# Patient Record
Sex: FEMALE | Race: WHITE | NOT HISPANIC OR LATINO | Employment: FULL TIME | URBAN - METROPOLITAN AREA
[De-identification: names, ages, dates, MRNs, and addresses within clinical notes are randomized per-mention and may not be internally consistent; named-entity substitution may affect disease eponyms.]

---

## 2017-01-26 ENCOUNTER — OFFICE VISIT (OUTPATIENT)
Dept: DERMATOLOGY | Facility: CLINIC | Age: 57
End: 2017-01-26

## 2017-01-26 VITALS — DIASTOLIC BLOOD PRESSURE: 89 MMHG | SYSTOLIC BLOOD PRESSURE: 137 MMHG | HEART RATE: 62 BPM

## 2017-01-26 DIAGNOSIS — L63.9 ALOPECIA AREATA: Primary | ICD-10-CM

## 2017-01-26 DIAGNOSIS — L63.9 ALOPECIA AREATA: ICD-10-CM

## 2017-01-26 DIAGNOSIS — L73.9 FOLLICULITIS: ICD-10-CM

## 2017-01-26 LAB
BASOPHILS # BLD AUTO: 0.1 10E9/L (ref 0–0.2)
BASOPHILS NFR BLD AUTO: 0.9 %
DIFFERENTIAL METHOD BLD: NORMAL
EOSINOPHIL # BLD AUTO: 0.3 10E9/L (ref 0–0.7)
EOSINOPHIL NFR BLD AUTO: 3.3 %
ERYTHROCYTE [DISTWIDTH] IN BLOOD BY AUTOMATED COUNT: 12.4 % (ref 10–15)
HBA1C MFR BLD: 5.5 % (ref 4.3–6)
HCT VFR BLD AUTO: 43.5 % (ref 35–47)
HGB BLD-MCNC: 14.6 G/DL (ref 11.7–15.7)
IMM GRANULOCYTES # BLD: 0 10E9/L (ref 0–0.4)
IMM GRANULOCYTES NFR BLD: 0.4 %
LYMPHOCYTES # BLD AUTO: 3.3 10E9/L (ref 0.8–5.3)
LYMPHOCYTES NFR BLD AUTO: 35.2 %
MCH RBC QN AUTO: 31 PG (ref 26.5–33)
MCHC RBC AUTO-ENTMCNC: 33.6 G/DL (ref 31.5–36.5)
MCV RBC AUTO: 92 FL (ref 78–100)
MONOCYTES # BLD AUTO: 0.4 10E9/L (ref 0–1.3)
MONOCYTES NFR BLD AUTO: 4.7 %
NEUTROPHILS # BLD AUTO: 5.2 10E9/L (ref 1.6–8.3)
NEUTROPHILS NFR BLD AUTO: 55.5 %
NRBC # BLD AUTO: 0 10*3/UL
NRBC BLD AUTO-RTO: 0 /100
PLATELET # BLD AUTO: 316 10E9/L (ref 150–450)
RBC # BLD AUTO: 4.71 10E12/L (ref 3.8–5.2)
WBC # BLD AUTO: 9.3 10E9/L (ref 4–11)

## 2017-01-26 RX ORDER — GENTAMICIN SULFATE 1 MG/G
CREAM TOPICAL PRN
COMMUNITY
Start: 2016-11-21 | End: 2023-01-05

## 2017-01-26 RX ORDER — ALBUTEROL SULFATE 90 UG/1
AEROSOL, METERED RESPIRATORY (INHALATION)
COMMUNITY
Start: 2006-01-01

## 2017-01-26 RX ORDER — SIMVASTATIN 80 MG
80 TABLET ORAL DAILY
COMMUNITY
Start: 2006-01-01

## 2017-01-26 RX ORDER — TRIAMTERENE CAPSULES 50 MG/1
CAPSULE ORAL DAILY
COMMUNITY
End: 2020-02-20

## 2017-01-26 RX ORDER — ALBUTEROL SULFATE 0.83 MG/ML
SOLUTION RESPIRATORY (INHALATION)
COMMUNITY
Start: 2010-02-01 | End: 2020-02-20

## 2017-01-26 RX ORDER — HYDROCODONE BITARTRATE AND ACETAMINOPHEN 5; 325 MG/1; MG/1
TABLET ORAL
COMMUNITY
Start: 2010-01-01

## 2017-01-26 RX ORDER — MONTELUKAST SODIUM 10 MG/1
10 TABLET ORAL DAILY
COMMUNITY
Start: 2010-01-01

## 2017-01-26 RX ORDER — KETOCONAZOLE 20 MG/ML
SHAMPOO TOPICAL
COMMUNITY
Start: 2016-11-21 | End: 2017-06-29

## 2017-01-26 ASSESSMENT — PAIN SCALES - GENERAL: PAINLEVEL: NO PAIN (0)

## 2017-01-26 NOTE — Clinical Note
1/26/2017       RE: Eliane Lockhart  [address]  [facility] [address]     Dear Colleague,    Thank you for referring your patient, Eliane Lockhart, to the [facility] DERMATOLOGY at [facility]. Please see a copy of my visit note below.    No notes on file    Again, thank you for allowing me to participate in the care of your patient.      Sincerely,    León Alejo MD

## 2017-01-26 NOTE — MR AVS SNAPSHOT
After Visit Summary   1/26/2017    Eliane Lockhart    MRN: [id number]           Patient Information     Date Of Birth          1960        Visit Information        Provider Department      1/26/2017 8:30 AM León Alejo MD [facility] Dermatology        Today's Diagnoses     Alopecia areata    -  1        Follow-ups after your visit        Your next 10 appointments already scheduled     Jan 26, 2017  9:45 AM   LAB with  LAB   [facility] ([facility])    [address] [place] [id number] [phone]           Patient must bring picture ID.  Patient should be prepared to give a urine specimen  Please do not eat 10-12 hours before your appointment if you are coming in fasting for labs on lipids, cholesterol, or glucose (sugar).  Pregnant women should follow their Care Team instructions. Water with medications is okay. Do not drink coffee or other fluids.   If you have concerns about taking  your medications, please ask at office or if scheduling via LOC&ALLhart, send a message by clicking on Secure Messaging, Message Your Care Team.            Mar 02, 2017  9:15 AM   (Arrive by 9:00 AM)   Return Visit with León Alejo MD   [facility] Dermatology ([facility])    [address] [place] [address] [phone]              Future tests that were ordered for you today     Open Future Orders        Priority Expected Expires Ordered    Skin Culture Aerobic Bacterial Routine  1/26/2018 1/26/2017    Antinuclear antibody screen by EIA Routine  1/26/2018 1/26/2017    Hemoglobin A1c Routine  1/27/2018 1/26/2017    CBC with platelets differential Routine  1/26/2018 1/26/2017    DHEA sulfate Routine  1/26/2018 1/26/2017    Testosterone Free and Total Routine  1/26/2018 1/26/2017    Thyroid peroxidase antibody Routine  1/26/2018 1/26/2017            Who to contact     Please call your clinic at [phone]  to:    Ask questions about your health    Make or cancel appointments    Discuss your medicines    Learn about your test results    Speak to your doctor   If you have compliments or concerns about an experience at your clinic, or if you wish to file a complaint, please contact [facility] Patient Relations at [phone] or email us at [email or web address]         Additional Information About Your Visit        Flashstartshart Information     Flashstartshart gives you secure access to your electronic health record. If you see a primary care provider, you can also send messages to your care team and make appointments. If you have questions, please call your primary care clinic.  If you do not have a primary care provider, please call [phone] and they will assist you.      Venuefox is an electronic gateway that provides easy, online access to your medical records. With Venuefox, you can request a clinic appointment, read your test results, renew a prescription or communicate with your care team.     To access your existing account, please contact your [facility] or call [phone] for assistance.        Care EveryWhere ID     This is your Care EveryWhere ID. This could be used by other organizations to access your [place] medical records  [id number]         Blood Pressure from Last 3 Encounters:   No data found for BP    Weight from Last 3 Encounters:   No data found for Wt               Primary Care Provider Office Phone # Fax #    Madai Elizondo [phone]       [facility] [address]  [facility] [address]        Thank you!     Thank you for choosing [facility]  for your care. Our goal is always to provide you with excellent care. Hearing back from our patients is one way we can continue to improve our services. Please take a few minutes to complete the written survey that you may receive in the mail  after your visit with us. Thank you!             Your Updated Medication List - Protect others around you: Learn how to safely use, store and throw away your medicines at www.disposemymeds.org.          This list is accurate as of: 1/26/17  9:32 AM.  Always use your most recent med list.                   Brand Name Dispense Instructions for use    ADVAIR DISKUS 250-50 MCG/DOSE diskus inhaler   Generic drug:  fluticasone-salmeterol          * VENTOLIN  (90 BASE) MCG/ACT Inhaler   Generic drug:  albuterol          * albuterol (2.5 MG/3ML) 0.083% neb solution          gentamicin 0.1 % cream    GARAMYCIN         HYDROcodone-acetaminophen 5-325 MG per tablet    NORCO         ketoconazole 2 % shampoo    NIZORAL         montelukast 10 MG tablet    SINGULAIR         simvastatin 80 MG tablet    ZOCOR         triamterene 50 MG capsule    DYRENIUM         * Notice:  This list has 2 medication(s) that are the same as other medications prescribed for you. Read the directions carefully, and ask your doctor or other care provider to review them with you.

## 2017-01-26 NOTE — NURSING NOTE
Dermatology Rooming Note    Eliane Lockhart's goals for this visit include:   Chief Complaint   Patient presents with     Derm Problem     Patient comes to clinic today referred by [place] [facility] for AA since October.     Aaliyah Archibald CMA

## 2017-01-26 NOTE — PROGRESS NOTES
"[facility] Dermatology Note      Dermatology Problem List:  1. Alopecia areata, rapidly-progressive, with scalp folliculitis    Encounter Date: Jan 26, 2017    CC:   Chief Complaint   Patient presents with     Derm Problem     Patient comes to clinic today referred by [place] [facility] for AA since October.         History of Present Illness:  Ms. Eliane Lockhart is a 57 year old female who presents for evaluation of severe hair loss since October, that began after she started treatment for an occipital rash of uncertain duration with topical triamcinolone 0.1% ointment. She reports rapidly-progressive hair loss in clumps, involving her entire scalp and eyebrows within days thereafter, as well as decreased hair growth on her arm and legs. She has been treated with oral cephalexin for a culture-proven staphylococcal scalp folliculitis, noting improvement of her previous large, tender \"pus bumps\" with smaller, less tender ones remaining today.     Past Medical History:   Significant for Rheumatoid arthritis(treated with methotrexate in the distant past, now with OTC NSAIDs), obesity, hyperlipidemia, and asthma requiring multiple inhalers and intermittent prednisone    Family History:  Daughter with atopic dermatitis, otherwise no chronic skin/autoimmune conditions.    Medications:  Current Outpatient Prescriptions   Medication Sig Dispense Refill     fluticasone-salmeterol (ADVAIR DISKUS) 250-50 MCG/DOSE diskus inhaler        albuterol (2.5 MG/3ML) 0.083% neb solution        HYDROcodone-acetaminophen (NORCO) 5-325 MG per tablet        montelukast (SINGULAIR) 10 MG tablet        simvastatin (ZOCOR) 80 MG tablet        albuterol (VENTOLIN HFA) 108 (90 BASE) MCG/ACT Inhaler        gentamicin (GARAMYCIN) 0.1 % cream        ketoconazole (NIZORAL) 2 % shampoo        triamterene (DYRENIUM) 50 MG capsule           Allergies   Allergen Reactions     Seasonal Allergies Other (See Comments)     Runny nose, " "eyes, difficulty breathing       Review of Systems:  General: No recent infections, fevers, chills, malaise, arthralgias, unexplained weight/appetite changes  Skin: No new/changing moles, nothing bleeding/nonhealing/painful/tender/rapidly-growing.  Lymphatic: No subcutaneous lumps/bumps.    Physical exam:  Vitals: /89, P: 62  GEN: Well-appearing female, comfortable, cooperative  SKIN: Exam of the scalp, face including lids and lips, arms, fingernails:  - diffuse severe thinning of scalp and eyebrow hair density with intact follicular ostia, multiple exclamation point hairs, and positive pull testing from the R frontal region.  - >20 follicular red papules with two intact 1-mm pustules on the occipital and parietal scalp  - nails WNL  -No other lesions of concern on areas examined.       Labs:    Outside biopsy: 11/15/16, L parietal scalp:  \"alopecia areata with acute folliculitis and gram-positive cocci\"  - brisk Peribulbar and perifollicular inflammation, prominent shift to telogen phase.    Outside labs   8/9/16 TSH 2.27, Vit D 25 33.2 (ref: 30.0 - 80.0) Na 138, K 4.1, Glucose 94, Cr 0.84, BUN 16,   10/18/16 - Cholesterol 219, , HDL 66, , Progesterone 0.1, ALT 24     Impression/Plan:  1. Alopecia areata, rapidly-progressive with secondary folliculitis.  - discussed suspected etiology, and likely multifactorial nature  - swab for culture  - cont ketocoanzole shampoo, oral Vit D supplementation  - RX: BPO 5% washes  - draw cbc, free/total testosterone, anti-TPO, A1C  - will consider  antibiotics and prednisone pending lab results    Follow-up: 5 weeks.    Discussed and examined with [facility] staff dermatologist Dr. Jayda Siddiqi.    León Alejo MD, CHRISTIAN  PGY-4, Dermatology    Staff Involved:  Resident(León Alejo)/Staff(as above)      "

## 2017-01-26 NOTE — Clinical Note
"1/26/2017      RE: Eliane Lockhart  [address]  [facility] [address]       [facility] Dermatology Note      Dermatology Problem List:  1. Alopecia areata, rapidly-progressive, with scalp folliculitis    Encounter Date: Jan 26, 2017    CC:   Chief Complaint   Patient presents with     Derm Problem     Patient comes to clinic today referred by [place] [facility] for AA since October.         History of Present Illness:  Ms. Eliane Lockhart is a 57 year old female who presents for evaluation of severe hair loss since October, that began after she started treatment for an occipital rash of uncertain duration with topical triamcinolone 0.1% ointment. She reports rapidly-progressive hair loss in clumps, involving her entire scalp and eyebrows within days thereafter, as well as decreased hair growth on her arm and legs. She has been treated with oral cephalexin for a culture-proven staphylococcal scalp folliculitis, noting improvement of her previous large, tender \"pus bumps\" with smaller, less tender ones remaining today.     Past Medical History:   Significant for Rheumatoid arthritis(treated with methotrexate in the distant past, now with OTC NSAIDs), obesity, hyperlipidemia, and asthma requiring multiple inhalers and intermittent prednisone    Family History:  Daughter with atopic dermatitis, otherwise no chronic skin/autoimmune conditions.    Medications:  Current Outpatient Prescriptions   Medication Sig Dispense Refill     fluticasone-salmeterol (ADVAIR DISKUS) 250-50 MCG/DOSE diskus inhaler        albuterol (2.5 MG/3ML) 0.083% neb solution        HYDROcodone-acetaminophen (NORCO) 5-325 MG per tablet        montelukast (SINGULAIR) 10 MG tablet        simvastatin (ZOCOR) 80 MG tablet        albuterol (VENTOLIN HFA) 108 (90 BASE) MCG/ACT Inhaler        gentamicin (GARAMYCIN) 0.1 % cream        ketoconazole (NIZORAL) 2 % shampoo        triamterene (DYRENIUM) 50 MG capsule    " "       Allergies   Allergen Reactions     Seasonal Allergies Other (See Comments)     Runny nose, eyes, difficulty breathing       Review of Systems:  General: No recent infections, fevers, chills, malaise, arthralgias, unexplained weight/appetite changes  Skin: No new/changing moles, nothing bleeding/nonhealing/painful/tender/rapidly-growing.  Lymphatic: No subcutaneous lumps/bumps.    Physical exam:  Vitals: /89, P: 62  GEN: Well-appearing female, comfortable, cooperative  SKIN: Exam of the scalp, face including lids and lips, arms, fingernails:  - diffuse severe thinning of scalp and eyebrow hair density with intact follicular ostia, multiple exclamation point hairs, and positive pull testing from the R frontal region.  - >20 follicular red papules with two intact 1-mm pustules on the occipital and parietal scalp  - nails WNL  -No other lesions of concern on areas examined.       Labs:    Outside biopsy: 11/15/16, L parietal scalp:  \"alopecia areata with acute folliculitis and gram-positive cocci\"  - brisk Peribulbar and perifollicular inflammation, prominent shift to telogen phase.    Outside labs   8/9/16 TSH 2.27, Vit D 25 33.2 (ref: 30.0 - 80.0) Na 138, K 4.1, Glucose 94, Cr 0.84, BUN 16,   10/18/16 - Cholesterol 219, , HDL 66, , Progesterone 0.1, ALT 24     Impression/Plan:  1. Alopecia areata, rapidly-progressive with secondary folliculitis.  - discussed suspected etiology, and likely multifactorial nature  - swab for culture  - cont ketocoanzole shampoo, oral Vit D supplementation  - RX: BPO 5% washes  - draw cbc, free/total testosterone, anti-TPO, A1C  - will consider  antibiotics and prednisone pending lab results    Follow-up: 5 weeks.    Discussed and examined with [facility] staff dermatologist Dr. Jayda Siddiqi.    León Alejo MD, CHRISTIAN  PGY-4, Dermatology    Staff Involved:  Resident(León Alejo)/Staff(as above)                      Pictures taken of patient today to be placed " in chart for future reference.        León Alejo MD

## 2017-01-27 LAB
ANA SER QL IA: 1.3
DHEA-S SERPL-MCNC: 58 UG/DL (ref 35–430)
THYROPEROXIDASE AB SERPL-ACNC: 18 IU/ML

## 2017-01-28 LAB
BACTERIA SPEC CULT: NORMAL
Lab: NORMAL
MICRO REPORT STATUS: NORMAL
SPECIMEN SOURCE: NORMAL

## 2017-01-29 LAB
SHBG SERPL-SCNC: 32 NMOL/L (ref 30–135)
TESTOST FREE SERPL-MCNC: 0.18 NG/DL (ref 0.06–0.38)
TESTOST SERPL-MCNC: 10 NG/DL (ref 8–60)

## 2017-02-13 ENCOUNTER — MYC MEDICAL ADVICE (OUTPATIENT)
Dept: DERMATOLOGY | Facility: CLINIC | Age: 57
End: 2017-02-13

## 2017-02-22 RX ORDER — PREDNISONE 5 MG/1
TABLET ORAL
Qty: 124 TABLET | Refills: 0 | Status: SHIPPED
Start: 2017-02-22 | End: 2023-01-05

## 2017-03-30 ENCOUNTER — OFFICE VISIT (OUTPATIENT)
Dept: DERMATOLOGY | Facility: CLINIC | Age: 57
End: 2017-03-30

## 2017-03-30 ENCOUNTER — TELEPHONE (OUTPATIENT)
Dept: DERMATOLOGY | Facility: CLINIC | Age: 57
End: 2017-03-30

## 2017-03-30 DIAGNOSIS — L63.9 AA (ALOPECIA AREATA): Primary | ICD-10-CM

## 2017-03-30 ASSESSMENT — PAIN SCALES - GENERAL: PAINLEVEL: NO PAIN (0)

## 2017-03-30 NOTE — MR AVS SNAPSHOT
After Visit Summary   3/30/2017    Eliane Lockhart    MRN: [id number]           Patient Information     Date Of Birth          1960        Visit Information        Provider Department      3/30/2017 8:00 AM León Alejo MD [facility] Dermatology        Today's Diagnoses     AA (alopecia areata)    -  1       Follow-ups after your visit        Your next 10 appointments already scheduled     Jun 29, 2017  8:00 AM CDT   (Arrive by 7:45 AM)   Return Visit with MD S[facility] Dermatology ([facility])    [address] [place] [address] [phone]              Who to contact     Please call your clinic at [phone] to:    Ask questions about your health    Make or cancel appointments    Discuss your medicines    Learn about your test results    Speak to your doctor   If you have compliments or concerns about an experience at your clinic, or if you wish to file a complaint, please contact [facility] Patient Relations at [phone] or email us at [email or web address]         Additional Information About Your Visit        MyChart Information     Playrollt gives you secure access to your electronic health record. If you see a primary care provider, you can also send messages to your care team and make appointments. If you have questions, please call your primary care clinic.  If you do not have a primary care provider, please call [phone] and they will assist you.      Playrollt is an electronic gateway that provides easy, online access to your medical records. With [employer], you can request a clinic appointment, read your test results, renew a prescription or communicate with your care team.     To access your existing account, please contact your [facility] or call [phone] for assistance.        Care EveryWhere ID     This is your Care EveryWhere ID. This  could be used by other organizations to access your [place] medical records  [id number]         Blood Pressure from Last 3 Encounters:   01/26/17 137/89    Weight from Last 3 Encounters:   No data found for Wt              Today, you had the following     No orders found for display         Today's Medication Changes          These changes are accurate as of: 3/30/17  8:36 AM.  If you have any questions, ask your nurse or doctor.               Start taking these medicines.        Dose/Directions    tofacitinib 5 MG tablet   Commonly known as:  XELJANZ   Used for:  AA (alopecia areata)   Started by:  León Alejo MD        Dose:  5 mg   Take 1 tablet (5 mg) by mouth 2 times daily   Quantity:  60 tablet   Refills:  2            Where to get your medicines      These medications were sent to [facility] - [place], [place] - [address]  [address] [facility] [address]    Hours:  Mon-Fri 8:30am-5:00pm Toll Free [phone] Phone:  [phone]     tofacitinib 5 MG tablet                Primary Care Provider Office Phone # Fax #    Madai Elizondo [phone]       [facility] [address]  [facility] [address]        Thank you!     Thank you for choosing [facility] DERMATOLOGY  for your care. Our goal is always to provide you with excellent care. Hearing back from our patients is one way we can continue to improve our services. Please take a few minutes to complete the written survey that you may receive in the mail after your visit with us. Thank you!             Your Updated Medication List - Protect others around you: Learn how to safely use, store and throw away your medicines at www.disposemymeds.org.          This list is accurate as of: 3/30/17  8:36 AM.  Always use your most recent med list.                   Brand Name Dispense Instructions for use    ADVAIR DISKUS 250-50 MCG/DOSE diskus inhaler   Generic drug:   fluticasone-salmeterol          * VENTOLIN  (90 BASE) MCG/ACT Inhaler   Generic drug:  albuterol          * albuterol (2.5 MG/3ML) 0.083% neb solution          gentamicin 0.1 % cream    GARAMYCIN         HYDROcodone-acetaminophen 5-325 MG per tablet    NORCO         ketoconazole 2 % shampoo    NIZORAL         montelukast 10 MG tablet    SINGULAIR         predniSONE 5 MG tablet    DELTASONE    124 tablet    Take 8 tablets for 5 days, 7 tablets for 3 days, 6 tablets for 3 days, 5 tablets for 3 days, 4 tablets for 3 days, 3 tablets for 3 days, 2 tablets for 3 days, then 1 tablet for 3 days.       simvastatin 80 MG tablet    ZOCOR         tofacitinib 5 MG tablet    XELJANZ    60 tablet    Take 1 tablet (5 mg) by mouth 2 times daily       triamterene 50 MG capsule    DYRENIUM         * Notice:  This list has 2 medication(s) that are the same as other medications prescribed for you. Read the directions carefully, and ask your doctor or other care provider to review them with you.

## 2017-03-30 NOTE — NURSING NOTE
Dermatology Rooming Note    Eliane Lockhart's goals for this visit include:   Chief Complaint   Patient presents with     Derm Problem     Patient comes to clinic today for hairloss. States improvement.     Aaliyah Archibald, CMA

## 2017-03-30 NOTE — LETTER
3/30/2017       RE: Eliane Lockhart  [address]  [facility] [address]     Dear Colleague,    Thank you for referring your patient, Eliane Lockhart, to the [facility] DERMATOLOG[facility] at [facility]. Please see a copy of my visit note below.                    Pictures taken of patient today to be placed in chart for future reference.      [facility] Dermatology Note      Dermatology Problem List:  1. Alopecia areata, rapidly-progressive, Onset Oct 2010 with scalp folliculitis  - ILK  2. Rheumatoid arthritis, no current tx (methotrexate previously)    Encounter Date: Mar 30, 2017    CC:   Chief Complaint   Patient presents with     Derm Problem     Patient comes to clinic today for hairloss. States improvement.         History of Present Illness:  Ms. Eliane Lockhart is a 57 year old female who returns for follow-up. She reports modest but appreciable regrowth on her scalp but not on her legs, arms, or eyebrows with prednisone taper Rx'd in January, no significant side effects, now off of the medication.       Past Medical History:   Significant for Rheumatoid arthritis(treated with methotrexate in the distant past, now with OTC NSAIDs), obesity, hyperlipidemia, and asthma requiring multiple inhalers and intermittent prednisone    Family History:  Daughter with atopic dermatitis, otherwise no chronic skin/autoimmune conditions.    Medications:  Current Outpatient Prescriptions   Medication Sig Dispense Refill     predniSONE (DELTASONE) 5 MG tablet Take 8 tablets for 5 days, 7 tablets for 3 days, 6 tablets for 3 days, 5 tablets for 3 days, 4 tablets for 3 days, 3 tablets for 3 days, 2 tablets for 3 days, then 1 tablet for 3 days. 124 tablet 0     fluticasone-salmeterol (ADVAIR DISKUS) 250-50 MCG/DOSE diskus inhaler        albuterol (2.5 MG/3ML) 0.083% neb solution        HYDROcodone-acetaminophen (NORCO) 5-325 MG per tablet        montelukast  "(SINGULAIR) 10 MG tablet        simvastatin (ZOCOR) 80 MG tablet        triamterene (DYRENIUM) 50 MG capsule        albuterol (VENTOLIN HFA) 108 (90 BASE) MCG/ACT Inhaler        gentamicin (GARAMYCIN) 0.1 % cream        ketoconazole (NIZORAL) 2 % shampoo           Allergies   Allergen Reactions     Seasonal Allergies Other (See Comments)     Runny nose, eyes, difficulty breathing       Review of Systems:  General: No recent infections, fevers, chills, malaise, arthralgias, unexplained weight/appetite changes  Skin: No new/changing moles, nothing bleeding/nonhealing/painful/tender/rapidly-growing.  Lymphatic: No subcutaneous lumps/bumps.    Physical exam:  Vitals: /89, P: 62  GEN: Well-appearing female, comfortable, cooperative  SKIN: Exam of the scalp, face including lids and lips, arms, fingernails:  - diffuse severe thinning of scalp and eyebrow hair density with intact follicular ostia, multiple exclamation point hairs, and positive pull testing from the R frontal region.  - no active folliculitis  - nails WNL  -No other lesions of concern on areas examined.       Labs:    Outside biopsy: 11/15/16, L parietal scalp:  \"alopecia areata with acute folliculitis and gram-positive cocci\"  - brisk Peribulbar and perifollicular inflammation, prominent shift to telogen phase.    Outside labs   8/9/16 TSH 2.27, Vit D 25 33.2 (ref: 30.0 - 80.0) Na 138, K 4.1, Glucose 94, Cr 0.84, BUN 16,   10/18/16 - Cholesterol 219, , HDL 66, , Progesterone 0.1, ALT 24     Impression/Plan:  1. Alopecia areata, rapidly-progressive with secondary folliculitis. Hx RA not currently being treated, has had mtx in the past. Discussed favored options, mtx vs tofacitinib (which is approved for RA). She prefers the latter  - discussed tofacitinib side effects at length.    Pending PAP approval, will start tofacinitib ***5mg/10mg ***once daily/twice daily. For tofacitinib monitoring, will obtain baseline CBC with differential, CMP, " fasting lipid profile, quantiferon TB gold, HIV/Hep B core IgM/Hep B core IgG/Hep B sAG/Hep C Ab,  and HR/BP.     We will not initiate therapy if abs lymphocyte count <500 cells/mm3, absolute neutrophil count <1000 cells/mm3, hemoglobin <9 g/dL, baseline heart rate <60 bpm.For lab monitoring, will repeat CBC with diff, CMP, fasting lipid profile in 4 weeks, 8 weeks, and if stable then every 3 months thereafter.     Skin exam for skin cancer will also be periodically performed.  Will need to review with patient at each visit symptoms of nasopharyngitis, myalgias, cardiovascular effects,  abdominal symptoms including diarrhea, nausea, headache, parasthesias, recent hospitalizations/illnesses, new or changing moles.     HR/BP will be obtained at each clinic visit.     This is the web site to complete the patient assistance application    [email or web address]  Follow-up: 5 weeks.    Discussed and examined with [facility] staff dermatologist Dr. Jayda Siddiqi.    León Alejo MD, CHRISTIAN  PGY-4, Dermatology    Staff Involved:  Resident(León Alejo)/Staff(as above)        Discussed with patient tofacitinib as an option. Discussed benefits and risks of medication including but not limited to risk of serious infection, lymphoma, other malignancies, abnormalities in CBC, liver dysfunction, URI and renal disease. The patient was counseled to hold dose if he/she feels ill and to contact clinic. Vaccinations reviewed. The patient denies conduction abnormalities, syncope or arrhythmia, ischemic heart disease, heart failure, and is not receiving concomitant therapy known to decrease heart rate or prolong the MO interval. There is no history of GI perforation, diverticulitis or interstitial lung disease. Patient is not currently pregnant or breastfeeding.

## 2017-03-30 NOTE — PROGRESS NOTES
[facility] Dermatology Note      Dermatology Problem List:  1. Alopecia areata, rapidly-progressive, Onset Oct 2010 with scalp folliculitis  - ILK  2. Rheumatoid arthritis, no current tx (methotrexate previously)    Encounter Date: Mar 30, 2017    CC:   Chief Complaint   Patient presents with     Derm Problem     Patient comes to clinic today for hairloss. States improvement.         History of Present Illness:  Ms. Eliane Lockhart is a 57 year old female who returns for follow-up. She reports modest but appreciable regrowth on her scalp but not on her legs, arms, or eyebrows with prednisone taper Rx'd in January, no significant side effects, now off of the medication.       Past Medical History:   Significant for Rheumatoid arthritis(treated with methotrexate in the distant past, now with OTC NSAIDs), obesity, hyperlipidemia, and asthma requiring multiple inhalers and intermittent prednisone    Family History:  Daughter with atopic dermatitis, otherwise no chronic skin/autoimmune conditions.    Medications:  Current Outpatient Prescriptions   Medication Sig Dispense Refill     predniSONE (DELTASONE) 5 MG tablet Take 8 tablets for 5 days, 7 tablets for 3 days, 6 tablets for 3 days, 5 tablets for 3 days, 4 tablets for 3 days, 3 tablets for 3 days, 2 tablets for 3 days, then 1 tablet for 3 days. 124 tablet 0     fluticasone-salmeterol (ADVAIR DISKUS) 250-50 MCG/DOSE diskus inhaler        albuterol (2.5 MG/3ML) 0.083% neb solution        HYDROcodone-acetaminophen (NORCO) 5-325 MG per tablet        montelukast (SINGULAIR) 10 MG tablet        simvastatin (ZOCOR) 80 MG tablet        triamterene (DYRENIUM) 50 MG capsule        albuterol (VENTOLIN HFA) 108 (90 BASE) MCG/ACT Inhaler        gentamicin (GARAMYCIN) 0.1 % cream        ketoconazole (NIZORAL) 2 % shampoo           Allergies   Allergen Reactions     Seasonal Allergies Other (See Comments)     Runny nose, eyes, difficulty breathing       Review  "of Systems:  General: No recent infections, fevers, chills, malaise, arthralgias, unexplained weight/appetite changes  Skin: No new/changing moles, nothing bleeding/nonhealing/painful/tender/rapidly-growing.  Lymphatic: No subcutaneous lumps/bumps.    Physical exam:  Vitals: /89, P: 62  GEN: Well-appearing female, comfortable, cooperative  SKIN: Exam of the scalp, face including lids and lips, arms, fingernails:  - diffuse severe thinning of scalp and eyebrow hair density with intact follicular ostia, multiple exclamation point hairs, and positive pull testing from the R frontal region.  - no active folliculitis  - nails WNL  -No other lesions of concern on areas examined.       Labs:    Outside biopsy: 11/15/16, L parietal scalp:  \"alopecia areata with acute folliculitis and gram-positive cocci\"  - brisk Peribulbar and perifollicular inflammation, prominent shift to telogen phase.    Outside labs   8/9/16 TSH 2.27, Vit D 25 33.2 (ref: 30.0 - 80.0) Na 138, K 4.1, Glucose 94, Cr 0.84, BUN 16,   10/18/16 - Cholesterol 219, , HDL 66, , Progesterone 0.1, ALT 24     Impression/Plan:  1. Alopecia areata, rapidly-progressive with secondary folliculitis. Hx JRA/RA not currently being treated, has had methotrexate in the past ,no longer follows with rheumatology. Discussed favored options, mtx vs tofacitinib (which is approved for RA). She prefers the latter.  - discussed tofacitinib side effects at length.  - Pending patient assistance program approval, will start tofacinitib 5 mg twice daily and obtain baseline CBC with differential, CMP, fasting lipid profile, quantiferon TB gold, HIV/Hep B core IgM/Hep B core IgG/Hep B sAG/Hep C Ab,  and HR/BP.     Follow-up: 5 weeks.    Discussed and examined with [facility] staff dermatologist Dr. Jayda Siddiqi.    León Alejo MD, CHRISTIAN  PGY-4, Dermatology    Staff Involved:  Resident(León Alejo)/Staff(as above)      "

## 2017-03-30 NOTE — TELEPHONE ENCOUNTER
PA Initiation    Medication: tofacitinib (Xeljanz) - Xelsource  Insurance Company:  [facility]  Pharmacy Filling the Rx:  [facility] - IF approved  Filling Pharmacy Phone:    Filling Pharmacy Fax:    Start Date:  3/30/17    ** Alopecia pt met in clinic during appt, discussed process to gain cov'g for Xeljanz (ins 1st and then if denied go thru free drug [employer]elsource), pt expressed understanding and took free drug forms home, advised to call with Qs, pt prefers MyChart communication, pt also has RA so may gain ins cov'g, pt verbally auth copay card if approved (HTP commercial ins)

## 2017-04-04 NOTE — TELEPHONE ENCOUNTER
[facility] at [phone] top 2 to check status of Xeljanz PA. Standard PA request so HTP has up to 14 days to complete, PA is currently awaiting pharmacist review and due date is 4/13/17.

## 2017-04-10 NOTE — TELEPHONE ENCOUNTER
PRIOR AUTHORIZATION DENIED    Medication: tofacitinib (Xeljanz) - [employer]ource    Denial Date:  4/10/17    Denial Rational: Not an FDA approved diagnosis or written by Rheum provider    Appeal Information: Per letter - off label so will just pursue free drug instead ( as planned)

## 2017-04-10 NOTE — TELEPHONE ENCOUNTER
Calling [facility] at [phone] opt 2 to check status of Xeljanz PA. Rep states pt did not meet criteria for approval and denial fax will be sent out sometime today.

## 2017-04-14 NOTE — TELEPHONE ENCOUNTER
Paperwork rec'd from [employer] regarding benefits investigation (BI) for pt. They state no Xeljanz PA or denial on file so liaison faxed back PA denial paperwork dated 4/10. Will f/u next week.

## 2017-04-18 NOTE — TELEPHONE ENCOUNTER
Calling [employer] program at [phone], opt 1 to check status of pt's application. PA denial paperwork rec'd by program, rep is sending  Alexa a msg to see if anything further is needed to move forward with pt's application. Call scheduled to speak to pt about BI results. Need to speak to pt before they can move forward.

## 2017-06-29 ENCOUNTER — OFFICE VISIT (OUTPATIENT)
Dept: DERMATOLOGY | Facility: CLINIC | Age: 57
End: 2017-06-29

## 2017-06-29 VITALS — HEART RATE: 65 BPM | SYSTOLIC BLOOD PRESSURE: 117 MMHG | DIASTOLIC BLOOD PRESSURE: 82 MMHG

## 2017-06-29 DIAGNOSIS — L63.9 AA (ALOPECIA AREATA): ICD-10-CM

## 2017-06-29 DIAGNOSIS — L63.9 ALOPECIA AREATA: ICD-10-CM

## 2017-06-29 DIAGNOSIS — Z79.899 ENCOUNTER FOR LONG-TERM (CURRENT) USE OF HIGH-RISK MEDICATION: ICD-10-CM

## 2017-06-29 DIAGNOSIS — M19.90 INFLAMMATORY ARTHRITIS: ICD-10-CM

## 2017-06-29 DIAGNOSIS — L63.9 AA (ALOPECIA AREATA): Primary | ICD-10-CM

## 2017-06-29 LAB
BASOPHILS # BLD AUTO: 0.1 10E9/L (ref 0–0.2)
BASOPHILS NFR BLD AUTO: 0.7 %
CHOLEST SERPL-MCNC: 180 MG/DL
DIFFERENTIAL METHOD BLD: NORMAL
EOSINOPHIL # BLD AUTO: 0.3 10E9/L (ref 0–0.7)
EOSINOPHIL NFR BLD AUTO: 3 %
ERYTHROCYTE [DISTWIDTH] IN BLOOD BY AUTOMATED COUNT: 12.4 % (ref 10–15)
HBV SURFACE AB SERPL IA-ACNC: 0.05 M[IU]/ML
HBV SURFACE AG SERPL QL IA: NONREACTIVE
HCT VFR BLD AUTO: 40.3 % (ref 35–47)
HCV AB SERPL QL IA: NORMAL
HDLC SERPL-MCNC: 74 MG/DL
HGB BLD-MCNC: 13.4 G/DL (ref 11.7–15.7)
HIV 1+2 AB+HIV1 P24 AG SERPL QL IA: NORMAL
IMM GRANULOCYTES # BLD: 0 10E9/L (ref 0–0.4)
IMM GRANULOCYTES NFR BLD: 0.4 %
LDLC SERPL CALC-MCNC: 86 MG/DL
LYMPHOCYTES # BLD AUTO: 4.3 10E9/L (ref 0.8–5.3)
LYMPHOCYTES NFR BLD AUTO: 46.8 %
MCH RBC QN AUTO: 30.5 PG (ref 26.5–33)
MCHC RBC AUTO-ENTMCNC: 33.3 G/DL (ref 31.5–36.5)
MCV RBC AUTO: 92 FL (ref 78–100)
MONOCYTES # BLD AUTO: 0.5 10E9/L (ref 0–1.3)
MONOCYTES NFR BLD AUTO: 5.4 %
NEUTROPHILS # BLD AUTO: 4 10E9/L (ref 1.6–8.3)
NEUTROPHILS NFR BLD AUTO: 43.7 %
NONHDLC SERPL-MCNC: 106 MG/DL
NRBC # BLD AUTO: 0 10*3/UL
NRBC BLD AUTO-RTO: 0 /100
PLATELET # BLD AUTO: 311 10E9/L (ref 150–450)
RBC # BLD AUTO: 4.39 10E12/L (ref 3.8–5.2)
TRIGL SERPL-MCNC: 102 MG/DL
WBC # BLD AUTO: 9.1 10E9/L (ref 4–11)

## 2017-06-29 RX ORDER — KETOCONAZOLE 20 MG/ML
SHAMPOO TOPICAL
Qty: 120 ML | Refills: 11 | Status: SHIPPED | OUTPATIENT
Start: 2017-06-29 | End: 2017-12-21

## 2017-06-29 ASSESSMENT — PAIN SCALES - GENERAL: PAINLEVEL: NO PAIN (0)

## 2017-06-29 NOTE — MR AVS SNAPSHOT
After Visit Summary   6/29/2017    Eliane Lockhart    MRN: [id number]           Patient Information     Date Of Birth          1960        Visit Information        Provider Department      6/29/2017 8:00 AM León Alejo MD [facility] Dermatology        Today's Diagnoses     AA (alopecia areata)    -  1    Encounter for long-term (current) use of high-risk medication        Alopecia areata           Follow-ups after your visit        Your next 10 appointments already scheduled     Jun 29, 2017  9:00 AM CDT   LAB with  LAB   [facility])    [address] [place] [id number] [phone]           Patient must bring picture ID.  Patient should be prepared to give a urine specimen  Please do not eat 10-12 hours before your appointment if you are coming in fasting for labs on lipids, cholesterol, or glucose (sugar).  Pregnant women should follow their Care Team instructions. Water with medications is okay. Do not drink coffee or other fluids.   If you have concerns about taking  your medications, please ask at office or if scheduling via [employer], send a message by clicking on Secure Messaging, Message Your Care Team.            Sep 28, 2017  8:15 AM CDT   (Arrive by 8:00 AM)   Return Visit with Edmund Leonard MD   [facility]at[facility] ([facility])    [address] [place] [id number] [phone]              Future tests that were ordered for you today     Open Future Orders        Priority Expected Expires Ordered    Hepatitis B Surface Antibody Routine  6/29/2018 6/29/2017    Hepatitis B surface antigen Routine  6/29/2018 6/29/2017    Hepatitis C antibody Routine  6/29/2018 6/29/2017    CBC with platelets differential Routine  6/29/2018 6/29/2017    HIV Antigen Antibody Combo Routine  6/29/2018 6/29/2017            Who to contact     Please call your clinic at  [phone] to:    Ask questions about your health    Make or cancel appointments    Discuss your medicines    Learn about your test results    Speak to your doctor   If you have compliments or concerns about an experience at your clinic, or if you wish to file a complaint, please contact [facility] Patient Relations at [phone] or email us at [email or web address]         Additional Information About Your Visit        [employer] Information     [employer] gives you secure access to your electronic health record. If you see a primary care provider, you can also send messages to your care team and make appointments. If you have questions, please call your primary care clinic.  If you do not have a primary care provider, please call [phone] and they will assist you.      [employer] is an electronic gateway that provides easy, online access to your medical records. With [employer], you can request a clinic appointment, read your test results, renew a prescription or communicate with your care team.     To access your existing account, please contact your [facility] or call [phone] for assistance.        Care EveryWhere ID     This is your Care EveryWhere ID. This could be used by other organizations to access your [place] medical records  [id number]        Your Vitals Were     Pulse                   65            Blood Pressure from Last 3 Encounters:   06/29/17 117/82   01/26/17 137/89    Weight from Last 3 Encounters:   No data found for Wt              We Performed the Following     Lipid Profile          Today's Medication Changes          These changes are accurate as of: 6/29/17  8:52 AM.  If you have any questions, ask your nurse or doctor.               These medicines have changed or have updated prescriptions.        Dose/Directions    ketoconazole 2 % shampoo   Commonly known as:  NIZORAL   This may have changed:  additional  instructions   Used for:  Alopecia areata   Changed by:  León Alejo MD        Three times weekly: lather into scalp, leave in place for 3-5 minutes, then rinse.   Quantity:  120 mL   Refills:  11            Where to get your medicines      These medications were sent to [facility] - [place], [place] - [address]  [address] [address]     Phone:  [phone]     ketoconazole 2 % shampoo                Primary Care Provider Office Phone # Fax #    Madai Elizondo [phone]       [facility] [address]  [place] [address]        Equal Access to Services     EDU ESPINO : Hadii ganesh ruelas hadasho Soomaali, waaxda luqadaha, qaybta kaalmada adeegyada, austyn ness. So [facility] [phone].    ATENCIÓN: Si habla español, tiene a griffith disposición servicios gratuitos de asistencia lingüística. Llame al [phone].    We comply with applicable federal civil rights laws and [place] laws. We do not discriminate on the basis of race, color, national origin, age, disability sex, sexual orientation or gender identity.            Thank you!     Thank you for choosing [facility] DERMATOLOGY  for your care. Our goal is always to provide you with excellent care. Hearing back from our patients is one way we can continue to improve our services. Please take a few minutes to complete the written survey that you may receive in the mail after your visit with us. Thank you!             Your Updated Medication List - Protect others around you: Learn how to safely use, store and throw away your medicines at www.disposemymeds.org.          This list is accurate as of: 6/29/17  8:52 AM.  Always use your most recent med list.                   Brand Name Dispense Instructions for use Diagnosis    ADVAIR DISKUS 250-50 MCG/DOSE diskus inhaler   Generic drug:  fluticasone-salmeterol       Alopecia  areata       * VENTOLIN  (90 BASE) MCG/ACT Inhaler   Generic drug:  albuterol       Alopecia areata       * albuterol (2.5 MG/3ML) 0.083% neb solution       Alopecia areata       gentamicin 0.1 % cream    GARAMYCIN      Alopecia areata       HYDROcodone-acetaminophen 5-325 MG per tablet    NORCO      Alopecia areata       ketoconazole 2 % shampoo    NIZORAL    120 mL    Three times weekly: lather into scalp, leave in place for 3-5 minutes, then rinse.    Alopecia areata       montelukast 10 MG tablet    SINGULAIR      Alopecia areata       predniSONE 5 MG tablet    DELTASONE    124 tablet    Take 8 tablets for 5 days, 7 tablets for 3 days, 6 tablets for 3 days, 5 tablets for 3 days, 4 tablets for 3 days, 3 tablets for 3 days, 2 tablets for 3 days, then 1 tablet for 3 days.    Alopecia areata       simvastatin 80 MG tablet    ZOCOR      Alopecia areata       tofacitinib 5 MG tablet    XELJANZ    60 tablet    Take 1 tablet (5 mg) by mouth 2 times daily    AA (alopecia areata)       triamterene 50 MG capsule    DYRENIUM      Alopecia areata       * Notice:  This list has 2 medication(s) that are the same as other medications prescribed for you. Read the directions carefully, and ask your doctor or other care provider to review them with you.

## 2017-06-29 NOTE — NURSING NOTE
"Dermatology Rooming Note    Eliane Lockhart's goals for this visit include:   Chief Complaint   Patient presents with     Derm Problem     Eliane comes to clinic today for alopecia aerata. States \"I have hair.\"     Aaliyah Archibald, [facility]    "

## 2017-06-29 NOTE — PROGRESS NOTES
"[facility] Dermatology Note      Dermatology Problem List:  1. Alopecia areata, rapidly-progressive, Onset Oct 2010 with scalp folliculitis  - oral tofacitinib  2. ROSARIO/JRA     Encounter Date: Jun 29, 2017    CC:   Chief Complaint   Patient presents with     Derm Problem     Eliane comes to clinic today for alopecia aerata. States \"I have hair.\"         History of Present Illness:  Ms. Eliane Lockhart is a 57 year old female who returns for follow-up, last seen 3/30 at which time tofacitinib coverage was ordered but screening lab workup was deferred.    She returns reporting having received and started the medication over 6 weeks ago. Already, she has noticed regrowth throughout her scalp and interval complete resolution of her joint pain/swelling (primarily right hand/wrist). She has not seen her rheumatologist within the last year, and reports negative lab evaluation for rheumatoid arthritis. Other than a mild URI, she has not experienced any infectious symptoms.       Past Medical History:   Significant for JRA/ROSARIO (treated with methotrexate in the distant past, now with OTC NSAIDs), obesity, hyperlipidemia, and asthma requiring multiple inhalers and intermittent prednisone    Family History:  Daughter with atopic dermatitis, otherwise no chronic skin/autoimmune conditions.    Medications:  Current Outpatient Prescriptions   Medication Sig Dispense Refill     ketoconazole (NIZORAL) 2 % shampoo Three times weekly: lather into scalp, leave in place for 3-5 minutes, then rinse. 120 mL 11     tofacitinib (XELJANZ) 5 MG tablet Take 1 tablet (5 mg) by mouth 2 times daily 60 tablet 2     predniSONE (DELTASONE) 5 MG tablet Take 8 tablets for 5 days, 7 tablets for 3 days, 6 tablets for 3 days, 5 tablets for 3 days, 4 tablets for 3 days, 3 tablets for 3 days, 2 tablets for 3 days, then 1 tablet for 3 days. 124 tablet 0     fluticasone-salmeterol (ADVAIR DISKUS) 250-50 MCG/DOSE diskus inhaler        " "albuterol (2.5 MG/3ML) 0.083% neb solution        HYDROcodone-acetaminophen (NORCO) 5-325 MG per tablet        montelukast (SINGULAIR) 10 MG tablet        simvastatin (ZOCOR) 80 MG tablet        triamterene (DYRENIUM) 50 MG capsule        albuterol (VENTOLIN HFA) 108 (90 BASE) MCG/ACT Inhaler        gentamicin (GARAMYCIN) 0.1 % cream           Allergies   Allergen Reactions     Seasonal Allergies Other (See Comments)     Runny nose, eyes, difficulty breathing       Review of Systems:  Denies nasopharyngitis, myalgias, cardiovascular effects,  abdominal symptoms including diarrhea, nausea, headache, parasthesias, recent hospitalizations/illnesses, new or changing moles.    Physical exam:  Vitals: /89, P: 62  GEN: Well-appearing female, comfortable, cooperative  SKIN: Exam of the scalp, face including lids and lips, arms, fingernails:  - interval increase in nonpigmented terminal hair density throughout the scalp with increased frontotemporal and occipital hairline definition. Uniform regrowth of both eyebrows. No exclamation point hairs, negative pull testing.  - 6-7 red 2 mm follicular papules on the occipital/parietal scalp  - nails WNL  -No other lesions of concern on areas examined.       Labs:    Outside biopsy: 11/15/16, L parietal scalp:  \"alopecia areata with acute folliculitis and gram-positive cocci\"  - brisk Peribulbar and perifollicular inflammation, prominent shift to telogen phase.    Outside labs   8/9/16 TSH 2.27, Vit D 25 33.2 (ref: 30.0 - 80.0) Na 138, K 4.1, Glucose 94, Cr 0.84, BUN 16,   10/18/16 - Cholesterol 219, , HDL 66, , Progesterone 0.1, ALT 24     Impression/Plan:  1. Alopecia areata, rapidly-progressive with secondary folliculitis. Hx RA not currently being treated, has had mtx in the past. Tofacitinib coverage was obtained, and she reports mistakenly having starting the medication (with impressive early results and resolution of her joint pain) before obtaining " screening labs, which we will obtain today.  Reviewed benefits and risks of medication including but not limited to risk of serious infection, lymphoma, other malignancies, abnormalities in CBC, liver dysfunction, URI and renal disease. The patient was counseled to hold dose if he/she feels ill and to contact clinic. Vaccinations reviewed. The patient denies conduction abnormalities, syncope or arrhythmia, ischemic heart disease, heart failure, and is not receiving concomitant therapy known to decrease heart rate or prolong the [place] interval. There is no history of GI perforation, diverticulitis or interstitial lung disease. Patient is not currently pregnant or breastfeeding.   - draw baseline CBC with differential, CMP, fasting lipid profile, quantiferon TB gold, HIV/Hep B core IgM/Hep B core IgG/Hep B sAG/Hep C Ab,  and HR/BP.   If within normal limits, continue tofacitinib 5 mg bid.  - We will not continue therapy if abs lymphocyte count <500 cells/mm3, absolute neutrophil count <1000 cells/mm3, hemoglobin <9 g/dL, baseline heart rate <60 bpm.For lab monitoring, will repeat CBC with diff, CMP, fasting lipid profile in 4 weeks, 8 weeks, and if stable then every 3 months thereafter.   -Skin exam for skin cancer will also be periodically performed, HR/BP will be obtained at each clinic visit.   - will place referral to rheumatology (here at [facility] at patients request, rather than her previous rheumatologist) to comment on the appropriateness of long-term KATE inhibitor therapy for her arthritis.    Follow-up: 3 months    Discussed and examined with [facility] staff dermatologist Dr. Jayda Siddiqi.    León Alejo MD, CHRISTIAN  PGY-4, Dermatology    Staff Involved:  Resident(León Alejo)/Staff(as above)        Patient was seen and examined with the dermatology resident. I agree with the history, review of systems, physical examination, assessments and plan.    Jayda Siddiqi MD  Professor and  Chair  Department of  Dermatology  [facility]

## 2017-06-29 NOTE — LETTER
"6/29/2017       RE: Eliane Lockhart  [address]  [place] [address]     Dear Colleague,    Thank you for referring your patient, Eliane Lockhart, to the [facility] DERMATOLOGY at [facility]. Please see a copy of my visit note below.    [facility] Dermatology Note      Dermatology Problem List:  1. Alopecia areata, rapidly-progressive, Onset Oct 2010 with scalp folliculitis  - oral tofacitinib  2. ROSARIO/JRA     Encounter Date: Jun 29, 2017    CC:   Chief Complaint   Patient presents with     Derm Problem     Eliane comes to clinic today for alopecia aerata. States \"I have hair.\"         History of Present Illness:  Ms. Eliane Lockhart is a 57 year old female who returns for follow-up, last seen 3/30 at which time tofacitinib coverage was ordered but screening lab workup was deferred.    She returns reporting having received and started the medication over 6 weeks ago. Already, she has noticed regrowth throughout her scalp and interval complete resolution of her joint pain/swelling (primarily right hand/wrist). She has not seen her rheumatologist within the last year, and reports negative lab evaluation for rheumatoid arthritis. Other than a mild URI, she has not experienced any infectious symptoms.       Past Medical History:   Significant for JRA/ROSARIO (treated with methotrexate in the distant past, now with OTC NSAIDs), obesity, hyperlipidemia, and asthma requiring multiple inhalers and intermittent prednisone    Family History:  Daughter with atopic dermatitis, otherwise no chronic skin/autoimmune conditions.    Medications:  Current Outpatient Prescriptions   Medication Sig Dispense Refill     ketoconazole (NIZORAL) 2 % shampoo Three times weekly: lather into scalp, leave in place for 3-5 minutes, then rinse. 120 mL 11     tofacitinib (XELJANZ) 5 MG tablet Take 1 tablet (5 mg) by mouth 2 times daily 60 tablet 2     predniSONE (DELTASONE) 5 " "MG tablet Take 8 tablets for 5 days, 7 tablets for 3 days, 6 tablets for 3 days, 5 tablets for 3 days, 4 tablets for 3 days, 3 tablets for 3 days, 2 tablets for 3 days, then 1 tablet for 3 days. 124 tablet 0     fluticasone-salmeterol (ADVAIR DISKUS) 250-50 MCG/DOSE diskus inhaler        albuterol (2.5 MG/3ML) 0.083% neb solution        HYDROcodone-acetaminophen (NORCO) 5-325 MG per tablet        montelukast (SINGULAIR) 10 MG tablet        simvastatin (ZOCOR) 80 MG tablet        triamterene (DYRENIUM) 50 MG capsule        albuterol (VENTOLIN HFA) 108 (90 BASE) MCG/ACT Inhaler        gentamicin (GARAMYCIN) 0.1 % cream           Allergies   Allergen Reactions     Seasonal Allergies Other (See Comments)     Runny nose, eyes, difficulty breathing       Review of Systems:  Denies nasopharyngitis, myalgias, cardiovascular effects,  abdominal symptoms including diarrhea, nausea, headache, parasthesias, recent hospitalizations/illnesses, new or changing moles.    Physical exam:  Vitals: /89, P: 62  GEN: Well-appearing female, comfortable, cooperative  SKIN: Exam of the scalp, face including lids and lips, arms, fingernails:  - interval increase in nonpigmented terminal hair density throughout the scalp with increased frontotemporal and occipital hairline definition. Uniform regrowth of both eyebrows. No exclamation point hairs, negative pull testing.  - 6-7 red 2 mm follicular papules on the occipital/parietal scalp  - nails WNL  -No other lesions of concern on areas examined.       Labs:    Outside biopsy: 11/15/16, L parietal scalp:  \"alopecia areata with acute folliculitis and gram-positive cocci\"  - brisk Peribulbar and perifollicular inflammation, prominent shift to telogen phase.    Outside labs   8/9/16 TSH 2.27, Vit D 25 33.2 (ref: 30.0 - 80.0) Na 138, K 4.1, Glucose 94, Cr 0.84, BUN 16,   10/18/16 - Cholesterol 219, , HDL 66, , Progesterone 0.1, ALT 24     Impression/Plan:  1. Alopecia areata, " rapidly-progressive with secondary folliculitis. Hx RA not currently being treated, has had mtx in the past. Tofacitinib coverage was obtained, and she reports mistakenly having starting the medication (with impressive early results and resolutino of her joint pain) before obtaining screening labs, which we will obtain today.  Reviewed benefits and risks of medication including but not limited to risk of serious infection, lymphoma, other malignancies, abnormalities in CBC, liver dysfunction, URI and renal disease. The patient was counseled to hold dose if he/she feels ill and to contact clinic. Vaccinations reviewed. The patient denies conduction abnormalities, syncope or arrhythmia, ischemic heart disease, heart failure, and is not receiving concomitant therapy known to decrease heart rate or prolong the [place] interval. There is no history of GI perforation, diverticulitis or interstitial lung disease. Patient is not currently pregnant or breastfeeding.     - draw baseline CBC with differential, CMP, fasting lipid profile, quantiferon TB gold, HIV/Hep B core IgM/Hep B core IgG/Hep B sAG/Hep C Ab,  and HR/BP.   If within normal limits, continue tofacitinib 5 mg bid.  - We will not initiate therapy if abs lymphocyte count <500 cells/mm3, absolute neutrophil count <1000 cells/mm3, hemoglobin <9 g/dL, baseline heart rate <60 bpm.For lab monitoring, will repeat CBC with diff, CMP, fasting lipid profile in 4 weeks, 8 weeks, and if stable then every 3 months thereafter.     -Skin exam for skin cancer will also be periodically performed, HR/BP will be obtained at each clinic visit.   - will place referral to [facility] rheumatology (to weigh in on the appropriateness of long-term KATE inhibitor therapy for her arthritis).    Follow-up: 3 months    Discussed and examined with [facility] staff dermatologist Dr. Jayda Siddiqi.    León Alejo MD, CHRISTIAN  PGY-4, Dermatology    Staff Involved:  Resident(León Alejo)/Staff(as  above)        Discussed with patient tofacitinib as an option. Discussed benefits and risks of medication including but not limited to risk of serious infection, lymphoma, other malignancies, abnormalities in CBC, liver dysfunction, URI and renal disease. The patient was counseled to hold dose if he/she feels ill and to contact clinic. Vaccinations reviewed. The patient denies conduction abnormalities, syncope or arrhythmia, ischemic heart disease, heart failure, and is not receiving concomitant therapy known to decrease heart rate or prolong the [place] interval. There is no history of GI perforation, diverticulitis or interstitial lung disease. Patient is not currently pregnant or breastfeeding.       Again, thank you for allowing me to participate in the care of your patient.      Sincerely,    León Alejo MD

## 2017-06-30 ENCOUNTER — TELEPHONE (OUTPATIENT)
Dept: DERMATOLOGY | Facility: CLINIC | Age: 57
End: 2017-06-30

## 2017-06-30 PROBLEM — M19.90 INFLAMMATORY ARTHRITIS: Status: ACTIVE | Noted: 2017-06-30

## 2017-07-14 ENCOUNTER — TELEPHONE (OUTPATIENT)
Dept: RHEUMATOLOGY | Facility: CLINIC | Age: 57
End: 2017-07-14

## 2017-07-14 NOTE — TELEPHONE ENCOUNTER
Rheumatology New Patient Referral Documentation:    Date Received: 7/6/2017     Reason for referral: inflammatory arthritis    Referring provider: Jayda Nobles at [facility] Derm    Patient contacted: 7/14/2017-LM x1 for patient to call. Need to explain referral process and set date/time to complete intake form. Gwendolyn Daily [facility]  7/14/2017 12:03 PM    LM x2. Need to explain the referral process and set up a date/time to complete the intake form. Gwendolyn Daily [facility]  7/25/2017 12:06 PM

## 2017-07-14 NOTE — LETTER
August 30, 2017    Eliane Lockhart  [address] [place] [address]    Dear Eliane,     You are receiving this letter because you were referred to our clinic by Jayda Siddiqi for inflammatory arthritis. We have tried to contact you to complete the intake process but were unsuccessful in our attempts. This letter is to inform you that should you decide that you would like to complete the intake process, please call [phone], Option 2, then Option 3, then ask for Rheumatology staff to and someone will assist you with this. Please understand that we will only keep your records until 9/29/17. If you contact us to make an appointment after 9/29/17, you will need to request those records again.    Thank you,     Adult Rheumatology Staff  [facility]  [phone]

## 2017-08-01 LAB — COLOGUARD-ABSTRACT: NEGATIVE

## 2017-10-03 ENCOUNTER — TELEPHONE (OUTPATIENT)
Dept: DERMATOLOGY | Facility: CLINIC | Age: 57
End: 2017-10-03

## 2017-10-04 NOTE — TELEPHONE ENCOUNTER
Liaison rec'd faxed paperwork regarding renewing pt's free drug su for Xeljanz thru [employer] for 2018. Paperwork must be completed by 10/31/17.     - HCP portion left for MD to sign  - Pt portion started and mailed to her

## 2017-12-21 ENCOUNTER — OFFICE VISIT (OUTPATIENT)
Dept: DERMATOLOGY | Facility: CLINIC | Age: 57
End: 2017-12-21
Payer: COMMERCIAL

## 2017-12-21 VITALS — HEART RATE: 63 BPM | DIASTOLIC BLOOD PRESSURE: 80 MMHG | SYSTOLIC BLOOD PRESSURE: 127 MMHG

## 2017-12-21 DIAGNOSIS — L30.9 DERMATITIS: ICD-10-CM

## 2017-12-21 DIAGNOSIS — L63.9 ALOPECIA AREATA: ICD-10-CM

## 2017-12-21 DIAGNOSIS — Z51.81 MEDICATION MONITORING ENCOUNTER: ICD-10-CM

## 2017-12-21 DIAGNOSIS — L63.9 AA (ALOPECIA AREATA): Primary | ICD-10-CM

## 2017-12-21 DIAGNOSIS — M08.00 JRA (JUVENILE RHEUMATOID ARTHRITIS) (H): ICD-10-CM

## 2017-12-21 DIAGNOSIS — M08.80 JIA (JUVENILE IDIOPATHIC ARTHRITIS) (H): ICD-10-CM

## 2017-12-21 LAB
ALBUMIN SERPL-MCNC: 3.8 G/DL (ref 3.4–5)
ALP SERPL-CCNC: 112 U/L (ref 40–150)
ALT SERPL W P-5'-P-CCNC: 29 U/L (ref 0–50)
ANION GAP SERPL CALCULATED.3IONS-SCNC: 7 MMOL/L (ref 3–14)
AST SERPL W P-5'-P-CCNC: 22 U/L (ref 0–45)
BASOPHILS # BLD AUTO: 0.1 10E9/L (ref 0–0.2)
BASOPHILS NFR BLD AUTO: 1.4 %
BILIRUB SERPL-MCNC: 0.8 MG/DL (ref 0.2–1.3)
BUN SERPL-MCNC: 14 MG/DL (ref 7–30)
CALCIUM SERPL-MCNC: 9.1 MG/DL (ref 8.5–10.1)
CHLORIDE SERPL-SCNC: 106 MMOL/L (ref 94–109)
CHOLEST SERPL-MCNC: 219 MG/DL
CO2 SERPL-SCNC: 26 MMOL/L (ref 20–32)
CREAT SERPL-MCNC: 0.71 MG/DL (ref 0.52–1.04)
DIFFERENTIAL METHOD BLD: NORMAL
EOSINOPHIL # BLD AUTO: 0.3 10E9/L (ref 0–0.7)
EOSINOPHIL NFR BLD AUTO: 4 %
ERYTHROCYTE [DISTWIDTH] IN BLOOD BY AUTOMATED COUNT: 12.2 % (ref 10–15)
GFR SERPL CREATININE-BSD FRML MDRD: 85 ML/MIN/1.7M2
GLUCOSE SERPL-MCNC: 93 MG/DL (ref 70–99)
HCT VFR BLD AUTO: 42.8 % (ref 35–47)
HDLC SERPL-MCNC: 73 MG/DL
HGB BLD-MCNC: 14.1 G/DL (ref 11.7–15.7)
IMM GRANULOCYTES # BLD: 0 10E9/L (ref 0–0.4)
IMM GRANULOCYTES NFR BLD: 0.3 %
LDLC SERPL CALC-MCNC: 108 MG/DL
LYMPHOCYTES # BLD AUTO: 3.2 10E9/L (ref 0.8–5.3)
LYMPHOCYTES NFR BLD AUTO: 45 %
MCH RBC QN AUTO: 30.8 PG (ref 26.5–33)
MCHC RBC AUTO-ENTMCNC: 32.9 G/DL (ref 31.5–36.5)
MCV RBC AUTO: 93 FL (ref 78–100)
MONOCYTES # BLD AUTO: 0.3 10E9/L (ref 0–1.3)
MONOCYTES NFR BLD AUTO: 4.5 %
NEUTROPHILS # BLD AUTO: 3.2 10E9/L (ref 1.6–8.3)
NEUTROPHILS NFR BLD AUTO: 44.8 %
NONHDLC SERPL-MCNC: 146 MG/DL
NRBC # BLD AUTO: 0 10*3/UL
NRBC BLD AUTO-RTO: 0 /100
PLATELET # BLD AUTO: 310 10E9/L (ref 150–450)
POTASSIUM SERPL-SCNC: 3.9 MMOL/L (ref 3.4–5.3)
PROT SERPL-MCNC: 7.6 G/DL (ref 6.8–8.8)
RBC # BLD AUTO: 4.58 10E12/L (ref 3.8–5.2)
SODIUM SERPL-SCNC: 140 MMOL/L (ref 133–144)
TRIGL SERPL-MCNC: 191 MG/DL
WBC # BLD AUTO: 7.2 10E9/L (ref 4–11)

## 2017-12-21 RX ORDER — KETOCONAZOLE 20 MG/ML
SHAMPOO TOPICAL
Qty: 120 ML | Refills: 11 | Status: SHIPPED | OUTPATIENT
Start: 2017-12-21 | End: 2018-07-31

## 2017-12-21 RX ORDER — TRIAMCINOLONE ACETONIDE 1 MG/G
CREAM TOPICAL
Qty: 80 G | Refills: 2 | Status: SHIPPED | OUTPATIENT
Start: 2017-12-21 | End: 2023-01-05

## 2017-12-21 ASSESSMENT — PAIN SCALES - GENERAL
PAINLEVEL: NO PAIN (1)
PAINLEVEL: NO PAIN (0)

## 2017-12-21 NOTE — NURSING NOTE
"Dermatology Rooming Note    Eliane Lockhart's goals for this visit include:   Chief Complaint   Patient presents with     Derm Problem     Eliane Hall states \" It is coming back in some areas not as much in others.\"     Madie Paiz LPN  "

## 2017-12-21 NOTE — LETTER
"12/21/2017       RE: Eliane Lockhart  [address]  [place] [address]     Dear Colleague,    Thank you for referring your patient, Eliane Lockhart, to the [facility] DERMATOLOGY at [facility]. Please see a copy of my visit note below.    [facility] Dermatology Note      Dermatology Problem List:   1.  Alopecia areata, rapidly progressive, onset 10/2010 with scalp folliculitis.  Currently on oral tofacitinib.  Repeat trial of ILK 12/21/2017.   2.  ROSARIO/JRA, improved with above.   3.  Mild dermatitis, ears, triamcinolone cream.       Encounter Date:  12/21/2017      CC: Patient presents with:  Derm Problem: Eliane Hall states \" It is coming back in some areas not as much in others.\"    History of Present Illness:   Eliane Lockhart is a pleasant, 57-year-old woman who presents today in followup for alopecia areata and ROSARIO/JRA.  She was last seen 06/29/2017 by Dr. Alejo.  At that point, she had been on tofacitinib for approximately 6 weeks for her alopecia areata.  She has had significant improvement.  She notes that the improvement has continued over the course of the last several months.  She missed her last 3 month visit given some difficulty with shingles in the left low back that was treated by her PCP with prednisone as well as Valtrex.  She has had complete resolution of her symptoms with only mild scarring sequelae over the left low back at this point.  She denies any other significant infectious symptoms, including fevers, chills, sweats, unexplained weight loss, new lumps or bumps, respiratory symptoms, GI symptoms, among others.  The patient notes her joints are significantly improved on this medication.  She has not seen Rheumatology here despite previous referral.  She overall is doing quite well.  She is pleased with the current regimen.  She is open to potentially trying injections again, although these have not been palliative " in the past.  She is using ketaconazole shampoo approximately twice weekly.  She has had some increased itching and scaling in the ears bilaterally that she would like us to evaluate.      Past Medical History:    JRA/ROSARIO, treated with methotrexate in the distant past, now with NSAIDs and improved with tofacitinib as above, obesity, hyperlipidemia, asthma requiring multiple inhalers and intermittent prednisone.      Family History:   Daughter with atopic dermatitis; otherwise, no chronic skin or autoimmune conditions.        Current Outpatient Prescriptions   Medication     tofacitinib (XELJANZ) 5 MG tablet     ketoconazole (NIZORAL) 2 % shampoo     triamcinolone (KENALOG) 0.1 % cream     fluticasone-salmeterol (ADVAIR DISKUS) 250-50 MCG/DOSE diskus inhaler     albuterol (2.5 MG/3ML) 0.083% neb solution     HYDROcodone-acetaminophen (NORCO) 5-325 MG per tablet     montelukast (SINGULAIR) 10 MG tablet     simvastatin (ZOCOR) 80 MG tablet     triamterene (DYRENIUM) 50 MG capsule     albuterol (VENTOLIN HFA) 108 (90 BASE) MCG/ACT Inhaler     gentamicin (GARAMYCIN) 0.1 % cream     predniSONE (DELTASONE) 5 MG tablet     No current facility-administered medications for this visit.       Allergies   Allergen Reactions     Seasonal Allergies Other (See Comments)     Runny nose, eyes, difficulty breathing      Review of Systems:   A 10 point review of systems was negative beyond that stated above in the HPI.       Physical exam:   GENERAL:  A well-appearing  female appearing her stated age, affect normal, cooperative with the exam, alert and oriented x3.     SKIN:  A focused examination of the scalp, face, neck, bilateral hands, nails and back is performed.  The patient has somewhat cribriform scarring without any active lesions in the left low back.  She has mild erythema and scaling over the bilateral pinnae externally, right greater than left.  She has scattered alopecic patches overlying short, thin hair.  These  are most prominent in a urbano shape over the frontal and vertex scalp, 1 on the right parietal scalp, 2 on the vertex and slightly on the occipital area as well.  There is scattered regrowth.  Several hairs are quite vellus overlying the regrowing areas.  No ongoing follicular papules today.  No other lesions noted today.      Labs: She is due for labs at this point.  Previous labs reviewed from 06/29/2017 were within normal limits.       Impression/Plan:   1.  Alopecia areata, rapidly progressive with secondary folliculitis.  History of JRA/ROSARIO. Both conditions significantly improved on Tofacitinib, which she has been on since approximately 05/2017.  She has tolerated this well without any significant side effects.  Again, we counseled her on the risks and benefits of the treatment approaches.  After discussion, the patient would be apt to continue on the medication.  She is due for safety labs today, which will be repeated.  At this point, she has several alopecic patches that could potentially be improved with topical or injectable treatment.  After discussing the risks and benefits, we will trial again injectable corticosteroids to assess improvement.   - Continue Xeljanz 5 mg b.i.d.    - Safety labs:  CBC, CMP, lipid panel ordered today.  We will repeat labs in 3 months pending results.      - Continue with ketoconazole shampoo approximately 2-3 times weekly to the scalp.   - We will give a trial of minoxidil 5% foam, double dose once daily to the affected scalp in an attempt to make the current vellus hair regrowth more robust.   - Intralesional Kenalog injections were performed today.  Approximately 20 sites were infiltrated with Kenalog 10 mg/mL.  Approximately 1.7 mL of Kenalog was utilized today, and this was well tolerated.     - We spent a significant portion of the examination answering many of her questions and reviewing the risks and benefits of the medication that she is on today.  All questions  were answered to her apparent satisfaction.     2.  Mild dermatitis on external pinnae bilaterally.  We will start triamcinolone 0.1% cream b.i.d. p.r.n. as needed.      The patient was seen and discussed with Dr. Coco Olivo, who was staff for this encounter.      Follow up in 3 months or sooner if improved with intralesional Kenalog injections.       This note was dictated and edited by MD Edmund Erwin MD  PGY3 Dermatology  [phone]      Staff Involved:   Dictated by Resident(Edmund Leonard MD)/Staff(as above)   I have personally examined this patient and agree with Dr. Leonard's documentation and plan of care. I have reviewed and amended the resident's note above. The documentation accurately reflects my clinical observations, diagnoses, treatment and follow-up plans. I was present for key portions of the procedure.     Coco Olivo MD  Dermatology Staff    Again, thank you for allowing me to participate in the care of your patient.    Sincerely,  Edmund Leonard MD

## 2017-12-21 NOTE — PATIENT INSTRUCTIONS
Labs today    Continue on Xeljanz    Start a once daily application (double dose) of 5% minoxidil foam to the affected scalp.     Continue on the ketoconazole.

## 2017-12-21 NOTE — NURSING NOTE
Drug Administration Record    Drug Name: triamcinolone acetonide(kenalog)  Dose: 1.7mL of triamcinolone 10mg/mL, mg dose  Route administered: ID  NDC #: Kenalog-10 ([address]-82)  Amount of waste(mL):3.3  Reason for waste: Single use vial

## 2017-12-21 NOTE — MR AVS SNAPSHOT
After Visit Summary   12/21/2017    Eliane Lockhart    MRN: [id number]           Patient Information     Date Of Birth          1960        Visit Information        Provider Department      12/21/2017 8:15 AM Edmund Leonard MD [facility] Dermatology        Today's Diagnoses     AA (alopecia areata)    -  1    ROSARIO (juvenile idiopathic arthritis) (H)        JRA (juvenile rheumatoid arthritis) (H)        Alopecia areata        Medication monitoring encounter          Care Instructions    Labs today    Continue on Xeljanz    Start a once daily application (double dose) of 5% minoxidil foam to the affected scalp.     Continue on the ketoconazole.           Follow-ups after your visit        Follow-up notes from your care team     Return in about 3 months (around 3/21/2018).      Future tests that were ordered for you today     Open Future Orders        Priority Expected Expires Ordered    CBC with platelets differential Routine  12/21/2018 12/21/2017    Comprehensive metabolic panel Routine  12/21/2018 12/21/2017            Who to contact     Please call your clinic at [phone] to:    Ask questions about your health    Make or cancel appointments    Discuss your medicines    Learn about your test results    Speak to your doctor   If you have compliments or concerns about an experience at your clinic, or if you wish to file a complaint, please contact [facility] Patient Relations at [phone] or email us at [email or web address]         Additional Information About Your Visit        MyChart Information     MyChart gives you secure access to your electronic health record. If you see a primary care provider, you can also send messages to your care team and make appointments. If you have questions, please call your primary care clinic.  If you do not have a primary care provider, please call [phone] and they will assist you.      [employer] is an  electronic gateway that provides easy, online access to your medical records. With [employer], you can request a clinic appointment, read your test results, renew a prescription or communicate with your care team.     To access your existing account, please contact your [facility] or call [phone] for assistance.        Care EveryWhere ID     This is your Care EveryWhere ID. This could be used by other organizations to access your [place] medical records  [id number]        Your Vitals Were     Pulse                   63            Blood Pressure from Last 3 Encounters:   12/21/17 127/80   06/29/17 117/82   01/26/17 137/89    Weight from Last 3 Encounters:   No data found for Wt              We Performed the Following     INJECTION INTO SKIN LESIONS >7     Lipid Profile          Today's Medication Changes          These changes are accurate as of: 12/21/17  8:30 AM.  If you have any questions, ask your nurse or doctor.               Start taking these medicines.        Dose/Directions    triamcinolone acetonide 10 MG/ML injection   Commonly known as:  KENALOG   Used for:  AA (alopecia areata)        Dose:  10 mg   Inject 1 mL (10 mg) into the skin once for 1 dose   Quantity:  2 mL   Refills:  0            Where to get your medicines      These medications were sent to [facility] - [address] [address]     Phone:  [phone]     ketoconazole 2 % shampoo         Some of these will need a paper prescription and others can be bought over the counter.  Ask your nurse if you have questions.     You don't need a prescription for these medications     triamcinolone acetonide 10 MG/ML injection         Call your pharmacy to confirm that your medication is ready for pickup. It may take up to 24 hours for them to receive the prescription. If the prescription is not ready within 3  business days, please contact your clinic or your provider.     We will let you know when these medications are ready. If you don't hear back within 3 business days, please contact us.     tofacitinib 5 MG tablet                Primary Care Provider Office Phone # Fax #    Madai Elizondo [phone]       [facility] [address]  [facility] [address]        Equal Access to Services     [facility] : Hadii aad ku hadasho Soomaali, waaxda luqadaha, qaybta kaalmada adeegyada, waxay idiin hayjonathann adeeg laurent laeliotjeison . So [facility] [phone].    ATENCIÓN: Si rob torre, tiene a griffith disposición servicios gratuitos de asistencia lingüística. [facility] [phone].    We comply with applicable federal civil rights laws and [place] laws. We do not discriminate on the basis of race, color, national origin, age, disability, sex, sexual orientation, or gender identity.            Thank you!     Thank you for choosing [facility] DERMATOLOG[facility]  for your care. Our goal is always to provide you with excellent care. Hearing back from our patients is one way we can continue to improve our services. Please take a few minutes to complete the written survey that you may receive in the mail after your visit with us. Thank you!             Your Updated Medication List - Protect others around you: Learn how to safely use, store and throw away your medicines at www.disposemymeds.org.          This list is accurate as of: 12/21/17  8:30 AM.  Always use your most recent med list.                   Brand Name Dispense Instructions for use Diagnosis    ADVAIR DISKUS 250-50 MCG/DOSE diskus inhaler   Generic drug:  fluticasone-salmeterol       Alopecia areata       * VENTOLIN  (90 BASE) MCG/ACT Inhaler   Generic drug:  albuterol       Alopecia areata       * albuterol (2.5 MG/3ML) 0.083% neb solution       Alopecia areata       gentamicin 0.1 % cream    GARAMYCIN      Alopecia areata        HYDROcodone-acetaminophen 5-325 MG per tablet    NORCO      Alopecia areata       ketoconazole 2 % shampoo    NIZORAL    120 mL    Three times weekly: lather into scalp, leave in place for 3-5 minutes, then rinse.    Alopecia areata       montelukast 10 MG tablet    SINGULAIR      Alopecia areata       predniSONE 5 MG tablet    DELTASONE    124 tablet    Take 8 tablets for 5 days, 7 tablets for 3 days, 6 tablets for 3 days, 5 tablets for 3 days, 4 tablets for 3 days, 3 tablets for 3 days, 2 tablets for 3 days, then 1 tablet for 3 days.    Alopecia areata       simvastatin 80 MG tablet    ZOCOR      Alopecia areata       tofacitinib 5 MG tablet    XELJANZ    60 tablet    Take 1 tablet (5 mg) by mouth 2 times daily    AA (alopecia areata)       triamcinolone acetonide 10 MG/ML injection    KENALOG    2 mL    Inject 1 mL (10 mg) into the skin once for 1 dose    AA (alopecia areata)       triamterene 50 MG capsule    DYRENIUM      Alopecia areata       * Notice:  This list has 2 medication(s) that are the same as other medications prescribed for you. Read the directions carefully, and ask your doctor or other care provider to review them with you.

## 2017-12-21 NOTE — PROGRESS NOTES
"[facility] Dermatology Note      Dermatology Problem List:   1.  Alopecia areata, rapidly progressive, onset 10/2010 with scalp folliculitis.  Currently on oral tofacitinib.  Repeat trial of ILK 12/21/2017.   2.  ROSARIO/JRA, improved with above.   3.  Mild dermatitis, ears, triamcinolone cream.       Encounter Date:  12/21/2017      CC: Patient presents with:  Derm Problem: Eliane Hall states \" It is coming back in some areas not as much in others.\"         History of Present Illness:   Eliane Lockhart is a pleasant, 57-year-old woman who presents today in followup for alopecia areata and ROSARIO/JRA.  She was last seen 06/29/2017 by Dr. Alejo.  At that point, she had been on tofacitinib for approximately 6 weeks for her alopecia areata.  She has had significant improvement.  She notes that the improvement has continued over the course of the last several months.  She missed her last 3 month visit given some difficulty with shingles in the left low back that was treated by her PCP with prednisone as well as Valtrex.  She has had complete resolution of her symptoms with only mild scarring sequelae over the left low back at this point.  She denies any other significant infectious symptoms, including fevers, chills, sweats, unexplained weight loss, new lumps or bumps, respiratory symptoms, GI symptoms, among others.  The patient notes her joints are significantly improved on this medication.  She has not seen Rheumatology here despite previous referral.  She overall is doing quite well.  She is pleased with the current regimen.  She is open to potentially trying injections again, although these have not been palliative in the past.  She is using ketaconazole shampoo approximately twice weekly.  She has had some increased itching and scaling in the ears bilaterally that she would like us to evaluate.      Past Medical History:    JRA/ROSARIO, treated with methotrexate in the distant past, now with " NSAIDs and improved with tofacitinib as above, obesity, hyperlipidemia, asthma requiring multiple inhalers and intermittent prednisone.      Family History:   Daughter with atopic dermatitis; otherwise, no chronic skin or autoimmune conditions.        Current Outpatient Prescriptions   Medication     tofacitinib (XELJANZ) 5 MG tablet     ketoconazole (NIZORAL) 2 % shampoo     triamcinolone (KENALOG) 0.1 % cream     fluticasone-salmeterol (ADVAIR DISKUS) 250-50 MCG/DOSE diskus inhaler     albuterol (2.5 MG/3ML) 0.083% neb solution     HYDROcodone-acetaminophen (NORCO) 5-325 MG per tablet     montelukast (SINGULAIR) 10 MG tablet     simvastatin (ZOCOR) 80 MG tablet     triamterene (DYRENIUM) 50 MG capsule     albuterol (VENTOLIN HFA) 108 (90 BASE) MCG/ACT Inhaler     gentamicin (GARAMYCIN) 0.1 % cream     predniSONE (DELTASONE) 5 MG tablet     No current facility-administered medications for this visit.         Allergies   Allergen Reactions     Seasonal Allergies Other (See Comments)     Runny nose, eyes, difficulty breathing         Review of Systems:   A 10 point review of systems was negative beyond that stated above in the HPI.       Physical exam:   GENERAL:  A well-appearing  female appearing her stated age, affect normal, cooperative with the exam, alert and oriented x3.     SKIN:  A focused examination of the scalp, face, neck, bilateral hands, nails and back is performed.  The patient has somewhat cribriform scarring without any active lesions in the left low back.  She has mild erythema and scaling over the bilateral pinnae externally, right greater than left.  She has scattered alopecic patches overlying short, thin hair.  These are most prominent in a urbano shape over the frontal and vertex scalp, 1 on the right parietal scalp, 2 on the vertex and slightly on the occipital area as well.  There is scattered regrowth.  Several hairs are quite vellus overlying the regrowing areas.  No ongoing  follicular papules today.  No other lesions noted today.      Labs: She is due for labs at this point.  Previous labs reviewed from 06/29/2017 were within normal limits.       Impression/Plan:   1.  Alopecia areata, rapidly progressive with secondary folliculitis.  History of JRA/ROSARIO. Both conditions significantly improved on Tofacitinib, which she has been on since approximately 05/2017.  She has tolerated this well without any significant side effects.  Again, we counseled her on the risks and benefits of the treatment approaches.  After discussion, the patient would be apt to continue on the medication.  She is due for safety labs today, which will be repeated.  At this point, she has several alopecic patches that could potentially be improved with topical or injectable treatment.  After discussing the risks and benefits, we will trial again injectable corticosteroids to assess improvement.   - Continue Xeljanz 5 mg b.i.d.    - Safety labs:  CBC, CMP, lipid panel ordered today.  We will repeat labs in 3 months pending results.      - Continue with ketoconazole shampoo approximately 2-3 times weekly to the scalp.   - We will give a trial of minoxidil 5% foam, double dose once daily to the affected scalp in an attempt to make the current vellus hair regrowth more robust.   - Intralesional Kenalog injections were performed today.  Approximately 20 sites were infiltrated with Kenalog 10 mg/mL.  Approximately 1.7 mL of Kenalog was utilized today, and this was well tolerated.     - We spent a significant portion of the examination answering many of her questions and reviewing the risks and benefits of the medication that she is on today.  All questions were answered to her apparent satisfaction.       2.  Mild dermatitis on external pinnae bilaterally.  We will start triamcinolone 0.1% cream b.i.d. p.r.n. as needed.      The patient was seen and discussed with Dr. Coco Olivo, who was staff for this encounter.       Follow up in 3 months or sooner if improved with intralesional Kenalog injections.       This note was dictated and edited by MD Edmund Erwin MD  PGY3 Dermatology  [phone]         Staff Involved:   Dictated by Resident(Edmund Leonard MD)/Staff(as above)   I have personally examined this patient and agree with Dr. Leonard's documentation and plan of care. I have reviewed and amended the resident's note above. The documentation accurately reflects my clinical observations, diagnoses, treatment and follow-up plans. I was present for key portions of the procedure.       Coco Olivo MD  Dermatology Staff

## 2017-12-24 ENCOUNTER — HEALTH MAINTENANCE LETTER (OUTPATIENT)
Age: 57
End: 2017-12-24

## 2018-03-22 ENCOUNTER — OFFICE VISIT (OUTPATIENT)
Dept: DERMATOLOGY | Facility: CLINIC | Age: 58
End: 2018-03-22
Payer: COMMERCIAL

## 2018-03-22 VITALS — DIASTOLIC BLOOD PRESSURE: 71 MMHG | HEART RATE: 58 BPM | SYSTOLIC BLOOD PRESSURE: 104 MMHG

## 2018-03-22 DIAGNOSIS — L30.9 DERMATITIS: ICD-10-CM

## 2018-03-22 DIAGNOSIS — Z79.899 ENCOUNTER FOR LONG-TERM (CURRENT) USE OF HIGH-RISK MEDICATION: ICD-10-CM

## 2018-03-22 DIAGNOSIS — L63.9 ALOPECIA AREATA: Primary | ICD-10-CM

## 2018-03-22 DIAGNOSIS — L63.9 ALOPECIA AREATA: ICD-10-CM

## 2018-03-22 DIAGNOSIS — M08.80 JIA (JUVENILE IDIOPATHIC ARTHRITIS) (H): ICD-10-CM

## 2018-03-22 LAB
ALBUMIN SERPL-MCNC: 4 G/DL (ref 3.4–5)
ALBUMIN UR-MCNC: NEGATIVE MG/DL
ALP SERPL-CCNC: 107 U/L (ref 40–150)
ALT SERPL W P-5'-P-CCNC: 42 U/L (ref 0–50)
ANION GAP SERPL CALCULATED.3IONS-SCNC: 7 MMOL/L (ref 3–14)
APPEARANCE UR: ABNORMAL
AST SERPL W P-5'-P-CCNC: 29 U/L (ref 0–45)
BASOPHILS # BLD AUTO: 0.1 10E9/L (ref 0–0.2)
BASOPHILS NFR BLD AUTO: 0.8 %
BILIRUB SERPL-MCNC: 0.9 MG/DL (ref 0.2–1.3)
BILIRUB UR QL STRIP: NEGATIVE
BUN SERPL-MCNC: 22 MG/DL (ref 7–30)
CALCIUM SERPL-MCNC: 9.4 MG/DL (ref 8.5–10.1)
CHLORIDE SERPL-SCNC: 103 MMOL/L (ref 94–109)
CHOLEST SERPL-MCNC: 165 MG/DL
CO2 SERPL-SCNC: 28 MMOL/L (ref 20–32)
COLOR UR AUTO: YELLOW
CREAT SERPL-MCNC: 0.82 MG/DL (ref 0.52–1.04)
DIFFERENTIAL METHOD BLD: NORMAL
EOSINOPHIL # BLD AUTO: 0.3 10E9/L (ref 0–0.7)
EOSINOPHIL NFR BLD AUTO: 3.1 %
ERYTHROCYTE [DISTWIDTH] IN BLOOD BY AUTOMATED COUNT: 12.2 % (ref 10–15)
GFR SERPL CREATININE-BSD FRML MDRD: 71 ML/MIN/1.7M2
GLUCOSE SERPL-MCNC: 94 MG/DL (ref 70–99)
GLUCOSE UR STRIP-MCNC: NEGATIVE MG/DL
HCT VFR BLD AUTO: 42.1 % (ref 35–47)
HDLC SERPL-MCNC: 74 MG/DL
HGB BLD-MCNC: 14.2 G/DL (ref 11.7–15.7)
HGB UR QL STRIP: NEGATIVE
IMM GRANULOCYTES # BLD: 0 10E9/L (ref 0–0.4)
IMM GRANULOCYTES NFR BLD: 0.3 %
KETONES UR STRIP-MCNC: NEGATIVE MG/DL
LDLC SERPL CALC-MCNC: 74 MG/DL
LEUKOCYTE ESTERASE UR QL STRIP: ABNORMAL
LYMPHOCYTES # BLD AUTO: 2.4 10E9/L (ref 0.8–5.3)
LYMPHOCYTES NFR BLD AUTO: 26.9 %
MCH RBC QN AUTO: 31.4 PG (ref 26.5–33)
MCHC RBC AUTO-ENTMCNC: 33.7 G/DL (ref 31.5–36.5)
MCV RBC AUTO: 93 FL (ref 78–100)
MONOCYTES # BLD AUTO: 0.4 10E9/L (ref 0–1.3)
MONOCYTES NFR BLD AUTO: 4.9 %
NEUTROPHILS # BLD AUTO: 5.7 10E9/L (ref 1.6–8.3)
NEUTROPHILS NFR BLD AUTO: 64 %
NITRATE UR QL: NEGATIVE
NONHDLC SERPL-MCNC: 91 MG/DL
NRBC # BLD AUTO: 0 10*3/UL
NRBC BLD AUTO-RTO: 0 /100
PH UR STRIP: 6 PH (ref 5–7)
PLATELET # BLD AUTO: 296 10E9/L (ref 150–450)
POTASSIUM SERPL-SCNC: 4.2 MMOL/L (ref 3.4–5.3)
PROT SERPL-MCNC: 7.6 G/DL (ref 6.8–8.8)
RBC # BLD AUTO: 4.52 10E12/L (ref 3.8–5.2)
RBC #/AREA URNS AUTO: 1 /HPF (ref 0–2)
SODIUM SERPL-SCNC: 138 MMOL/L (ref 133–144)
SOURCE: ABNORMAL
SP GR UR STRIP: 1.01 (ref 1–1.03)
SQUAMOUS #/AREA URNS AUTO: 1 /HPF (ref 0–1)
TRIGL SERPL-MCNC: 86 MG/DL
UROBILINOGEN UR STRIP-MCNC: 0 MG/DL (ref 0–2)
WBC # BLD AUTO: 8.8 10E9/L (ref 4–11)
WBC #/AREA URNS AUTO: 1 /HPF (ref 0–5)

## 2018-03-22 ASSESSMENT — PAIN SCALES - GENERAL
PAINLEVEL: NO PAIN (1)
PAINLEVEL: NO PAIN (0)

## 2018-03-22 NOTE — PROGRESS NOTES
"[facility] Dermatology Note      Dermatology Problem List:   1.  Alopecia areata, rapidly progressive, onset 10/2010 with scalp folliculitis.  Currently on oral tofacitinib.  Repeat ILK 12/21/2017 and 3/22/2018 .   2.  ROSARIO/JRA, improved with above. Ref to rheum 3/22/2018.   3.  Mild dermatitis, ears, triamcinolone cream.       Encounter Date:  3/22/2018      CC: Patient presents with:  Derm Problem: Eliane Hall states \" I think it is better.\"         History of Present Illness:   Eliane Lockhart is a pleasant, 57-year-old woman who presents today in followup for alopecia areata and ROSARIO/JRA.  She was last seen 12/21/2017. Since that time she has continued on the tofacitinib 5mg BID. She continues to do well on this. We did a trial of ILK which was quite affective in improving regrowth for her. She is interested in repeat injections.  She denies significant infectious symptoms, including fevers, chills, sweats, unexplained weight loss, new lumps or bumps, respiratory symptoms, GI symptoms, among others.  The patient notes her joints are significantly improved on this medication.  She has not seen Rheumatology here despite previous referral.  She overall is doing quite well.  She is pleased with the current regimen.  Ears are improved with TAC; not requiring often at this point.        Past Medical History:    JRA/ROSARIO, treated with methotrexate in the distant past, now with NSAIDs and improved with tofacitinib as above, obesity, hyperlipidemia, asthma requiring multiple inhalers and intermittent prednisone.      Family History:   Daughter with atopic dermatitis; otherwise, no chronic skin or autoimmune conditions.        Current Outpatient Prescriptions   Medication     triamcinolone acetonide (KENALOG) 10 MG/ML injection     tofacitinib (XELJANZ) 5 MG tablet     ketoconazole (NIZORAL) 2 % shampoo     triamcinolone (KENALOG) 0.1 % cream     albuterol (2.5 MG/3ML) 0.083% neb solution     " HYDROcodone-acetaminophen (NORCO) 5-325 MG per tablet     montelukast (SINGULAIR) 10 MG tablet     simvastatin (ZOCOR) 80 MG tablet     triamterene (DYRENIUM) 50 MG capsule     albuterol (VENTOLIN HFA) 108 (90 BASE) MCG/ACT Inhaler     gentamicin (GARAMYCIN) 0.1 % cream     predniSONE (DELTASONE) 5 MG tablet     fluticasone-salmeterol (ADVAIR DISKUS) 250-50 MCG/DOSE diskus inhaler     No current facility-administered medications for this visit.         Allergies   Allergen Reactions     Seasonal Allergies Other (See Comments)     Runny nose, eyes, difficulty breathing         Review of Systems:   A 10 point review of systems was negative beyond that stated above in the HPI.       Physical exam:   /71  Pulse 58   GENERAL:  A well-appearing  female appearing her stated age, affect normal, cooperative with the exam, alert and oriented x3.     SKIN:  A focused examination of the scalp, face, neck, bilateral hands, nails and back is performed.  The patient has somewhat cribriform scarring without any active lesions in the left low back.  Largely normal terminal hairgrowth with the exception of a  urbano shape patch of thinning over the frontal and vertex scalp. Prominent regrowth.  No other lesions noted today.  See photos.     Labs: She is due for labs at this point.  Previous labs reviewed from 12/21/2017  were within normal limits.       Impression/Plan:   1.  Alopecia areata, rapidly progressive with secondary folliculitis, now much improved.  History of JRA/ROSARIO. Both conditions significantly improved on Tofacitinib, which she has been on since approximately 05/2017.  She has tolerated this well without any significant side effects.  Again, we counseled her on the risks and benefits of the treatment approaches.  After discussion, the patient would be apt to continue on the medication.  She is due for safety labs today, which will be repeated. Great response to ILK at the last visit; will repeat  today.   - Continue Xeljanz 5 mg b.i.d.    - Safety labs:  CBC, CMP, lipid panel, UA ordered today.  We will repeat labs in 3 months pending results.    Standing orders placed.   - Continue with ketoconazole shampoo approximately 2-3 times weekly to the scalp.   - Continue minoxidil 5% foam, double dose once daily to the affected scalp.  - Intralesional Kenalog injections were performed today.  Approximately 20 sites were infiltrated with Kenalog 10 mg/mL.  Approximately 1.0 mL of Kenalog was utilized today, and this was well tolerated.     -Referral to rheumatology; appreciate assistance with determining treatment course/duration going forward.   - We spent a significant portion of the examination answering many of her questions and reviewing the risks and benefits of the medication that she is on today.  All questions were answered to her apparent satisfaction.       2.  Mild dermatitis on external pinnae bilaterally; Resolved. Continue PRNtriamcinolone 0.1% cream b.i.d. p.r.n. as needed.      The patient was seen and discussed with Dr. Jayda Siddiqi, who was staff for this encounter.      Follow up in 3 months or sooner PRN.       This note was dictated and edited by MD Edmund Erwin MD  PGY3 Dermatology  [phone]           Patient was seen and examined with the dermatology resident. I agree with the history, review of systems, physical examination, assessments and plan. I was present for the key portions of the ILK procedure.    Jayda Siddiqi MD  Professor and  Chair  Department of Dermatology  [facility]

## 2018-03-22 NOTE — NURSING NOTE
"Dermatology Rooming Note    Eliane Lockhart's goals for this visit include:   Chief Complaint   Patient presents with     Derm Problem     Eliane Hall states \" I think it is better.\"     Madie Paiz LPN  "

## 2018-03-22 NOTE — PATIENT INSTRUCTIONS
Preventive Care:    Colorectal Cancer Screening: During our visit today, we discussed that it is recommended you receive colorectal cancer screening. Please call or make an appointment with your primary care provider to discuss this. You may also call the [facility] scheduling line ([phone]) to set up a colonoscopy appointment.

## 2018-03-22 NOTE — LETTER
"3/22/2018       RE: Eliane Lockhart  [address]  [facility] [address]     Dear Colleague,    Thank you for referring your patient, Eliane Lockhart, to the [facility] DERMATOLOGY at [facility]. Please see a copy of my visit note below.    [facility] Dermatology Note      Dermatology Problem List:   1.  Alopecia areata, rapidly progressive, onset 10/2010 with scalp folliculitis.  Currently on oral tofacitinib.  Repeat ILK 12/21/2017 and 3/22/2018 .   2.  ROSARIO/JRA, improved with above. Ref to rheum 3/22/2018.   3.  Mild dermatitis, ears, triamcinolone cream.       Encounter Date:  3/22/2018      CC: Patient presents with:  Derm Problem: ReganoctaviaEliane griggs states \" I think it is better.\"         History of Present Illness:   Eliane Lockhart is a pleasant, 57-year-old woman who presents today in followup for alopecia areata and ROSARIO/JRA.  She was last seen 12/21/2017. Since that time she has continued on the tofacitinib 5mg BID. She continues to do well on this. We did a trial of ILK which was quite affective in improving regrowth for her. She is interested in repeat injections.  She denies significant infectious symptoms, including fevers, chills, sweats, unexplained weight loss, new lumps or bumps, respiratory symptoms, GI symptoms, among others.  The patient notes her joints are significantly improved on this medication.  She has not seen Rheumatology here despite previous referral.  She overall is doing quite well.  She is pleased with the current regimen.  Ears are improved with TAC; not requiring often at this point.        Past Medical History:    JRA/ROSARIO, treated with methotrexate in the distant past, now with NSAIDs and improved with tofacitinib as above, obesity, hyperlipidemia, asthma requiring multiple inhalers and intermittent prednisone.      Family History:   Daughter with atopic dermatitis; otherwise, no chronic skin or autoimmune " conditions.        Current Outpatient Prescriptions   Medication     triamcinolone acetonide (KENALOG) 10 MG/ML injection     tofacitinib (XELJANZ) 5 MG tablet     ketoconazole (NIZORAL) 2 % shampoo     triamcinolone (KENALOG) 0.1 % cream     albuterol (2.5 MG/3ML) 0.083% neb solution     HYDROcodone-acetaminophen (NORCO) 5-325 MG per tablet     montelukast (SINGULAIR) 10 MG tablet     simvastatin (ZOCOR) 80 MG tablet     triamterene (DYRENIUM) 50 MG capsule     albuterol (VENTOLIN HFA) 108 (90 BASE) MCG/ACT Inhaler     gentamicin (GARAMYCIN) 0.1 % cream     predniSONE (DELTASONE) 5 MG tablet     fluticasone-salmeterol (ADVAIR DISKUS) 250-50 MCG/DOSE diskus inhaler     No current facility-administered medications for this visit.         Allergies   Allergen Reactions     Seasonal Allergies Other (See Comments)     Runny nose, eyes, difficulty breathing         Review of Systems:   A 10 point review of systems was negative beyond that stated above in the HPI.       Physical exam:   /71  Pulse 58   GENERAL:  A well-appearing  female appearing her stated age, affect normal, cooperative with the exam, alert and oriented x3.     SKIN:  A focused examination of the scalp, face, neck, bilateral hands, nails and back is performed.  The patient has somewhat cribriform scarring without any active lesions in the left low back.  Largely normal terminal hairgrowth with the exception of a  urbano shape patch of thinning over the frontal and vertex scalp. Prominent regrowth.  No other lesions noted today.  See photos.     Labs: She is due for labs at this point.  Previous labs reviewed from 12/21/2017  were within normal limits.       Impression/Plan:   1.  Alopecia areata, rapidly progressive with secondary folliculitis.  History of JRA/ROSARIO. Both conditions significantly improved on Tofacitinib, which she has been on since approximately 05/2017.  She has tolerated this well without any significant side effects.   Again, we counseled her on the risks and benefits of the treatment approaches.  After discussion, the patient would be apt to continue on the medication.  She is due for safety labs today, which will be repeated. Great response to ILK at the last visit; will repeat today.   - Continue Xeljanz 5 mg b.i.d.    - Safety labs:  CBC, CMP, lipid panel, UA ordered today.  We will repeat labs in 3 months pending results.    Standing orders placed.   - Continue with ketoconazole shampoo approximately 2-3 times weekly to the scalp.   - Continue minoxidil 5% foam, double dose once daily to the affected scalp.  - Intralesional Kenalog injections were performed today.  Approximately 20 sites were infiltrated with Kenalog 10 mg/mL.  Approximately 1.0 mL of Kenalog was utilized today, and this was well tolerated.     -Referral to rheumatology; appreciate assistance with determining treatment course/duration going forward.   - We spent a significant portion of the examination answering many of her questions and reviewing the risks and benefits of the medication that she is on today.  All questions were answered to her apparent satisfaction.       2.  Mild dermatitis on external pinnae bilaterally; Resolved. Continue PRNtriamcinolone 0.1% cream b.i.d. p.r.n. as needed.      The patient was seen and discussed with Dr. Jayda Siddiqi, who was staff for this encounter.      Follow up in 3 months or sooner PRN.       This note was dictated and edited by MD Edmund Erwin MD  PGY3 Dermatology  [phone]                                                 Again, thank you for allowing me to participate in the care of your patient.      Sincerely,    Edmund Leonard MD

## 2018-03-22 NOTE — MR AVS SNAPSHOT
After Visit Summary   3/22/2018    Eliane Lockhart    MRN: [id number]           Patient Information     Date Of Birth          1960        Visit Information        Provider Department      3/22/2018 8:00 AM Edmund Leonard MD [facility] Dermatology        Today's Diagnoses     Alopecia areata    -  1    ROSARIO (juvenile idiopathic arthritis) (H)          Care Instructions    Preventive Care:    Colorectal Cancer Screening: During our visit today, we discussed that it is recommended you receive colorectal cancer screening. Please call or make an appointment with your primary care provider to discuss this. You may also call the [facility] scheduling line ([phone]) to set up a colonoscopy appointment.              Follow-ups after your visit        Additional Services     RHEUMATOLOGY REFERRAL       Your provider has referred you to: [facility]: Rheumatology Clinic - [place] [phone]   [email or web address]    Please be aware that coverage of these services is subject to the terms and limitations of your health insurance plan.  Call member services at your health plan with any benefit or coverage questions.      Please bring the following with you to your appointment:    (1) Any X-Rays, CTs or MRIs which have been performed.  Contact the facility where they were done to arrange for  prior to your scheduled appointment.    (2) List of current medications   (3) This referral request   (4) Any documents/labs given to you for this referral                  Follow-up notes from your care team     Return in about 3 months (around 6/22/2018).      Your next 10 appointments already scheduled     Jun 28, 2018  7:00 AM CDT   (Arrive by 6:45 AM)   Return Visit with Edmund Leonard MD   [facility] Dermatology ([facility])    [address] [place] [id number] [phone]              Who to contact      Please call your clinic at [phone] to:    Ask questions about your health    Make or cancel appointments    Discuss your medicines    Learn about your test results    Speak to your doctor            Additional Information About Your Visit        [employer] Information     [employer] gives you secure access to your electronic health record. If you see a primary care provider, you can also send messages to your care team and make appointments. If you have questions, please call your primary care clinic.  If you do not have a primary care provider, please call [phone] and they will assist you.      [employer] is an electronic gateway that provides easy, online access to your medical records. With [employer], you can request a clinic appointment, read your test results, renew a prescription or communicate with your care team.     To access your existing account, please contact your [facility] or call [phone] for assistance.        Care EveryWhere ID     This is your Care EveryWhere ID. This could be used by other organizations to access your [facility] medical records  [id number]        Your Vitals Were     Pulse                   58            Blood Pressure from Last 3 Encounters:   No data found for BP    Weight from Last 3 Encounters:   No data found for Wt              Today, you had the following     No orders found for display         Today's Medication Changes          These changes are accurate as of 3/22/18 11:59 PM.  If you have any questions, ask your nurse or doctor.               Start taking these medicines.        Dose/Directions    triamcinolone acetonide 10 MG/ML injection   Commonly known as:  KENALOG   Used for:  Alopecia areata   Started by:  Edmund Leonard MD        Dose:  10 mg   Inject 1 mL (10 mg) into the skin once for 1 dose   Quantity:  2 mL   Refills:  0            Where to get your medicines      Some of these will need a paper prescription and  others can be bought over the counter.  Ask your nurse if you have questions.     You don't need a prescription for these medications     triamcinolone acetonide 10 MG/ML injection                Primary Care Provider Office Phone # Fax #    Madai Elizondo [phone]       [facility] [address]  [facility] [address]        Equal Access to Services     [facility] : Hadii aad ku hadasho Soomaali, waaxda luqadaha, qaybta kaalmada adeegyada, waxay idiin hayaan adeeg jhonatantetekaty lacindy . So [facility] [phone].    ATENCIÓN: Si habla espmary, tiene a griffith disposición servicios gratuitos de asistencia lingüística. [facility] [phone].    We comply with applicable federal civil rights laws and [place] laws. We do not discriminate on the basis of race, color, national origin, age, disability, sex, sexual orientation, or gender identity.            Thank you!     Thank you for choosing [facility] DERMATOLOGY  for your care. Our goal is always to provide you with excellent care. Hearing back from our patients is one way we can continue to improve our services. Please take a few minutes to complete the written survey that you may receive in the mail after your visit with us. Thank you!             Your Updated Medication List - Protect others around you: Learn how to safely use, store and throw away your medicines at www.disposemymeds.org.          This list is accurate as of 3/22/18 11:59 PM.  Always use your most recent med list.                   Brand Name Dispense Instructions for use Diagnosis    ADVAIR DISKUS 250-50 MCG/DOSE diskus inhaler   Generic drug:  fluticasone-salmeterol       Alopecia areata       * VENTOLIN  (90 BASE) MCG/ACT Inhaler   Generic drug:  albuterol       Alopecia areata       * albuterol (2.5 MG/3ML) 0.083% neb solution       Alopecia areata       gentamicin 0.1 % cream    GARAMYCIN      Alopecia areata       HYDROcodone-acetaminophen 5-325 MG per  tablet    NORCO      Alopecia areata       ketoconazole 2 % shampoo    NIZORAL    120 mL    Three times weekly: lather into scalp, leave in place for 3-5 minutes, then rinse.    Alopecia areata       montelukast 10 MG tablet    SINGULAIR      Alopecia areata       predniSONE 5 MG tablet    DELTASONE    124 tablet    Take 8 tablets for 5 days, 7 tablets for 3 days, 6 tablets for 3 days, 5 tablets for 3 days, 4 tablets for 3 days, 3 tablets for 3 days, 2 tablets for 3 days, then 1 tablet for 3 days.    Alopecia areata       simvastatin 80 MG tablet    ZOCOR      Alopecia areata       triamcinolone 0.1 % cream    KENALOG    80 g    Apply twice daily for ear rash as needed.    Dermatitis       triamcinolone acetonide 10 MG/ML injection    KENALOG    2 mL    Inject 1 mL (10 mg) into the skin once for 1 dose    Alopecia areata       triamterene 50 MG capsule    DYRENIUM      Alopecia areata       * Notice:  This list has 2 medication(s) that are the same as other medications prescribed for you. Read the directions carefully, and ask your doctor or other care provider to review them with you.

## 2018-03-22 NOTE — NURSING NOTE
Drug Administration Record    Drug Name: triamcinolone acetonide(kenalog)  Dose:1 mL  Route administered: ID  NDC #: Kenalog-10 (8597-9661-56)  Amount of waste(mL):4  Reason for waste: Single use vial    LOT #: [id number]  SITE: scalp  : [employer]  EXPIRATION DATE: 4/2019

## 2018-03-23 LAB
M TB TUBERC IFN-G BLD QL: NEGATIVE
M TB TUBERC IFN-G/MITOGEN IGNF BLD: 0 IU/ML

## 2018-03-26 DIAGNOSIS — L63.9 AA (ALOPECIA AREATA): ICD-10-CM

## 2018-04-15 PROBLEM — L30.9 DERMATITIS: Status: ACTIVE | Noted: 2018-04-15

## 2018-07-23 ENCOUNTER — MYC MEDICAL ADVICE (OUTPATIENT)
Dept: DERMATOLOGY | Facility: CLINIC | Age: 58
End: 2018-07-23

## 2018-07-23 DIAGNOSIS — Z79.899 ENCOUNTER FOR LONG-TERM (CURRENT) USE OF HIGH-RISK MEDICATION: Primary | ICD-10-CM

## 2018-07-23 DIAGNOSIS — L63.9 ALOPECIA AREATA: ICD-10-CM

## 2018-07-23 DIAGNOSIS — Z79.899 ENCOUNTER FOR LONG-TERM (CURRENT) USE OF HIGH-RISK MEDICATION: ICD-10-CM

## 2018-07-23 LAB
ALBUMIN SERPL-MCNC: 3.9 G/DL (ref 3.4–5)
ALBUMIN UR-MCNC: NEGATIVE MG/DL
ALP SERPL-CCNC: 121 U/L (ref 40–150)
ALT SERPL W P-5'-P-CCNC: 37 U/L (ref 0–50)
ANION GAP SERPL CALCULATED.3IONS-SCNC: 10 MMOL/L (ref 3–14)
APPEARANCE UR: CLEAR
AST SERPL W P-5'-P-CCNC: 32 U/L (ref 0–45)
BASOPHILS # BLD AUTO: 0.1 10E9/L (ref 0–0.2)
BASOPHILS NFR BLD AUTO: 0.7 %
BILIRUB SERPL-MCNC: 1 MG/DL (ref 0.2–1.3)
BILIRUB UR QL STRIP: NEGATIVE
BUN SERPL-MCNC: 20 MG/DL (ref 7–30)
CALCIUM SERPL-MCNC: 9.4 MG/DL (ref 8.5–10.1)
CHLORIDE SERPL-SCNC: 101 MMOL/L (ref 94–109)
CHOLEST SERPL-MCNC: 147 MG/DL
CHOLEST SERPL-MCNC: 150 MG/DL
CO2 SERPL-SCNC: 25 MMOL/L (ref 20–32)
COLOR UR AUTO: ABNORMAL
CREAT SERPL-MCNC: 0.8 MG/DL (ref 0.52–1.04)
DIFFERENTIAL METHOD BLD: NORMAL
EOSINOPHIL # BLD AUTO: 0.2 10E9/L (ref 0–0.7)
EOSINOPHIL NFR BLD AUTO: 1.7 %
ERYTHROCYTE [DISTWIDTH] IN BLOOD BY AUTOMATED COUNT: 12 % (ref 10–15)
GFR SERPL CREATININE-BSD FRML MDRD: 74 ML/MIN/1.7M2
GLUCOSE SERPL-MCNC: 94 MG/DL (ref 70–99)
GLUCOSE UR STRIP-MCNC: NEGATIVE MG/DL
HCT VFR BLD AUTO: 42 % (ref 35–47)
HDLC SERPL-MCNC: 66 MG/DL
HDLC SERPL-MCNC: 66 MG/DL
HGB BLD-MCNC: 14.1 G/DL (ref 11.7–15.7)
HGB UR QL STRIP: ABNORMAL
IMM GRANULOCYTES # BLD: 0 10E9/L (ref 0–0.4)
IMM GRANULOCYTES NFR BLD: 0.4 %
KETONES UR STRIP-MCNC: NEGATIVE MG/DL
LDLC SERPL CALC-MCNC: 56 MG/DL
LDLC SERPL CALC-MCNC: 59 MG/DL
LEUKOCYTE ESTERASE UR QL STRIP: ABNORMAL
LYMPHOCYTES # BLD AUTO: 4.4 10E9/L (ref 0.8–5.3)
LYMPHOCYTES NFR BLD AUTO: 39.9 %
MCH RBC QN AUTO: 30.5 PG (ref 26.5–33)
MCHC RBC AUTO-ENTMCNC: 33.6 G/DL (ref 31.5–36.5)
MCV RBC AUTO: 91 FL (ref 78–100)
MONOCYTES # BLD AUTO: 0.7 10E9/L (ref 0–1.3)
MONOCYTES NFR BLD AUTO: 6.5 %
MUCOUS THREADS #/AREA URNS LPF: PRESENT /LPF
NEUTROPHILS # BLD AUTO: 5.5 10E9/L (ref 1.6–8.3)
NEUTROPHILS NFR BLD AUTO: 50.8 %
NITRATE UR QL: NEGATIVE
NONHDLC SERPL-MCNC: 81 MG/DL
NONHDLC SERPL-MCNC: 85 MG/DL
NRBC # BLD AUTO: 0 10*3/UL
NRBC BLD AUTO-RTO: 0 /100
PH UR STRIP: 6 PH (ref 5–7)
PLATELET # BLD AUTO: 310 10E9/L (ref 150–450)
POTASSIUM SERPL-SCNC: 3.5 MMOL/L (ref 3.4–5.3)
PROT SERPL-MCNC: 7.8 G/DL (ref 6.8–8.8)
RBC # BLD AUTO: 4.62 10E12/L (ref 3.8–5.2)
RBC #/AREA URNS AUTO: <1 /HPF (ref 0–2)
SODIUM SERPL-SCNC: 136 MMOL/L (ref 133–144)
SOURCE: ABNORMAL
SP GR UR STRIP: 1 (ref 1–1.03)
TRIGL SERPL-MCNC: 124 MG/DL
TRIGL SERPL-MCNC: 129 MG/DL
UROBILINOGEN UR STRIP-MCNC: 0 MG/DL (ref 0–2)
WBC # BLD AUTO: 10.9 10E9/L (ref 4–11)
WBC #/AREA URNS AUTO: 4 /HPF (ref 0–5)

## 2018-07-24 DIAGNOSIS — L63.9 AA (ALOPECIA AREATA): ICD-10-CM

## 2018-07-31 DIAGNOSIS — L63.9 ALOPECIA AREATA: ICD-10-CM

## 2018-08-01 RX ORDER — KETOCONAZOLE 20 MG/ML
SHAMPOO TOPICAL
Qty: 120 ML | Refills: 11 | Status: SHIPPED | OUTPATIENT
Start: 2018-08-01 | End: 2019-08-29

## 2018-08-01 NOTE — TELEPHONE ENCOUNTER
ketoconazole (NIZORAL) 2 % shampoo  Last Written Prescription Date:  12/21/17  Last Fill Quantity: 120ml,   # refills: 11  Last Office Visit : 3/22/18  Future Office visit:  8/30/18

## 2018-08-30 ENCOUNTER — OFFICE VISIT (OUTPATIENT)
Dept: DERMATOLOGY | Facility: CLINIC | Age: 58
End: 2018-08-30
Payer: COMMERCIAL

## 2018-08-30 DIAGNOSIS — L63.9 AA (ALOPECIA AREATA): Primary | ICD-10-CM

## 2018-08-30 DIAGNOSIS — M19.90 INFLAMMATORY ARTHRITIS: ICD-10-CM

## 2018-08-30 ASSESSMENT — PAIN SCALES - GENERAL
PAINLEVEL: NO PAIN (0)
PAINLEVEL: NO PAIN (1)
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
## 2018-08-30 NOTE — MR AVS SNAPSHOT
After Visit Summary   8/30/2018    Eliane Lockhart    MRN: [id number]           Patient Information     Date Of Birth          1960        Visit Information        Provider Department      8/30/2018 7:00 AM Edmund Leonard MD [facility] Dermatology        Today's Diagnoses     AA (alopecia areata)    -  1    Inflammatory arthritis          Care Instructions    Continue on the xeljanz.   Continue previous topicals.   Call in with ongoing scalp folliculitis for clindamycin solution.   Follow-up in 3 months with safety labs at that point.     Preventive Care:    Colorectal Cancer Screening: During our visit today, we discussed that it is recommended you receive colorectal cancer screening. Please call or make an appointment with your primary care provider to discuss this. You may also call the  [employer] scheduling line ([phone]) to set up a colonoscopy appointment.              Follow-ups after your visit        Follow-up notes from your care team     Return in about 3 months (around 11/30/2018).      Who to contact     Please call your clinic at [phone] to:    Ask questions about your health    Make or cancel appointments    Discuss your medicines    Learn about your test results    Speak to your doctor            Additional Information About Your Visit        MyChart Information     [email or web address] gives you secure access to your electronic health record. If you see a primary care provider, you can also send messages to your care team and make appointments. If you have questions, please call your primary care clinic.  If you do not have a primary care provider, please call [phone] and they will assist you.      [email or web address] is an electronic gateway that provides easy, online access to your medical records. With [email or web address], you can request a clinic appointment, read your test results, renew a prescription or communicate with your care team.     To access your existing account, please contact  your [facility] or call [phone] for assistance.        Care EveryWhere ID     This is your Care EveryWhere ID. This could be used by other organizations to access your [place] medical records  [id number]         Blood Pressure from Last 3 Encounters:   03/22/18 104/71   12/21/17 127/80   06/29/17 117/82    Weight from Last 3 Encounters:   No data found for Wt              We Performed the Following     ABSTRACT COLOGUARD-NO CHARGE          Where to get your medicines      These medications were sent to [facility] - [place], [place] - [address]  [address], [facility] [address]     Phone:  [phone]     tofacitinib 5 MG tablet          Primary Care Provider Office Phone # Fax #    Madai Elizondo [phone]       [facility] [address]  [facility] [address]        Equal Access to Services     EDU ESPINO : Hadii aad ku hadasho Soomaali, waaxda luqadaha, qaybta kaalmada adeegyada, waxay idiin hayaan srinivaseg laurent bartlett . So [facility] [phone].    ATENCIÓN: Si habla español, tiene a griffith disposición servicios gratuitos de asistencia lingüística. Llame al [phone].    We comply with applicable federal civil rights laws and [place] laws. We do not discriminate on the basis of race, color, national origin, age, disability, sex, sexual orientation, or gender identity.            Thank you!     Thank you for choosing [facility] DERMATOLOGY  for your care. Our goal is always to provide you with excellent care. Hearing back from our patients is one way we can continue to improve our services. Please take a few minutes to complete the written survey that you may receive in the mail after your visit with us. Thank you!             Your Updated Medication List - Protect others around you: Learn how to safely use, store and throw away your medicines at www.disposemymeds.org.           This list is accurate as of 8/30/18  7:22 AM.  Always use your most recent med list.                   Brand Name Dispense Instructions for use Diagnosis    ADVAIR DISKUS 250-50 MCG/DOSE diskus inhaler   Generic drug:  fluticasone-salmeterol       Alopecia areata       * VENTOLIN  (90 Base) MCG/ACT inhaler   Generic drug:  albuterol       Alopecia areata       * albuterol (2.5 MG/3ML) 0.083% neb solution       Alopecia areata       gentamicin 0.1 % cream    GARAMYCIN      Alopecia areata       HYDROcodone-acetaminophen 5-325 MG per tablet    NORCO      Alopecia areata       ketoconazole 2 % shampoo    NIZORAL    120 mL    Three times weekly: lather into scalp, leave in place for 3-5 minutes, then rinse.    Alopecia areata       montelukast 10 MG tablet    SINGULAIR      Alopecia areata       predniSONE 5 MG tablet    DELTASONE    124 tablet    Take 8 tablets for 5 days, 7 tablets for 3 days, 6 tablets for 3 days, 5 tablets for 3 days, 4 tablets for 3 days, 3 tablets for 3 days, 2 tablets for 3 days, then 1 tablet for 3 days.    Alopecia areata       simvastatin 80 MG tablet    ZOCOR      Alopecia areata       tofacitinib 5 MG tablet    XELJANZ    60 tablet    Take 1 tablet (5 mg) by mouth 2 times daily    AA (alopecia areata)       triamcinolone 0.1 % cream    KENALOG    80 g    Apply twice daily for ear rash as needed.    Dermatitis       triamterene 50 MG capsule    DYRENIUM      Alopecia areata       * Notice:  This list has 2 medication(s) that are the same as other medications prescribed for you. Read the directions carefully, and ask your doctor or other care provider to review them with you.

## 2018-08-30 NOTE — PROGRESS NOTES
I talked with and examined Eliane Lockhart and I agree with the assessment and the plan. I was present for the scalp injections.  MIROSLAVA Mancilla MD.

## 2018-08-30 NOTE — NURSING NOTE
Drug Administration Record    Drug Name: triamcinolone acetonide(kenalog)  Dose: 2mL of triamcinolone 10mg/mL, 20mg dose  Route administered: ID  NDC #: Kenalog-10 (5334-2239-28)  Amount of waste(mL):3  Reason for waste: Single use vial    LOT #: [id number]  SITE: [facility]   : [employer]  EXPIRATION DATE: 2/2020
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
## 2018-08-30 NOTE — PATIENT INSTRUCTIONS
Continue on the xeljanz.   Continue previous topicals.   Call in with ongoing scalp folliculitis for clindamycin solution.   Follow-up in 3 months with safety labs at that point.     Preventive Care:    Colorectal Cancer Screening: During our visit today, we discussed that it is recommended you receive colorectal cancer screening. Please call or make an appointment with your primary care provider to discuss this. You may also call the [employer] scheduling line ([phone]) to set up a colonoscopy appointment.

## 2018-08-30 NOTE — NURSING NOTE
Dermatology Rooming Note    Eliane Lockhart's goals for this visit include:   Chief Complaint   Patient presents with     Hair Loss     Alopecia Areata. Currently on XELJANZ. Eliane notes that she is starting to loose hair again.     Opal Bennett, CMA

## 2018-10-01 ENCOUNTER — TELEPHONE (OUTPATIENT)
Dept: RHEUMATOLOGY | Facility: CLINIC | Age: 58
End: 2018-10-01

## 2018-10-01 NOTE — TELEPHONE ENCOUNTER
[facility] Call Center    Phone Message    May a detailed message be left on voicemail: no    Reason for Call: Other: Pt was referred by Dr. Siddiqi to Rheumatology, pt is calling to get set up     Action Taken: Message routed to:  [facility] ([facility] Rheumatology

## 2018-10-04 ENCOUNTER — OFFICE VISIT (OUTPATIENT)
Dept: DERMATOLOGY | Facility: CLINIC | Age: 58
End: 2018-10-04
Payer: COMMERCIAL

## 2018-10-04 DIAGNOSIS — M08.00 JRA (JUVENILE RHEUMATOID ARTHRITIS) (H): ICD-10-CM

## 2018-10-04 DIAGNOSIS — L63.9 ALOPECIA AREATA: ICD-10-CM

## 2018-10-04 DIAGNOSIS — L63.9 AA (ALOPECIA AREATA): Primary | ICD-10-CM

## 2018-10-04 LAB
ALBUMIN SERPL-MCNC: 3.5 G/DL (ref 3.4–5)
ALBUMIN UR-MCNC: NEGATIVE MG/DL
ALP SERPL-CCNC: 103 U/L (ref 40–150)
ALT SERPL W P-5'-P-CCNC: 43 U/L (ref 0–50)
ANION GAP SERPL CALCULATED.3IONS-SCNC: 6 MMOL/L (ref 3–14)
APPEARANCE UR: CLEAR
AST SERPL W P-5'-P-CCNC: 25 U/L (ref 0–45)
BASOPHILS # BLD AUTO: 0.1 10E9/L (ref 0–0.2)
BASOPHILS NFR BLD AUTO: 0.8 %
BILIRUB SERPL-MCNC: 1.3 MG/DL (ref 0.2–1.3)
BILIRUB UR QL STRIP: NEGATIVE
BUN SERPL-MCNC: 19 MG/DL (ref 7–30)
CALCIUM SERPL-MCNC: 8.8 MG/DL (ref 8.5–10.1)
CHLORIDE SERPL-SCNC: 106 MMOL/L (ref 94–109)
CHOLEST SERPL-MCNC: 186 MG/DL
CO2 SERPL-SCNC: 29 MMOL/L (ref 20–32)
COLOR UR AUTO: YELLOW
CREAT SERPL-MCNC: 0.93 MG/DL (ref 0.52–1.04)
DIFFERENTIAL METHOD BLD: NORMAL
EOSINOPHIL # BLD AUTO: 0.1 10E9/L (ref 0–0.7)
EOSINOPHIL NFR BLD AUTO: 1.7 %
ERYTHROCYTE [DISTWIDTH] IN BLOOD BY AUTOMATED COUNT: 13 % (ref 10–15)
GFR SERPL CREATININE-BSD FRML MDRD: 62 ML/MIN/1.7M2
GLUCOSE SERPL-MCNC: 94 MG/DL (ref 70–99)
GLUCOSE UR STRIP-MCNC: NEGATIVE MG/DL
HCT VFR BLD AUTO: 41.4 % (ref 35–47)
HDLC SERPL-MCNC: 81 MG/DL
HGB BLD-MCNC: 13.8 G/DL (ref 11.7–15.7)
HGB UR QL STRIP: NEGATIVE
IMM GRANULOCYTES # BLD: 0 10E9/L (ref 0–0.4)
IMM GRANULOCYTES NFR BLD: 0.3 %
KETONES UR STRIP-MCNC: NEGATIVE MG/DL
LDLC SERPL CALC-MCNC: 81 MG/DL
LEUKOCYTE ESTERASE UR QL STRIP: NEGATIVE
LYMPHOCYTES # BLD AUTO: 2.4 10E9/L (ref 0.8–5.3)
LYMPHOCYTES NFR BLD AUTO: 32.4 %
MCH RBC QN AUTO: 31.1 PG (ref 26.5–33)
MCHC RBC AUTO-ENTMCNC: 33.3 G/DL (ref 31.5–36.5)
MCV RBC AUTO: 93 FL (ref 78–100)
MONOCYTES # BLD AUTO: 0.5 10E9/L (ref 0–1.3)
MONOCYTES NFR BLD AUTO: 7.1 %
NEUTROPHILS # BLD AUTO: 4.3 10E9/L (ref 1.6–8.3)
NEUTROPHILS NFR BLD AUTO: 57.7 %
NITRATE UR QL: NEGATIVE
NONHDLC SERPL-MCNC: 104 MG/DL
NRBC # BLD AUTO: 0 10*3/UL
NRBC BLD AUTO-RTO: 0 /100
PH UR STRIP: 7 PH (ref 5–7)
PLATELET # BLD AUTO: 293 10E9/L (ref 150–450)
POTASSIUM SERPL-SCNC: 4.1 MMOL/L (ref 3.4–5.3)
PROT SERPL-MCNC: 7.2 G/DL (ref 6.8–8.8)
RBC # BLD AUTO: 4.44 10E12/L (ref 3.8–5.2)
RBC #/AREA URNS AUTO: 1 /HPF (ref 0–2)
SODIUM SERPL-SCNC: 141 MMOL/L (ref 133–144)
SOURCE: NORMAL
SP GR UR STRIP: 1.01 (ref 1–1.03)
TRIGL SERPL-MCNC: 115 MG/DL
UROBILINOGEN UR STRIP-MCNC: 0 MG/DL (ref 0–2)
WBC # BLD AUTO: 7.5 10E9/L (ref 4–11)
WBC #/AREA URNS AUTO: 1 /HPF (ref 0–5)

## 2018-10-04 ASSESSMENT — PAIN SCALES - GENERAL
PAINLEVEL: NO PAIN (0)
PAINLEVEL: NO PAIN (1)

## 2018-10-04 NOTE — MR AVS SNAPSHOT
After Visit Summary   10/4/2018    Eliane Lockhart    MRN: [id number]           Patient Information     Date Of Birth          1960        Visit Information        Provider Department      10/4/2018 8:00 AM Edmund Leonard MD [facility] Dermatology        Today's Diagnoses     AA (alopecia areata)    -  1    JRA (juvenile rheumatoid arthritis) (H)          Care Instructions    Repeat injections today.   Labs; if normal will plan to continue on the Xeljanz 5mg twice daily .  Followup in 3 months.           Follow-ups after your visit        Follow-up notes from your care team     Return in about 3 months (around 1/4/2019).      Your next 10 appointments already scheduled     Willian 10, 2019  7:00 AM CST   (Arrive by 6:45 AM)   Return Visit with Edmund Leonard MD   [facility] Dermatology ([facility])    [address] [place] [id number] [phone]              Who to contact     Please call your clinic at [phone] to:    Ask questions about your health    Make or cancel appointments    Discuss your medicines    Learn about your test results    Speak to your doctor            Additional Information About Your Visit        MyChart Information     [employer] gives you secure access to your electronic health record. If you see a primary care provider, you can also send messages to your care team and make appointments. If you have questions, please call your primary care clinic.  If you do not have a primary care provider, please call [phone] and they will assist you.      [employer] is an electronic gateway that provides easy, online access to your medical records. With [employer], you can request a clinic appointment, read your test results, renew a prescription or communicate with your care team.     To access your existing account, please contact your [facility] or call [phone] for assistance.         Care EveryWhere ID     This is your Care EveryWhere ID. This could be used by other organizations to access your [place] medical records  [id number]         Blood Pressure from Last 3 Encounters:   03/22/18 104/71   12/21/17 127/80   06/29/17 117/82    Weight from Last 3 Encounters:   No data found for Wt              We Performed the Following     INJECTION INTO SKIN LESIONS >7          Today's Medication Changes          These changes are accurate as of 10/4/18  8:33 AM.  If you have any questions, ask your nurse or doctor.               Start taking these medicines.        Dose/Directions    triamcinolone acetonide 10 MG/ML injection   Commonly known as:  KENALOG   Used for:  AA (alopecia areata)   Started by:  Edmund Leonard MD        Dose:  30 mg   Inject 3 mLs (30 mg) into the skin once for 1 dose   Quantity:  3 mL   Refills:  0            Where to get your medicines      Some of these will need a paper prescription and others can be bought over the counter.  Ask your nurse if you have questions.     You don't need a prescription for these medications     triamcinolone acetonide 10 MG/ML injection                Primary Care Provider Office Phone # Fax #    Madai Elizondo [phone]       [facility] [address] [facility] [address]        Equal Access to Services     EDU ESPINO AH: Hadii ganesh ku hadasho Soomaali, waaxda luqadaha, qaybta kaalmada adeegyada, waxay stu ness. So [facility] [phone].    ATENCIÓN: Si habla español, tiene a griffith disposición servicios gratuitos de asistencia lingüística. Llame al [phone].    We comply with applicable federal civil rights laws and [place] laws. We do not discriminate on the basis of race, color, national origin, age, disability, sex, sexual orientation, or gender identity.            Thank you!     Thank you for choosing [facility] DERMATOLOGY  for your care. Our goal is always to  provide you with excellent care. Hearing back from our patients is one way we can continue to improve our services. Please take a few minutes to complete the written survey that you may receive in the mail after your visit with us. Thank you!             Your Updated Medication List - Protect others around you: Learn how to safely use, store and throw away your medicines at www.disposemymeds.org.          This list is accurate as of 10/4/18  8:33 AM.  Always use your most recent med list.                   Brand Name Dispense Instructions for use Diagnosis    ADVAIR DISKUS 250-50 MCG/DOSE diskus inhaler   Generic drug:  fluticasone-salmeterol       Alopecia areata       * VENTOLIN  (90 Base) MCG/ACT inhaler   Generic drug:  albuterol       Alopecia areata       * albuterol (2.5 MG/3ML) 0.083% neb solution       Alopecia areata       gentamicin 0.1 % cream    GARAMYCIN      Alopecia areata       HYDROcodone-acetaminophen 5-325 MG per tablet    NORCO      Alopecia areata       ketoconazole 2 % shampoo    NIZORAL    120 mL    Three times weekly: lather into scalp, leave in place for 3-5 minutes, then rinse.    Alopecia areata       montelukast 10 MG tablet    SINGULAIR      Alopecia areata       predniSONE 5 MG tablet    DELTASONE    124 tablet    Take 8 tablets for 5 days, 7 tablets for 3 days, 6 tablets for 3 days, 5 tablets for 3 days, 4 tablets for 3 days, 3 tablets for 3 days, 2 tablets for 3 days, then 1 tablet for 3 days.    Alopecia areata       simvastatin 80 MG tablet    ZOCOR      Alopecia areata       tofacitinib 5 MG tablet    XELJANZ    60 tablet    Take 1 tablet (5 mg) by mouth 2 times daily    AA (alopecia areata)       triamcinolone 0.1 % cream    KENALOG    80 g    Apply twice daily for ear rash as needed.    Dermatitis       triamcinolone acetonide 10 MG/ML injection    KENALOG    3 mL    Inject 3 mLs (30 mg) into the skin once for 1 dose    AA (alopecia areata)       triamterene 50 MG capsule     DYRENIUM      Alopecia areata       * Notice:  This list has 2 medication(s) that are the same as other medications prescribed for you. Read the directions carefully, and ask your doctor or other care provider to review them with you.

## 2018-10-04 NOTE — NURSING NOTE
"Chief Complaint   Patient presents with     Derm Problem     Eliane is here today for hairloss. She states \" my hair is not falling out at much, but I do have more noticable areas on the top of my scalp\"     Jayda Arora, RMLIANNE  "

## 2018-10-04 NOTE — PROGRESS NOTES
"[facility] Dermatology Note      Dermatology Problem List:   1.  Alopecia areata, rapidly progressive, onset 10/2010 with scalp folliculitis.  Currently on oral tofacitinib.  Repeat ILK 12/21/2017 and 3/22/2018 .   2.  ROSARIO/JRA, improved with above. Ref to rheum 3/22/2018.   3.  Mild dermatitis, ears, triamcinolone cream.       Encounter Date:  10/4/2018      CC: Patient presents with:  Derm Problem: Eliane is here today for hairloss. She states \" my hair is not falling out at much, but I do have more noticable areas on the top of my scalp\"         History of Present Illness:   Eliane Lockhart is a pleasant, 58-year-old woman who presents today in followup for alopecia areata and ROSARIO/JRA.  She was last seen 8/30/18 when she was continued on tofacitinib at 5mg BID dosage after a short lapse in treatment that began in the spring. She notes that things went well off of the medication until mid to late July when she started to have recurrence of her hairloss process with hair falling out in clumps. Restarted on xeljanz via [employer]. Continues on this medication today without any issues or side effects. Since the last visit she notes improved regrowth; notably grey hairs anterior. Denies ongoing shedding. Joint symptoms improved on medication, though with slight hair with change in season. Did well with ILK at the last visit.      She denies significant infectious symptoms, including fevers, chills, sweats, unexplained weight loss, new lumps or bumps, respiratory symptoms, GI symptoms, among others.  The patient notes her joints are significantly improved on this medication.  Awaiting Rheum appointment. Planning screening colonoscopy.       Safety labs reassuring as of 7/23/18.        Past Medical History:    JRA/ROSARIO, treated with methotrexate in the distant past, now with NSAIDs and improved with tofacitinib as above, obesity, hyperlipidemia, asthma requiring multiple inhalers and intermittent " prednisone.      Family History:   Daughter with atopic dermatitis; otherwise, no chronic skin or autoimmune conditions.        Current Outpatient Prescriptions   Medication     albuterol (2.5 MG/3ML) 0.083% neb solution     albuterol (VENTOLIN HFA) 108 (90 BASE) MCG/ACT Inhaler     fluticasone-salmeterol (ADVAIR DISKUS) 250-50 MCG/DOSE diskus inhaler     gentamicin (GARAMYCIN) 0.1 % cream     ketoconazole (NIZORAL) 2 % shampoo     montelukast (SINGULAIR) 10 MG tablet     simvastatin (ZOCOR) 80 MG tablet     tofacitinib (XELJANZ) 5 MG tablet     triamcinolone (KENALOG) 0.1 % cream     triamcinolone acetonide (KENALOG) 10 MG/ML injection     triamterene (DYRENIUM) 50 MG capsule     HYDROcodone-acetaminophen (NORCO) 5-325 MG per tablet     predniSONE (DELTASONE) 5 MG tablet     No current facility-administered medications for this visit.         Allergies   Allergen Reactions     Seasonal Allergies Other (See Comments)     Runny nose, eyes, difficulty breathing         Review of Systems:   A 10 point review of systems was negative beyond that stated above in the HPI.       Physical exam:   There were no vitals taken for this visit.   GENERAL:  A well-appearing  female appearing her stated age, affect normal, cooperative with the exam, alert and oriented x3.     SKIN:  A focused examination of the scalp, face, neck, bilateral hands, nails and back is performed.   Scattered round patches of non-scarring alopecia overlying the entire scalp. Approximately 10 patches in number. Improved terminal regrowth; Grey anteriorly, largely brown posteriorly. Negative hairpull test. Mild early regrowth. No other lesions noted today.  See photos.         Impression/Plan:   1.  Alopecia areata, rapidly progressive with secondary folliculitis, now much improved.  History of JRA/ROSARIO. Both conditions significantly improved on Tofacitinib, which she has been on since approximately 05/2017. Patient stopped this medication in late  spring and did well for several weeks, but ultimately AA process restarted with significant loss of hair (joint sx worsening as well).  Restarted xeljanz 5mg BID in August, which she has tolerated this well without any significant side effects.  Again, we counseled her on the risks and benefits of the treatment approaches. Reviewed the importance of reestablishing care with rheumatology. After discussion, the patient would be apt to continue on the medication. Plan today:   - Continue Xeljanz 5 mg b.i.d.  Pending safety labs today (CBC, CMP, lipid panel, UA). Standing orders placed previously.   - Safety labs to be repeated at f/u visit in 3 months   - Continue with ketoconazole shampoo approximately 2-3 times weekly to the scalp.   - Continue minoxidil 5% foam, double dose once daily to the affected scalp.  - Intralesional Kenalog injections were performed today.  Approximately 40 sites were infiltrated with Kenalog 10 mg/mL.  Approximately 3.0 mL of Kenalog was utilized today, and this was well tolerated.     -Patient in process of scheduling appointment with rheumatology to reestablish care at the [facility]. Appreciate assistance with determining treatment course/duration going forward.   -  All questions were answered to her apparent satisfaction.       2.  Mild dermatitis on external pinnae bilaterally; Resolved previously.   -Continue PRN triamcinolone 0.1% cream b.i.d. p.r.n. With reoccurrence.      The patient was seen and discussed with Dr. Jayda Siddiqi MD who was staff for this encounter.      Follow up in 3 months, or sooner PRN.     Resident:  MD Edmund Erwin MD  PGY3 Dermatology  [phone]        Patient was seen and examined with the dermatology resident. I agree with the history, review of systems, physical examination, assessments and plan. I was present for the key portion of the ILK injections.    Jayda Siddiqi MD  Professor and  Chair  Department of Dermatology  [employer]  [facility]

## 2018-10-04 NOTE — PATIENT INSTRUCTIONS
Repeat injections today.   Labs; if normal will plan to continue on the Xeljanz 5mg twice daily .  Followup in 3 months.

## 2018-10-04 NOTE — LETTER
"10/4/2018       RE: Eliane Lockhart  [address]  [place] [facility] [place] [address]     Dear Colleague,    Thank you for referring your patient, Eliane Lockhart, to the [facility] DERMATOLOGY at [facility]. Please see a copy of my visit note below.    [facility] Dermatology Note      Dermatology Problem List:   1.  Alopecia areata, rapidly progressive, onset 10/2010 with scalp folliculitis.  Currently on oral tofacitinib.  Repeat ILK 12/21/2017 and 3/22/2018 .   2.  ROSARIO/JRA, improved with above. Ref to rheum 3/22/2018.   3.  Mild dermatitis, ears, triamcinolone cream.       Encounter Date:  10/4/2018      CC: Patient presents with:  Derm Problem: Eliane is here today for hairloss. She states \" my hair is not falling out at much, but I do have more noticable areas on the top of my scalp\"         History of Present Illness:   Eliane Lockhart is a pleasant, 58-year-old woman who presents today in followup for alopecia areata and ROSARIO/JRA.  She was last seen 8/30/18 when she was continued on tofacitinib at 5mg BID dosage after a short lapse in treatment that began in the spring. She notes that things went well off of the medication until mid to late July when she started to have recurrence of her hairloss process with hair falling out in clumps. Restarted on xeljanz via [employer]. Continues on this medication today without any issues or side effects. Since the last visit she notes improved regrowth; notably grey hairs anterior. Denies ongoing shedding. Joint symptoms improved on medication, though with slight hair with change in season. Did well with ILK at the last visit.      She denies significant infectious symptoms, including fevers, chills, sweats, unexplained weight loss, new lumps or bumps, respiratory symptoms, GI symptoms, among others.  The patient notes her joints are significantly improved on this medication.  Awaiting Rheum appointment. Planning " screening colonoscopy.       Safety labs reassuring as of 7/23/18.        Past Medical History:    JRA/ROSARIO, treated with methotrexate in the distant past, now with NSAIDs and improved with tofacitinib as above, obesity, hyperlipidemia, asthma requiring multiple inhalers and intermittent prednisone.      Family History:   Daughter with atopic dermatitis; otherwise, no chronic skin or autoimmune conditions.        Current Outpatient Prescriptions   Medication     albuterol (2.5 MG/3ML) 0.083% neb solution     albuterol (VENTOLIN HFA) 108 (90 BASE) MCG/ACT Inhaler     fluticasone-salmeterol (ADVAIR DISKUS) 250-50 MCG/DOSE diskus inhaler     gentamicin (GARAMYCIN) 0.1 % cream     ketoconazole (NIZORAL) 2 % shampoo     montelukast (SINGULAIR) 10 MG tablet     simvastatin (ZOCOR) 80 MG tablet     tofacitinib (XELJANZ) 5 MG tablet     triamcinolone (KENALOG) 0.1 % cream     triamcinolone acetonide (KENALOG) 10 MG/ML injection     triamterene (DYRENIUM) 50 MG capsule     HYDROcodone-acetaminophen (NORCO) 5-325 MG per tablet     predniSONE (DELTASONE) 5 MG tablet     No current facility-administered medications for this visit.         Allergies   Allergen Reactions     Seasonal Allergies Other (See Comments)     Runny nose, eyes, difficulty breathing         Review of Systems:   A 10 point review of systems was negative beyond that stated above in the HPI.       Physical exam:   There were no vitals taken for this visit.   GENERAL:  A well-appearing  female appearing her stated age, affect normal, cooperative with the exam, alert and oriented x3.     SKIN:  A focused examination of the scalp, face, neck, bilateral hands, nails and back is performed.   Scattered round patches of non-scarring alopecia overlying the entire scalp. Approximately 10 patches in number. Improved terminal regrowth; Grey anteriorly, largely brown posteriorly. Negative hairpull test. Mild early regrowth. No other lesions noted today.  See  photos.         Impression/Plan:   1.  Alopecia areata, rapidly progressive with secondary folliculitis, now much improved.  History of JRA/ROSARIO. Both conditions significantly improved on Tofacitinib, which she has been on since approximately 05/2017. Patient stopped this medication in late spring and did well for several weeks, but ultimately AA process restarted with significant loss of hair (joint sx worsening as well).  Restarted xeljanz 5mg BID in August, which she has tolerated this well without any significant side effects.  Again, we counseled her on the risks and benefits of the treatment approaches. Reviewed the importance of reestablishing care with rheumatology. After discussion, the patient would be apt to continue on the medication. Plan today:   - Continue Xeljanz 5 mg b.i.d.  Pending safety labs today (CBC, CMP, lipid panel, UA). Standing orders placed previously.   - Safety labs to be repeated at f/u visit in 3 months   - Continue with ketoconazole shampoo approximately 2-3 times weekly to the scalp.   - Continue minoxidil 5% foam, double dose once daily to the affected scalp.  - Intralesional Kenalog injections were performed today.  Approximately 40 sites were infiltrated with Kenalog 10 mg/mL.  Approximately 3.0 mL of Kenalog was utilized today, and this was well tolerated.     -Patient in process of scheduling appointment with rheumatology to reestablish care at the [facility]. Appreciate assistance with determining treatment course/duration going forward.   -  All questions were answered to her apparent satisfaction.       2.  Mild dermatitis on external pinnae bilaterally; Resolved previously.   -Continue PRN triamcinolone 0.1% cream b.i.d. p.r.n. With reoccurrence.      The patient was seen and discussed with Dr. Jayda Siddiqi MD who was staff for this encounter.      Follow up in 3 months, or sooner PRN.     Resident:  MD Edmund Erwin MD  PGY3 Dermatology  [phone]                             Pictures were placed in Pt's chart today for future reference.

## 2018-10-04 NOTE — NURSING NOTE
Drug Administration Record    Drug Name: triamcinolone acetonide(kenalog)  Dose: 3mL of triamcinolone 10mg/mL, 30mg dose  Route administered: ID  NDC #: Kenalog-10 (3034-6122-14)  Amount of waste(mL):2mL  Reason for waste: Single use vial    LOT #: [id number]  SITE: [facility]  : [employer]  EXPIRATION DATE: 2/2020

## 2018-10-09 ENCOUNTER — TELEPHONE (OUTPATIENT)
Dept: DERMATOLOGY | Facility: CLINIC | Age: 58
End: 2018-10-09

## 2018-10-09 NOTE — TELEPHONE ENCOUNTER
Rec'd faxed request from [employer] program to submit renewal materials to continue pt's free drug coverage through 2019.      Letter written and su started for pt.       Page 3 left for provider to sign.

## 2018-10-12 DIAGNOSIS — L63.9 AA (ALOPECIA AREATA): ICD-10-CM

## 2018-10-17 NOTE — TELEPHONE ENCOUNTER
Call was placed to patient regarding the referral we received for ROSARIO from Dr. Siddiqi, however, there was no answer. Message was left asking patient to call the clinic back to complete an intake form. Awaiting a call back from the patient.  Gwendolyn Daily CMA  10/17/2018 1:53 PM

## 2018-10-29 NOTE — TELEPHONE ENCOUNTER
Pt requesting add'l info to complete her form - provided BIN ([id number]) and PCN ([id number]) for her [facility]. Acknowledged that I sent in MD portion last week.

## 2018-10-31 NOTE — TELEPHONE ENCOUNTER
Final attempt to complete the New Patient Intake process. Unfortunately, we are not able to move forward until the form is completed. Closing the referral request until we hear from the patient.   Gwendolyn Daily CMA  10/31/2018 12:53 PM

## 2018-11-09 NOTE — TELEPHONE ENCOUNTER
[facility] Call Center    Phone Message    May a detailed message be left on voicemail: yes    Reason for Call: Other: The pt is calling for Gwendolyn again. Please call her Home if calling today. Any other day, call the cell or work only. The pt is extremely frustrated with this process. Thanks.     Action Taken: Message routed to:  [facility]

## 2018-11-12 NOTE — TELEPHONE ENCOUNTER
General Rheumatology Intake Form    Reason for referral: ROSARIO  Referring provider: Dr. Siddiqi    Past Rheumatologist: Yes  Clinic/Provider name: [place] [facility] Last seen: 10 years ago - insurance changed and patient decided to try to manage her symptoms on her own.    Is this a second opinion or transfer of care? Re establish care with a Rheumatologist      Have you been diagnosed with Fibromyalgia? no    Who manages your care for this issue now? none     What is your most urgent concern at this time? Patient would like to re establish with a Rheumatologist to help manage her symptoms    Have you seen any specialist related to the reason you are coming here? Yes, see above    Where are we expecting records (labs, imaging or pathology) from? [facility], [place] emailed to [email or web address]    Offered an appointment on 1/30/2018 with Dr. Black, patient accepted and was instructed to arrive 15 minutes prior to appointment and asked to bring a current medication list. Patient verbalized understanding. Appointment reminder mailed to patient.         Gwendolyn Daily CMA  11/12/2018 3:10 PM

## 2019-01-10 ENCOUNTER — OFFICE VISIT (OUTPATIENT)
Dept: DERMATOLOGY | Facility: CLINIC | Age: 59
End: 2019-01-10
Payer: COMMERCIAL

## 2019-01-10 VITALS — DIASTOLIC BLOOD PRESSURE: 79 MMHG | SYSTOLIC BLOOD PRESSURE: 117 MMHG | HEART RATE: 72 BPM

## 2019-01-10 DIAGNOSIS — L63.9 ALOPECIA AREATA: ICD-10-CM

## 2019-01-10 DIAGNOSIS — M08.90 JUVENILE ARTHRITIS (H): ICD-10-CM

## 2019-01-10 DIAGNOSIS — L63.9 ALOPECIA AREATA: Primary | ICD-10-CM

## 2019-01-10 LAB
ALBUMIN SERPL-MCNC: 3.9 G/DL (ref 3.4–5)
ALBUMIN UR-MCNC: NEGATIVE MG/DL
ALP SERPL-CCNC: 99 U/L (ref 40–150)
ALT SERPL W P-5'-P-CCNC: 40 U/L (ref 0–50)
ANION GAP SERPL CALCULATED.3IONS-SCNC: 4 MMOL/L (ref 3–14)
APPEARANCE UR: ABNORMAL
AST SERPL W P-5'-P-CCNC: 25 U/L (ref 0–45)
BASOPHILS # BLD AUTO: 0.1 10E9/L (ref 0–0.2)
BASOPHILS NFR BLD AUTO: 0.9 %
BILIRUB SERPL-MCNC: 0.9 MG/DL (ref 0.2–1.3)
BILIRUB UR QL STRIP: NEGATIVE
BUN SERPL-MCNC: 23 MG/DL (ref 7–30)
CALCIUM SERPL-MCNC: 9 MG/DL (ref 8.5–10.1)
CHLORIDE SERPL-SCNC: 104 MMOL/L (ref 94–109)
CHOLEST SERPL-MCNC: 189 MG/DL
CO2 SERPL-SCNC: 29 MMOL/L (ref 20–32)
COLOR UR AUTO: ABNORMAL
CREAT SERPL-MCNC: 0.8 MG/DL (ref 0.52–1.04)
DIFFERENTIAL METHOD BLD: NORMAL
EOSINOPHIL # BLD AUTO: 0.3 10E9/L (ref 0–0.7)
EOSINOPHIL NFR BLD AUTO: 3.4 %
ERYTHROCYTE [DISTWIDTH] IN BLOOD BY AUTOMATED COUNT: 12.5 % (ref 10–15)
GFR SERPL CREATININE-BSD FRML MDRD: 81 ML/MIN/{1.73_M2}
GLUCOSE SERPL-MCNC: 90 MG/DL (ref 70–99)
GLUCOSE UR STRIP-MCNC: NEGATIVE MG/DL
HCT VFR BLD AUTO: 43.3 % (ref 35–47)
HDLC SERPL-MCNC: 84 MG/DL
HGB BLD-MCNC: 14.4 G/DL (ref 11.7–15.7)
HGB UR QL STRIP: NEGATIVE
IMM GRANULOCYTES # BLD: 0 10E9/L (ref 0–0.4)
IMM GRANULOCYTES NFR BLD: 0.4 %
KETONES UR STRIP-MCNC: NEGATIVE MG/DL
LDLC SERPL CALC-MCNC: 86 MG/DL
LEUKOCYTE ESTERASE UR QL STRIP: ABNORMAL
LYMPHOCYTES # BLD AUTO: 2.8 10E9/L (ref 0.8–5.3)
LYMPHOCYTES NFR BLD AUTO: 34.3 %
MCH RBC QN AUTO: 31.8 PG (ref 26.5–33)
MCHC RBC AUTO-ENTMCNC: 33.3 G/DL (ref 31.5–36.5)
MCV RBC AUTO: 96 FL (ref 78–100)
MONOCYTES # BLD AUTO: 0.5 10E9/L (ref 0–1.3)
MONOCYTES NFR BLD AUTO: 6.6 %
MUCOUS THREADS #/AREA URNS LPF: PRESENT /LPF
NEUTROPHILS # BLD AUTO: 4.4 10E9/L (ref 1.6–8.3)
NEUTROPHILS NFR BLD AUTO: 54.4 %
NITRATE UR QL: NEGATIVE
NONHDLC SERPL-MCNC: 105 MG/DL
NRBC # BLD AUTO: 0 10*3/UL
NRBC BLD AUTO-RTO: 0 /100
PH UR STRIP: 8 PH (ref 5–7)
PLATELET # BLD AUTO: 303 10E9/L (ref 150–450)
POTASSIUM SERPL-SCNC: 3.8 MMOL/L (ref 3.4–5.3)
PROT SERPL-MCNC: 7.5 G/DL (ref 6.8–8.8)
RBC # BLD AUTO: 4.53 10E12/L (ref 3.8–5.2)
RBC #/AREA URNS AUTO: 1 /HPF (ref 0–2)
SODIUM SERPL-SCNC: 138 MMOL/L (ref 133–144)
SOURCE: ABNORMAL
SP GR UR STRIP: 1.01 (ref 1–1.03)
SQUAMOUS #/AREA URNS AUTO: 1 /HPF (ref 0–1)
TRANS CELLS #/AREA URNS HPF: <1 /HPF
TRIGL SERPL-MCNC: 98 MG/DL
UROBILINOGEN UR STRIP-MCNC: 0 MG/DL (ref 0–2)
WBC # BLD AUTO: 8 10E9/L (ref 4–11)
WBC #/AREA URNS AUTO: 1 /HPF (ref 0–5)

## 2019-01-10 ASSESSMENT — PAIN SCALES - GENERAL: PAINLEVEL: NO PAIN (0)

## 2019-01-10 NOTE — PROGRESS NOTES
[facility] Dermatology Note      Dermatology Problem List:   1.  Alopecia areata, rapidly progressive, onset 10/2010 with scalp folliculitis. Currently on oral tofacitinib.  Repeat ILK 12/21/2017 and 3/22/2018 .   2.  ROSARIO/JRA, improved with above. Ref to rheum 3/22/2018.   3.  Mild dermatitis, ears, triamcinolone cream.       Encounter Date:  1/10/2019      CC: Patient presents with:  Derm Problem: Hairloss f/u Eliane states she has had some hair growth since her last visit.          History of Present Illness:   Eliane Lockhart is a pleasant, 58-year-old woman who presents today in followup for alopecia areata and ROSARIO/JRA.  She was last seen 10/4/2018.  At the last visit she received 3 ml ILK 10. She tolerated this well and notes signfiicant regrowth at the previously treated site since the last visit. Additionally, she was continued on tofacitinib at 5 mg BID. Continues on this medication today without any issues or side effects. Since the last visit she notes improved regrowth; notably grey hairs anterior. Facial (eyebrow/lash) hair has regrown well. Now needing to shave the legs again. Denies ongoing shedding. Joint symptoms significantly improved on medication.      She denies significant infectious symptoms, including fevers, chills, sweats, unexplained weight loss, new lumps or bumps, respiratory symptoms, GI symptoms, among others.  The patient notes her joints are significantly improved on this medication.  Awaiting Rheum appointment; has scheduled rheum appt 1/30/19 with Dr. Pardo.    Safety labs pending today.        Past Medical History:    JRA/ROSARIO, treated with methotrexate in the distant past, now with NSAIDs and improved with tofacitinib as above, obesity, hyperlipidemia, asthma requiring multiple inhalers and intermittent prednisone.      Family History:   Daughter with atopic dermatitis; otherwise, no chronic skin or autoimmune conditions.        Current Outpatient  Medications   Medication     albuterol (2.5 MG/3ML) 0.083% neb solution     albuterol (VENTOLIN HFA) 108 (90 BASE) MCG/ACT Inhaler     fluticasone-salmeterol (ADVAIR DISKUS) 250-50 MCG/DOSE diskus inhaler     gentamicin (GARAMYCIN) 0.1 % cream     HYDROcodone-acetaminophen (NORCO) 5-325 MG per tablet     ketoconazole (NIZORAL) 2 % shampoo     montelukast (SINGULAIR) 10 MG tablet     simvastatin (ZOCOR) 80 MG tablet     tofacitinib (XELJANZ) 5 MG tablet     triamterene (DYRENIUM) 50 MG capsule     predniSONE (DELTASONE) 5 MG tablet     triamcinolone (KENALOG) 0.1 % cream     No current facility-administered medications for this visit.         Allergies   Allergen Reactions     Seasonal Allergies Other (See Comments)     Runny nose, eyes, difficulty breathing         Review of Systems:   A 10 point review of systems was negative beyond that stated above in the HPI.       Physical exam:   /79   Pulse 72    GENERAL:  A well-appearing  female appearing her stated age, affect normal, cooperative with the exam, alert and oriented x3.     SKIN:  A focused examination of the scalp, face, neck, hands, nails and back is performed.   No longer with alopecic patches. Mild thinning over the vertex. Negative hair pull test. Recent hairdye noted. Eyelid/lash hair approaching normal limits.  No other lesions noted today.  See photos.         Impression/Plan:   1.  Alopecia areata, rapidly progressive with secondary folliculitis, now much improved.  History of JRA/ROSARIO. Both conditions significantly improved on tofacitinib, which she has been on since approximately 05/2017. Patient stopped this medication in late spring and did well for several weeks, but ultimately AA process restarted with significant loss of hair (joint sx worsening as well).  Restarted tofacitinib 5mg BID in August, which she has tolerated this well without any significant side effects.  Again, we counseled her on the risks and benefits of the  treatment approaches. No longer with patchy alopecic disease today; reassuring. No indication for additional ILK at this time. Plan today:   - Continue tofacitinib 5 mg b.i.d.  Pending safety labs today (CBC, CMP, lipid panel, UA). Standing orders placed previously.   - Safety labs to be repeated at f/u visit in 3 months   - Continue with ketoconazole shampoo approximately 2-3 times weekly to the scalp.   - Continue minoxidil 5% foam, double dose once daily to the affected scalp.  -Awaiting Rheum appointment; has scheduled rheum appt 1/30/19 with Dr. Pardo. Appreciate assistance.   -  All questions were answered to her apparent satisfaction.       2.  Mild dermatitis on external pinnae bilaterally; Resolved previously.   -Continue PRN triamcinolone 0.1% cream b.i.d. p.r.n. With reoccurrence.      The patient was seen and discussed with Dr. Juan Dwyer MD who was staff for this encounter.      Follow up in 3 months, or sooner PRN.     Resident:  MD Edmund Erwin MD  PGY3 Dermatology  [phone]    Staff Physician Comments:   I saw and evaluated the patient with the resident and I edited the assessment and plan as documented in the note. I was present for the examination.    Juan Dwyer MD   of Dermatology  Department of Dermatology  [facility] [employer]

## 2019-01-10 NOTE — NURSING NOTE
Dermatology Rooming Note    Eliane Lockhart's goals for this visit include:   Chief Complaint   Patient presents with     Derm Problem     Hairloss f/u , Eliane states she has had some hair growth since her last visit.      Madie Paiz LPN

## 2019-01-10 NOTE — LETTER
1/10/2019       RE: Eliane Lockhart  [address]  [facility] [address]     Dear Colleague,    Thank you for referring your patient, Eliane Lockhart, to the [facility] DERMATOLOGY at [facility]. Please see a copy of my visit note below.    [facility] Dermatology Note      Dermatology Problem List:   1.  Alopecia areata, rapidly progressive, onset 10/2010 with scalp folliculitis. Currently on oral tofacitinib.  Repeat ILK 12/21/2017 and 3/22/2018 .   2.  ROSARIO/JRA, improved with above. Ref to rheum 3/22/2018.   3.  Mild dermatitis, ears, triamcinolone cream.       Encounter Date:  1/10/2019      CC: Patient presents with:  Derm Problem: Hairloss f/u , Eliane states she has had some hair growth since her last visit.          History of Present Illness:   Eliane Lockhart is a pleasant, 58-year-old woman who presents today in followup for alopecia areata and ROSARIO/JRA.  She was last seen 10/4/2018.  At the last visit she received 3 ml ILK 10. She tolerated this well and notes signfiicant regrowth at the previously treated site since the last visit. Additionally, she was continued on tofacitinib at 5 mg BID. Continues on this medication today without any issues or side effects. Since the last visit she notes improved regrowth; notably grey hairs anterior. Facial (eyebrow/lash) hair has regrown well. Now needing to shave the legs again. Denies ongoing shedding. Joint symptoms significantly improved on medication.      She denies significant infectious symptoms, including fevers, chills, sweats, unexplained weight loss, new lumps or bumps, respiratory symptoms, GI symptoms, among others.  The patient notes her joints are significantly improved on this medication.  Awaiting Rheum appointment; has scheduled rheum appt 1/30/19 with Dr. Pardo.    Safety labs pending today.        Past Medical History:    JRA/ROSARIO, treated with methotrexate in the distant  past, now with NSAIDs and improved with tofacitinib as above, obesity, hyperlipidemia, asthma requiring multiple inhalers and intermittent prednisone.      Family History:   Daughter with atopic dermatitis; otherwise, no chronic skin or autoimmune conditions.        Current Outpatient Medications   Medication     albuterol (2.5 MG/3ML) 0.083% neb solution     albuterol (VENTOLIN HFA) 108 (90 BASE) MCG/ACT Inhaler     fluticasone-salmeterol (ADVAIR DISKUS) 250-50 MCG/DOSE diskus inhaler     gentamicin (GARAMYCIN) 0.1 % cream     HYDROcodone-acetaminophen (NORCO) 5-325 MG per tablet     ketoconazole (NIZORAL) 2 % shampoo     montelukast (SINGULAIR) 10 MG tablet     simvastatin (ZOCOR) 80 MG tablet     tofacitinib (XELJANZ) 5 MG tablet     triamterene (DYRENIUM) 50 MG capsule     predniSONE (DELTASONE) 5 MG tablet     triamcinolone (KENALOG) 0.1 % cream     No current facility-administered medications for this visit.         Allergies   Allergen Reactions     Seasonal Allergies Other (See Comments)     Runny nose, eyes, difficulty breathing         Review of Systems:   A 10 point review of systems was negative beyond that stated above in the HPI.       Physical exam:   /79   Pulse 72    GENERAL:  A well-appearing  female appearing her stated age, affect normal, cooperative with the exam, alert and oriented x3.     SKIN:  A focused examination of the scalp, face, neck, hands, nails and back is performed.   No longer with alopecic patches. Mild thinning over the vertex. Negative hair pull test. Recent hairdye noted. Eyelid/lash hair approaching normal limits.  No other lesions noted today.  See photos.         Impression/Plan:   1.  Alopecia areata, rapidly progressive with secondary folliculitis, now much improved.  History of JRA/ROSAIRO. Both conditions significantly improved on tofacitinib, which she has been on since approximately 05/2017. Patient stopped this medication in late spring and did well for  several weeks, but ultimately AA process restarted with significant loss of hair (joint sx worsening as well).  Restarted tofacitinib 5mg BID in August, which she has tolerated this well without any significant side effects.  Again, we counseled her on the risks and benefits of the treatment approaches. No longer with patchy alopecic disease today; reassuring. No indication for additional ILK at this time. Plan today:   - Continue tofacitinib 5 mg b.i.d.  Pending safety labs today (CBC, CMP, lipid panel, UA). Standing orders placed previously.   - Safety labs to be repeated at f/u visit in 3 months   - Continue with ketoconazole shampoo approximately 2-3 times weekly to the scalp.   - Continue minoxidil 5% foam, double dose once daily to the affected scalp.  -Awaiting Rheum appointment; has scheduled rheum appt 1/30/19 with Dr. Pardo. Appreciate assistance.   -  All questions were answered to her apparent satisfaction.       2.  Mild dermatitis on external pinnae bilaterally; Resolved previously.   -Continue PRN triamcinolone 0.1% cream b.i.d. p.r.n. With reoccurrence.      The patient was seen and discussed with Dr. Juan Dwyer MD who was staff for this encounter.      Follow up in 3 months, or sooner PRN.     Resident:  MD Edmund Erwin MD  PGY3 Dermatology  [phone]    Staff Physician Comments:   I saw and evaluated the patient with the resident and I edited the assessment and plan as documented in the note. I was present for the examination.    Juan Dwyer MD   of Dermatology  Department of Dermatology  [facility]

## 2019-01-13 ENCOUNTER — TELEPHONE (OUTPATIENT)
Dept: DERMATOLOGY | Facility: CLINIC | Age: 59
End: 2019-01-13

## 2019-01-13 DIAGNOSIS — L63.9 AA (ALOPECIA AREATA): ICD-10-CM

## 2019-01-13 NOTE — TELEPHONE ENCOUNTER
Labs reviewed. Reassuring. Safe to continue on Xeljanz. Refilled today. It appears that a results letter is being sent by Dr. Siddiqi's team; appreciate.     Edmund Leonard MD  PGY4 Dermatology  [phone]

## 2019-03-20 ENCOUNTER — OFFICE VISIT (OUTPATIENT)
Dept: RHEUMATOLOGY | Facility: CLINIC | Age: 59
End: 2019-03-20
Attending: INTERNAL MEDICINE
Payer: COMMERCIAL

## 2019-03-20 ENCOUNTER — ANCILLARY PROCEDURE (OUTPATIENT)
Dept: GENERAL RADIOLOGY | Facility: CLINIC | Age: 59
End: 2019-03-20
Attending: INTERNAL MEDICINE
Payer: COMMERCIAL

## 2019-03-20 VITALS
OXYGEN SATURATION: 96 % | WEIGHT: 227.2 LBS | SYSTOLIC BLOOD PRESSURE: 113 MMHG | TEMPERATURE: 98.2 F | HEART RATE: 65 BPM | HEIGHT: 64 IN | DIASTOLIC BLOOD PRESSURE: 77 MMHG | BODY MASS INDEX: 38.79 KG/M2

## 2019-03-20 DIAGNOSIS — M08.40 JIA (JUVENILE IDIOPATHIC ARTHRITIS), OLIGOARTHRITIS, PERSISTENT (H): ICD-10-CM

## 2019-03-20 DIAGNOSIS — M08.40 JIA (JUVENILE IDIOPATHIC ARTHRITIS), OLIGOARTHRITIS, PERSISTENT (H): Primary | ICD-10-CM

## 2019-03-20 LAB
CREAT SERPL-MCNC: 0.79 MG/DL (ref 0.52–1.04)
CRP SERPL-MCNC: <2.9 MG/L (ref 0–8)
ERYTHROCYTE [DISTWIDTH] IN BLOOD BY AUTOMATED COUNT: 12.5 % (ref 10–15)
GFR SERPL CREATININE-BSD FRML MDRD: 83 ML/MIN/{1.73_M2}
HCT VFR BLD AUTO: 43.8 % (ref 35–47)
HGB BLD-MCNC: 14.8 G/DL (ref 11.7–15.7)
MCH RBC QN AUTO: 31.4 PG (ref 26.5–33)
MCHC RBC AUTO-ENTMCNC: 33.8 G/DL (ref 31.5–36.5)
MCV RBC AUTO: 93 FL (ref 78–100)
PLATELET # BLD AUTO: 279 10E9/L (ref 150–450)
RBC # BLD AUTO: 4.71 10E12/L (ref 3.8–5.2)
WBC # BLD AUTO: 7.8 10E9/L (ref 4–11)

## 2019-03-20 PROCEDURE — G0463 HOSPITAL OUTPT CLINIC VISIT: HCPCS | Mod: ZF

## 2019-03-20 PROCEDURE — 82565 ASSAY OF CREATININE: CPT | Performed by: INTERNAL MEDICINE

## 2019-03-20 PROCEDURE — 36415 COLL VENOUS BLD VENIPUNCTURE: CPT | Performed by: INTERNAL MEDICINE

## 2019-03-20 PROCEDURE — 85027 COMPLETE CBC AUTOMATED: CPT | Performed by: INTERNAL MEDICINE

## 2019-03-20 PROCEDURE — 86140 C-REACTIVE PROTEIN: CPT | Performed by: INTERNAL MEDICINE

## 2019-03-20 ASSESSMENT — PAIN SCALES - GENERAL: PAINLEVEL: MODERATE PAIN (4)

## 2019-03-20 ASSESSMENT — MIFFLIN-ST. JEOR: SCORE: 1595.57

## 2019-03-20 NOTE — LETTER
3/20/2019      RE: Eliane Lockhart  [address]  [facility] [address]       [facility]  Rheumatology Clinic  Edd Black MD  2019     Name: Eliane Lockhart  MRN: [id number]  Age: 59 year old  : 1960  Referring provider: Jayda Bocanegra*     Assessment and Plan:  # ROSARIO (juvenile idiopathic arthritis), oligoarthritis, persistent (H)  Inflammation first noticed in the left forefoot in childhood. Progressive pain in the small joints of the hands began in her 20s and slowly worsened until she was put on tofacitinib (for alopecia areata) by Dermatology. Tofacitinib treatment since 2017 has been associated with decreased pain and swelling in foot and hands from 8/10 to a 3/10 and morning stiffness from >4 hours to <2 hours. On exam she has reduced range motion on toes 2-5 of the left foot and tenderness over the 2nd MTP of the left foot and 4th MCP of the left hand. Strength and tone are normal throughout. Fist formation is full. 2013 laboratory testing was negative for RF, as well as antibodies against CCP, SHAHZAD, SM, RO, LA, RNP, SCL-70, Centromere, dsDNA, Cardiolipin and chromatin. Repeat SHAHZAD in 2017 was borderline at 1.3 (RR <1.0).     Given the prominent morning stiffness, responsiveness to tofacitinib and corticosteroids, and history of visible joint swelling, it is likely that Mrs. Lockhart has seronegative Inflammatory arthritis, likely a progression of oligoarticular ROSARIO diagnosed at age 10.    # Chronic left forefoot pain: With the decrease in in plantar flexion of the left toes 2-5, the tenderness over the dorsal aspect of the left foot, and previous imaging results showing flattening of the 2nd left metarsal head (), a repeat foot x-ray is warranted. If progressive bony damage from inflammatory arthritis is not demonstrated, a referral to Podiatry will be important for further evaluation. I will also order CRP and CBC to look for signs of continuing inflammation, as well  "as a baseline creatinine.    I do not recommend altering her current medications. Tofacitinib monotherapy has been associated with dramatic improvement in inflammatory arthritis symptoms.  If CRP comes back elevated we would consider combination therapy (with plaquenil or methotrexate) to decrease systemic inflammation and further improve her symptoms.    # Chronic L lateral thigh pain: She also complains of intermittnt hip pain for the last 5 years that improves with heat.  Deep palpation over the lateral aspect of the left thigh reproduces hip pain. Location of hip pain symptoms do not support an arthritic etiology, but rather are suggestive of trochanteric bursitis. I recommend applying heat backs for 15-20 min 2-3x a day and continuing regular exercise. Use Prn non-steroidals such as ibuprofen 400-600 mg for residual discomfort.    - X-ray lt Foot G/E 3 views  - CRP inflammation  - Creatinine  - CBC with platelets    Follow-up: Return in about 6 months (around 9/20/2019).     Written by Marianne Lopez (MS3)    I saw this patient with the medical student, who acted as scribe for me. I agree with the findings and recommendations.    Edd Black M.D.  Staff Rheumatologist, [facility]  Pager [phone]    HPI:   Eliane Lockhart is a 59 year old female with a history of juvenile rheumatoid arthritis who presents for evaluation.     She was previously evaluated by Dr. Suresh of rheumatology in 2013, at which time the following history was obtained:   \"She was diagnosed with juvenile rheumatoid arthritis at age 10. This affected her left foot with swelling. She has had stable x-ray changes at the left second MTP joint since then. She had been treated with a drug called Tandaril which was taken off the market. She believes that this affected the bone in her toe. She was treated for many years with aspirin, which was more effective. She had followed with Dr. Merino since the early 1990s. She recalls drug such " "as Plaquenil, methotrexate, and lastly or Arava. She thinks she took methotrexate off and on a couple years, and possibly one year on Arava. It was difficult for her to be compliant with laboratory monitoring. She states when she stopped these medicines, she felt the Tylenol and ibuprofen work just as well.    She states her left foot has always been bothersome. She has decreased range of motion in the left second toe. Her foot feels better if she wears a small heal. She has never seen a podiatrist.    She is bothered by intermittent right hand MCP pain and swelling. She is left-handed, but the left hand is not as much affected. She notices pain with grasping, such as pulling wet laundry out of the machine or picking up groceries. She notices \"a little\" morning stiffness, lasting approximately 30 minutes. She notes left lateral hip pain, particularly with lying on her side.    She takes ibuprofen 400 mg one to two times per day. She finds it helpful.\"    Dr. Suresh noted chronic pain in the left second toe related to deformity and was concerned about avascular necrosis. There was no evidence of inflammatory arthritis at that time and plan was to continue ibuprofen as needed.     She is being followed by dermatology here for alopecia areata and has been referred to our clinic to establish care. She reports that over the course of a week, her hair began falling out in clumps.     Today, she reports that she was diagnosed with juvenile rheumatoid arthritis at age 10 when her left foot became so swollen that she could not get it in her shoes. She was on Tandaril briefly but this was subsequently taken off of the market, so she stopped the medication and also noted that aspirin was more effective. In her 20s, she developed hand pain, worse with grasping. She was followed by rheumatology for 5-6 years and was on methotrexate and plaquenil for an unknown period of time. She lost follow up due to problems with insurance. " She also had some improvement in pain after some weight loss and managed pain with ibuprofen. She was then last seen in 2013, as noted above, but never followed up at the initial evaluation. Her hand pain progressed gradually since onset in her 20s.     She reports that after she was started on xeljanz by dermatology about 2 years ago when she lost the majority of her hair within a week, her hand and foot pain improved substantially. Swelling over all MCPs and morning stiffness, which would previously last until about noon, also improved. She was also on prednisone at one point and has used ibuprofen, which also improved her foot and hand pain. Since starting xeljanz, she no longer requires ibuprofen. Her pain is overall about 80-90 percent improved, however she continues to have left forefoot pain, which makes it difficult to walk or stand for extended periods of time. The right foot is not particularly bothersome. She typically will walk for about 30 minutes at a time without being limited by foot pain. She additionally endorses left hip pain, worse recently. This typically worsens when she is less active and she attributes the recent flare to sitting for prolonged periods of time at work. Her heated seat in the car seems to help with pain. Her hair has since regrown.     She had cataract surgery in her 30s and reports that she has family history of early development of cataracts. She follows with ophthalmology regularly for dilated exams. Glaucoma was ruled out twice and she was found to have elevated pressures. She has no history of uveitis.     Review of Systems:   Pertinent items are noted in HPI or as below, remainder of complete ROS is negative.      No recent problems with hearing or vision. No swallowing problems.   No breathing difficulty, shortness of breath, coughing, or wheezing  No chest pain or palpitations  No heart burn, indigestion, abdominal pain, nausea, vomiting, diarrhea  No urination  problems, no bloody, cloudy urine, no dysuria  No numbing, tingling, weakness  No headaches or confusion  No rashes. No easy bleeding or bruising.     Active Medications:     Current Outpatient Medications:      albuterol (2.5 MG/3ML) 0.083% neb solution, , Disp: , Rfl:      albuterol (VENTOLIN HFA) 108 (90 BASE) MCG/ACT Inhaler, , Disp: , Rfl:      gentamicin (GARAMYCIN) 0.1 % cream, , Disp: , Rfl:      HYDROcodone-acetaminophen (NORCO) 5-325 MG per tablet, , Disp: , Rfl:      ketoconazole (NIZORAL) 2 % shampoo, Three times weekly: lather into scalp, leave in place for 3-5 minutes, then rinse., Disp: 120 mL, Rfl: 11     montelukast (SINGULAIR) 10 MG tablet, daily , Disp: , Rfl:      simvastatin (ZOCOR) 80 MG tablet, daily , Disp: , Rfl:      tofacitinib (XELJANZ) 5 MG tablet, Take 1 tablet (5 mg) by mouth 2 times daily, Disp: 60 tablet, Rfl: 2     triamterene (DYRENIUM) 50 MG capsule, daily , Disp: , Rfl:      fluticasone-salmeterol (ADVAIR DISKUS) 250-50 MCG/DOSE diskus inhaler, , Disp: , Rfl:      predniSONE (DELTASONE) 5 MG tablet, Take 8 tablets for 5 days, 7 tablets for 3 days, 6 tablets for 3 days, 5 tablets for 3 days, 4 tablets for 3 days, 3 tablets for 3 days, 2 tablets for 3 days, then 1 tablet for 3 days. (Patient not taking: Reported on 12/21/2017), Disp: 124 tablet, Rfl: 0     triamcinolone (KENALOG) 0.1 % cream, Apply twice daily for ear rash as needed. (Patient not taking: Reported on 1/10/2019), Disp: 80 g, Rfl: 2      Allergies:   Seasonal allergies      Past Medical History:  Alopecia areata  Inflammatory arthritis   Dermatitis   Asthma  Glaucoma  Obesity   Hypercholesteremia      Past Surgical History:  Cataract bilateral 1996  Benign mass removed from rib cage 2000     Family History:   Father: diabetes mellitus, emphysema  Mother: diabetes mellitus   Maternal cousins x 2: breast cancer   Aunt: breast cancer   Daughter: atopic dermatitis      No family history of autoimmune disease   Multiple  "family members with spine surgery.     Social History:   No tobacco use.   Occasional alcohol use.     Physical Exam:   /77   Pulse 65   Temp 98.2  F (36.8  C) (Oral)   Ht 1.626 m (5' 4\")   Wt 103.1 kg (227 lb 3.2 oz)   SpO2 96%   BMI 39.00 kg/m      Wt Readings from Last 4 Encounters:   03/20/19 103.1 kg (227 lb 3.2 oz)     Constitutional: Well-developed, appearing stated age; cooperative  Eyes: Normal EOM, PERRLA, vision, conjunctiva, sclera  ENT: Normal external ears, nose, hearing, lips, teeth, gums, throat. No mucous membrane lesions, normal saliva pool  Neck: No mass or thyroid enlargement  Resp: Lungs clear to auscultation, nl to palpation  CV: RRR, no murmurs, rubs or gallops, no edema  GI: No ABD mass or tenderness, no HSM  : Not tested  Lymph: No cervical, supraclavicular, inguinal or epitrochlear nodes  MS: Hyperextension at left 3rd PIP compared to the right. Fist formation is full. Tender at the left 4th MP but no synovial thickening. Internal rotation of the right hip to 45  and external rotation to 85 . No crepitus at the knees. Knees and ankles are both cool. Abduction of left hip is 80 . Tenderness over the left greater trochanter.  Right 4th toe larger compared to the left, enlargement is distal. Independent dorsi and plantar flexion is restricted in the left 2nd-5th digits. Very restricted plantar flexion, less restricted dorsiflexion. Tenderness at the left 2nd MTP but not at the first MTP. Right foot does not show tenderness.   Skin: No nail pitting, alopecia, rash, nodules or lesions  Neuro: Normal cranial nerves, strength, sensation, DTRs.   Psych: Normal judgement, orientation, memory, affect.     Laboratory:   RHEUM RESULTS Latest Ref Rng & Units 7/23/2018 10/4/2018 1/10/2019   SHAHZAD SCREEN BY EIA <1.0 - - -   AST 0 - 45 U/L 32 25 25   ALT 0 - 50 U/L 37 43 40   ALBUMIN 3.4 - 5.0 g/dL 3.9 3.5 3.9   WBC 4.0 - 11.0 10e9/L 10.9 7.5 8.0   RBC 3.8 - 5.2 10e12/L 4.62 4.44 4.53   HGB " 11.7 - 15.7 g/dL 14.1 13.8 14.4   HCT 35.0 - 47.0 % 42.0 41.4 43.3   MCV 78 - 100 fl 91 93 96   MCHC 31.5 - 36.5 g/dL 33.6 33.3 33.3   RDW 10.0 - 15.0 % 12.0 13.0 12.5    - 450 10e9/L 310 293 303   CREATININE 0.52 - 1.04 mg/dL 0.80 0.93 0.80   GFR ESTIMATE, IF BLACK >60 mL/min/[1.73:m2] 89 75 >90   GFR ESTIMATE >60 mL/min/[1.73:m2] 74 62 81   HEPATITIS C ANTIBODY NR - - -     SHAHZAD Screen by EIA   Date Value Ref Range Status   01/26/2017 1.3 (H) <1.0 Final     Comment:     Interpretation:  Positive     Hep B Surface Agn   Date Value Ref Range Status   06/29/2017 Nonreactive NR Final     In 10/2018 electrolytes, creatinine, and liver panel were all negative or normal. In 2013, CCP, RF were negative, as was SHAHZAD, extractible nuclear antigen panel, anti-DNA, and anti-phospholipid antibodies. CBC and sed rate were normal. Chest x-ray in October was normal. X-rays of the feet in 2013 showed left 2nd MTP deformity and hypertrophy with flattening of the distal 2nd metatarsal head. No erosions. Hand x-rays from 2013 were negative     Scribe Disclosure:   I, Blanquita Anaya, am serving as a scribe to document services personally performed by Edd Black MD at this visit, based upon the provider's statements to me. All documentation has been reviewed by the aforementioned provider prior to being entered into the official medical record.     Portions of this medical record were completed by a scribe. UPON MY REVIEW AND AUTHENTICATION BY ELECTRONIC SIGNATURE, this confirms (a) I performed the applicable clinical services, and (b) the record is accurate.      Edd Black MD

## 2019-03-20 NOTE — NURSING NOTE
"Chief Complaint   Patient presents with     Consult     Inflammatory arthritis     /77   Pulse 65   Temp 98.2  F (36.8  C) (Oral)   Ht 1.626 m (5' 4\")   Wt 103.1 kg (227 lb 3.2 oz)   SpO2 96%   BMI 39.00 kg/m    Esther Brown MA    "

## 2019-03-20 NOTE — PROGRESS NOTES
[facility]  Rheumatology Clinic  Edd Black MD  2019     Name: Eliane Lockhart  MRN: [id number]  Age: 59 year old  : 1960  Referring provider: Jayda Bocanegra*     Assessment and Plan:  # ROSARIO (juvenile idiopathic arthritis), oligoarthritis, persistent (H)  Inflammation first noticed in the left forefoot in childhood. Progressive pain in the small joints of the hands began in her 20s and slowly worsened until she was put on tofacitinib (for alopecia areata) by Dermatology. Tofacitinib treatment since 2017 has been associated with decreased pain and swelling in foot and hands from 8/10 to a 3/10 and morning stiffness from >4 hours to <2 hours. On exam she has reduced range motion on toes 2-5 of the left foot and tenderness over the 2nd MTP of the left foot and 4th MCP of the left hand. Strength and tone are normal throughout. Fist formation is full. 2013 laboratory testing was negative for RF, as well as antibodies against CCP, SHAHZAD, SM, RO, LA, RNP, SCL-70, Centromere, dsDNA, Cardiolipin and chromatin. Repeat SHAHZAD in 2017 was borderline at 1.3 (RR <1.0).     Given the prominent morning stiffness, responsiveness to tofacitinib and corticosteroids, and history of visible joint swelling, it is likely that Mrs. Lockhart has seronegative Inflammatory arthritis, likely a progression of oligoarticular ROSARIO diagnosed at age 10.    # Chronic left forefoot pain: With the decrease in in plantar flexion of the left toes 2-5, the tenderness over the dorsal aspect of the left foot, and previous imaging results showing flattening of the 2nd left metarsal head (), a repeat foot x-ray is warranted. If progressive bony damage from inflammatory arthritis is not demonstrated, a referral to Podiatry will be important for further evaluation. I will also order CRP and CBC to look for signs of continuing inflammation, as well as a baseline creatinine.    I do not recommend altering her current medications.  "Tofacitinib monotherapy has been associated with dramatic improvement in inflammatory arthritis symptoms.  If CRP comes back elevated we would consider combination therapy (with plaquenil or methotrexate) to decrease systemic inflammation and further improve her symptoms.    # Chronic L lateral thigh pain: She also complains of intermittnt hip pain for the last 5 years that improves with heat.  Deep palpation over the lateral aspect of the left thigh reproduces hip pain. Location of hip pain symptoms do not support an arthritic etiology, but rather are suggestive of trochanteric bursitis. I recommend applying heat backs for 15-20 min 2-3x a day and continuing regular exercise. Use Prn non-steroidals such as ibuprofen 400-600 mg for residual discomfort.    - X-ray lt Foot G/E 3 views  - CRP inflammation  - Creatinine  - CBC with platelets    Follow-up: Return in about 6 months (around 9/20/2019).     Written by Marianne Lopez ([email or web address])    I saw this patient with the medical student, who acted as scribe for me. I agree with the findings and recommendations.    Edd Black M.D.  Staff Rheumatologist, [facility]  Pager [phone]    HPI:   Eliane Lockhart is a 59 year old female with a history of juvenile rheumatoid arthritis who presents for evaluation.     She was previously evaluated by Dr. Suresh of rheumatology in 2013, at which time the following history was obtained:   \"She was diagnosed with juvenile rheumatoid arthritis at age 10. This affected her left foot with swelling. She has had stable x-ray changes at the left second MTP joint since then. She had been treated with a drug called Tandaril which was taken off the market. She believes that this affected the bone in her toe. She was treated for many years with aspirin, which was more effective. She had followed with Dr. Merino since the early 1990s. She recalls drug such as Plaquenil, methotrexate, and lastly or Arava. She thinks she took methotrexate " "off and on a couple years, and possibly one year on Arava. It was difficult for her to be compliant with laboratory monitoring. She states when she stopped these medicines, she felt the Tylenol and ibuprofen work just as well.    She states her left foot has always been bothersome. She has decreased range of motion in the left second toe. Her foot feels better if she wears a small heal. She has never seen a podiatrist.    She is bothered by intermittent right hand MCP pain and swelling. She is left-handed, but the left hand is not as much affected. She notices pain with grasping, such as pulling wet laundry out of the machine or picking up groceries. She notices \"a little\" morning stiffness, lasting approximately 30 minutes. She notes left lateral hip pain, particularly with lying on her side.    She takes ibuprofen 400 mg one to two times per day. She finds it helpful.\"    Dr. Suresh noted chronic pain in the left second toe related to deformity and was concerned about avascular necrosis. There was no evidence of inflammatory arthritis at that time and plan was to continue ibuprofen as needed.     She is being followed by dermatology here for alopecia areata and has been referred to our clinic to establish care. She reports that over the course of a week, her hair began falling out in clumps.     Today, she reports that she was diagnosed with juvenile rheumatoid arthritis at age 10 when her left foot became so swollen that she could not get it in her shoes. She was on Tandaril briefly but this was subsequently taken off of the market, so she stopped the medication and also noted that aspirin was more effective. In her 20s, she developed hand pain, worse with grasping. She was followed by rheumatology for 5-6 years and was on methotrexate and plaquenil for an unknown period of time. She lost follow up due to problems with insurance. She also had some improvement in pain after some weight loss and managed pain with " ibuprofen. She was then last seen in 2013, as noted above, but never followed up at the initial evaluation. Her hand pain progressed gradually since onset in her 20s.     She reports that after she was started on xeljanz by dermatology about 2 years ago when she lost the majority of her hair within a week, her hand and foot pain improved substantially. Swelling over all MCPs and morning stiffness, which would previously last until about noon, also improved. She was also on prednisone at one point and has used ibuprofen, which also improved her foot and hand pain. Since starting xeljanz, she no longer requires ibuprofen. Her pain is overall about 80-90 percent improved, however she continues to have left forefoot pain, which makes it difficult to walk or stand for extended periods of time. The right foot is not particularly bothersome. She typically will walk for about 30 minutes at a time without being limited by foot pain. She additionally endorses left hip pain, worse recently. This typically worsens when she is less active and she attributes the recent flare to sitting for prolonged periods of time at work. Her heated seat in the car seems to help with pain. Her hair has since regrown.     She had cataract surgery in her 30s and reports that she has family history of early development of cataracts. She follows with ophthalmology regularly for dilated exams. Glaucoma was ruled out twice and she was found to have elevated pressures. She has no history of uveitis.     Review of Systems:   Pertinent items are noted in HPI or as below, remainder of complete ROS is negative.      No recent problems with hearing or vision. No swallowing problems.   No breathing difficulty, shortness of breath, coughing, or wheezing  No chest pain or palpitations  No heart burn, indigestion, abdominal pain, nausea, vomiting, diarrhea  No urination problems, no bloody, cloudy urine, no dysuria  No numbing, tingling, weakness  No headaches  or confusion  No rashes. No easy bleeding or bruising.     Active Medications:     Current Outpatient Medications:      albuterol (2.5 MG/3ML) 0.083% neb solution, , Disp: , Rfl:      albuterol (VENTOLIN HFA) 108 (90 BASE) MCG/ACT Inhaler, , Disp: , Rfl:      gentamicin (GARAMYCIN) 0.1 % cream, , Disp: , Rfl:      HYDROcodone-acetaminophen (NORCO) 5-325 MG per tablet, , Disp: , Rfl:      ketoconazole (NIZORAL) 2 % shampoo, Three times weekly: lather into scalp, leave in place for 3-5 minutes, then rinse., Disp: 120 mL, Rfl: 11     montelukast (SINGULAIR) 10 MG tablet, daily , Disp: , Rfl:      simvastatin (ZOCOR) 80 MG tablet, daily , Disp: , Rfl:      tofacitinib (XELJANZ) 5 MG tablet, Take 1 tablet (5 mg) by mouth 2 times daily, Disp: 60 tablet, Rfl: 2     triamterene (DYRENIUM) 50 MG capsule, daily , Disp: , Rfl:      fluticasone-salmeterol (ADVAIR DISKUS) 250-50 MCG/DOSE diskus inhaler, , Disp: , Rfl:      predniSONE (DELTASONE) 5 MG tablet, Take 8 tablets for 5 days, 7 tablets for 3 days, 6 tablets for 3 days, 5 tablets for 3 days, 4 tablets for 3 days, 3 tablets for 3 days, 2 tablets for 3 days, then 1 tablet for 3 days. (Patient not taking: Reported on 12/21/2017), Disp: 124 tablet, Rfl: 0     triamcinolone (KENALOG) 0.1 % cream, Apply twice daily for ear rash as needed. (Patient not taking: Reported on 1/10/2019), Disp: 80 g, Rfl: 2      Allergies:   Seasonal allergies      Past Medical History:  Alopecia areata  Inflammatory arthritis   Dermatitis   Asthma  Glaucoma  Obesity   Hypercholesteremia      Past Surgical History:  Cataract bilateral 1996  Benign mass removed from rib cage 2000     Family History:   Father: diabetes mellitus, emphysema  Mother: diabetes mellitus   Maternal cousins x 2: breast cancer   Aunt: breast cancer   Daughter: atopic dermatitis      No family history of autoimmune disease   Multiple family members with spine surgery.     Social History:   No tobacco use.   Occasional alcohol  "use.     Physical Exam:   /77   Pulse 65   Temp 98.2  F (36.8  C) (Oral)   Ht 1.626 m (5' 4\")   Wt 103.1 kg (227 lb 3.2 oz)   SpO2 96%   BMI 39.00 kg/m     Wt Readings from Last 4 Encounters:   03/20/19 103.1 kg (227 lb 3.2 oz)     Constitutional: Well-developed, appearing stated age; cooperative  Eyes: Normal EOM, PERRLA, vision, conjunctiva, sclera  ENT: Normal external ears, nose, hearing, lips, teeth, gums, throat. No mucous membrane lesions, normal saliva pool  Neck: No mass or thyroid enlargement  Resp: Lungs clear to auscultation, nl to palpation  CV: RRR, no murmurs, rubs or gallops, no edema  GI: No ABD mass or tenderness, no HSM  : Not tested  Lymph: No cervical, supraclavicular, inguinal or epitrochlear nodes  MS: Hyperextension at left 3rd PIP compared to the right. Fist formation is full. Tender at the left 4th MP but no synovial thickening. Internal rotation of the right hip to 45  and external rotation to 85 . No crepitus at the knees. Knees and ankles are both cool. Abduction of left hip is 80 . Tenderness over the left greater trochanter.  Right 4th toe larger compared to the left, enlargement is distal. Independent dorsi and plantar flexion is restricted in the left 2nd-5th digits. Very restricted plantar flexion, less restricted dorsiflexion. Tenderness at the left 2nd MTP but not at the first MTP. Right foot does not show tenderness.   Skin: No nail pitting, alopecia, rash, nodules or lesions  Neuro: Normal cranial nerves, strength, sensation, DTRs.   Psych: Normal judgement, orientation, memory, affect.     Laboratory:   RHEUM RESULTS Latest Ref Rng & Units 7/23/2018 10/4/2018 1/10/2019   SHAHZAD SCREEN BY EIA <1.0 - - -   AST 0 - 45 U/L 32 25 25   ALT 0 - 50 U/L 37 43 40   ALBUMIN 3.4 - 5.0 g/dL 3.9 3.5 3.9   WBC 4.0 - 11.0 10e9/L 10.9 7.5 8.0   RBC 3.8 - 5.2 10e12/L 4.62 4.44 4.53   HGB 11.7 - 15.7 g/dL 14.1 13.8 14.4   HCT 35.0 - 47.0 % 42.0 41.4 43.3   MCV 78 - 100 fl 91 93 96 "   MCHC 31.5 - 36.5 g/dL 33.6 33.3 33.3   RDW 10.0 - 15.0 % 12.0 13.0 12.5    - 450 10e9/L 310 293 303   CREATININE 0.52 - 1.04 mg/dL 0.80 0.93 0.80   GFR ESTIMATE, IF BLACK >60 mL/min/[1.73:m2] 89 75 >90   GFR ESTIMATE >60 mL/min/[1.73:m2] 74 62 81   HEPATITIS C ANTIBODY NR - - -     SHAHZAD Screen by EIA   Date Value Ref Range Status   01/26/2017 1.3 (H) <1.0 Final     Comment:     Interpretation:  Positive     Hep B Surface Agn   Date Value Ref Range Status   06/29/2017 Nonreactive NR Final     In 10/2018 electrolytes, creatinine, and liver panel were all negative or normal. In 2013, CCP, RF were negative, as was SHAHZAD, extractible nuclear antigen panel, anti-DNA, and anti-phospholipid antibodies. CBC and sed rate were normal. Chest x-ray in October was normal. X-rays of the feet in 2013 showed left 2nd MTP deformity and hypertrophy with flattening of the distal 2nd metatarsal head. No erosions. Hand x-rays from 2013 were negative     Scribe Disclosure:   I, Blanquita Anaya, am serving as a scribe to document services personally performed by Edd Black MD at this visit, based upon the provider's statements to me. All documentation has been reviewed by the aforementioned provider prior to being entered into the official medical record.     Portions of this medical record were completed by a scribe. UPON MY REVIEW AND AUTHENTICATION BY ELECTRONIC SIGNATURE, this confirms (a) I performed the applicable clinical services, and (b) the record is accurate.

## 2019-03-20 NOTE — LETTER
3/20/2019       RE: Eliane Lockhart  [address]  [facility] [address]     Dear Colleague,    Thank you for referring your patient, Eliane Lockhart, to the [facility] RHEUMATOLOGY at [facility]. Please see a copy of my visit note below.    [facility]  Rheumatology Clinic  Edd Black MD  2019     Name: Eliane Lockhart  MRN: [id number]  Age: 59 year old  : 1960  Referring provider: Jayda Bocanegra*     Assessment and Plan:  # ROSARIO (juvenile idiopathic arthritis), oligoarthritis, persistent (H)  Inflammation first noticed in the left forefoot in childhood. Progressive pain in the small joints of the hands began in her 20s and slowly worsened until she was put on tofacitinib (for alopecia areata) by Dermatology. Tofacitinib treatment since 2017 has been associated with decreased pain and swelling in foot and hands from 8/10 to a 3/10 and morning stiffness from >4 hours to <2 hours. On exam she has reduced range motion on toes 2-5 of the left foot and tenderness over the 2nd MTP of the left foot and 4th MCP of the left hand. Strength and tone are normal throughout. Fist formation is full. 2013 laboratory testing was negative for RF, as well as antibodies against CCP, SHAHZAD, SM, RO, LA, RNP, SCL-70, Centromere, dsDNA, Cardiolipin and chromatin. Repeat SHAHZAD in 2017 was borderline at 1.3 (RR <1.0).     Given the prominent morning stiffness, responsiveness to tofacitinib and corticosteroids, and history of visible joint swelling, it is likely that Mrs. Lockhart has seronegative Inflammatory arthritis, likely a progression of oligoarticular ROSARIO diagnosed at age 10.    # Chronic left forefoot pain: With the decrease in in plantar flexion of the left toes 2-5, the tenderness over the dorsal aspect of the left foot, and previous imaging results showing flattening of the 2nd left metarsal head (), a repeat foot x-ray is warranted. If progressive bony  "damage from inflammatory arthritis is not demonstrated, a referral to Podiatry will be important for further evaluation. I will also order CRP and CBC to look for signs of continuing inflammation, as well as a baseline creatinine.    I do not recommend altering her current medications. Tofacitinib monotherapy has been associated with dramatic improvement in inflammatory arthritis symptoms.  If CRP comes back elevated we would consider combination therapy (with plaquenil or methotrexate) to decrease systemic inflammation and further improve her symptoms.    # Chronic L lateral thigh pain: She also complains of intermittnt hip pain for the last 5 years that improves with heat.  Deep palpation over the lateral aspect of the left thigh reproduces hip pain. Location of hip pain symptoms do not support an arthritic etiology, but rather are suggestive of trochanteric bursitis. I recommend applying heat backs for 15-20 min 2-3x a day and continuing regular exercise. Use Prn non-steroidals such as ibuprofen 400-600 mg for residual discomfort.    - X-ray lt Foot G/E 3 views  - CRP inflammation  - Creatinine  - CBC with platelets    Follow-up: Return in about 6 months (around 9/20/2019).     Written by Marianne Lopez (MS3)    I saw this patient with the medical student, who acted as scribe for me. I agree with the findings and recommendations.    Edd Black M.D.  Staff Rheumatologist, [facility]  Pager [phone]    HPI:   Eliane Lockhart is a 59 year old female with a history of juvenile rheumatoid arthritis who presents for evaluation.     She was previously evaluated by Dr. Suresh of rheumatology in 2013, at which time the following history was obtained:   \"She was diagnosed with juvenile rheumatoid arthritis at age 10. This affected her left foot with swelling. She has had stable x-ray changes at the left second MTP joint since then. She had been treated with a drug called Tandaril which was taken off the " "market. She believes that this affected the bone in her toe. She was treated for many years with aspirin, which was more effective. She had followed with Dr. Merino since the early 1990s. She recalls drug such as Plaquenil, methotrexate, and lastly or Arava. She thinks she took methotrexate off and on a couple years, and possibly one year on Arava. It was difficult for her to be compliant with laboratory monitoring. She states when she stopped these medicines, she felt the Tylenol and ibuprofen work just as well.    She states her left foot has always been bothersome. She has decreased range of motion in the left second toe. Her foot feels better if she wears a small heal. She has never seen a podiatrist.    She is bothered by intermittent right hand MCP pain and swelling. She is left-handed, but the left hand is not as much affected. She notices pain with grasping, such as pulling wet laundry out of the machine or picking up groceries. She notices \"a little\" morning stiffness, lasting approximately 30 minutes. She notes left lateral hip pain, particularly with lying on her side.    She takes ibuprofen 400 mg one to two times per day. She finds it helpful.\"    Dr. Suresh noted chronic pain in the left second toe related to deformity and was concerned about avascular necrosis. There was no evidence of inflammatory arthritis at that time and plan was to continue ibuprofen as needed.     She is being followed by dermatology here for alopecia areata and has been referred to our clinic to establish care. She reports that over the course of a week, her hair began falling out in clumps.     Today, she reports that she was diagnosed with juvenile rheumatoid arthritis at age 10 when her left foot became so swollen that she could not get it in her shoes. She was on Tandaril briefly but this was subsequently taken off of the market, so she stopped the medication and also noted that aspirin was more effective. In her 20s, she " developed hand pain, worse with grasping. She was followed by rheumatology for 5-6 years and was on methotrexate and plaquenil for an unknown period of time. She lost follow up due to problems with insurance. She also had some improvement in pain after some weight loss and managed pain with ibuprofen. She was then last seen in 2013, as noted above, but never followed up at the initial evaluation. Her hand pain progressed gradually since onset in her 20s.     She reports that after she was started on xeljanz by dermatology about 2 years ago when she lost the majority of her hair within a week, her hand and foot pain improved substantially. Swelling over all MCPs and morning stiffness, which would previously last until about noon, also improved. She was also on prednisone at one point and has used ibuprofen, which also improved her foot and hand pain. Since starting xeljanz, she no longer requires ibuprofen. Her pain is overall about 80-90 percent improved, however she continues to have left forefoot pain, which makes it difficult to walk or stand for extended periods of time. The right foot is not particularly bothersome. She typically will walk for about 30 minutes at a time without being limited by foot pain. She additionally endorses left hip pain, worse recently. This typically worsens when she is less active and she attributes the recent flare to sitting for prolonged periods of time at work. Her heated seat in the car seems to help with pain. Her hair has since regrown.     She had cataract surgery in her 30s and reports that she has family history of early development of cataracts. She follows with ophthalmology regularly for dilated exams. Glaucoma was ruled out twice and she was found to have elevated pressures. She has no history of uveitis.     Review of Systems:   Pertinent items are noted in HPI or as below, remainder of complete ROS is negative.      No recent problems with hearing or vision. No  swallowing problems.   No breathing difficulty, shortness of breath, coughing, or wheezing  No chest pain or palpitations  No heart burn, indigestion, abdominal pain, nausea, vomiting, diarrhea  No urination problems, no bloody, cloudy urine, no dysuria  No numbing, tingling, weakness  No headaches or confusion  No rashes. No easy bleeding or bruising.     Active Medications:     Current Outpatient Medications:      albuterol (2.5 MG/3ML) 0.083% neb solution, , Disp: , Rfl:      albuterol (VENTOLIN HFA) 108 (90 BASE) MCG/ACT Inhaler, , Disp: , Rfl:      gentamicin (GARAMYCIN) 0.1 % cream, , Disp: , Rfl:      HYDROcodone-acetaminophen (NORCO) 5-325 MG per tablet, , Disp: , Rfl:      ketoconazole (NIZORAL) 2 % shampoo, Three times weekly: lather into scalp, leave in place for 3-5 minutes, then rinse., Disp: 120 mL, Rfl: 11     montelukast (SINGULAIR) 10 MG tablet, daily , Disp: , Rfl:      simvastatin (ZOCOR) 80 MG tablet, daily , Disp: , Rfl:      tofacitinib (XELJANZ) 5 MG tablet, Take 1 tablet (5 mg) by mouth 2 times daily, Disp: 60 tablet, Rfl: 2     triamterene (DYRENIUM) 50 MG capsule, daily , Disp: , Rfl:      fluticasone-salmeterol (ADVAIR DISKUS) 250-50 MCG/DOSE diskus inhaler, , Disp: , Rfl:      predniSONE (DELTASONE) 5 MG tablet, Take 8 tablets for 5 days, 7 tablets for 3 days, 6 tablets for 3 days, 5 tablets for 3 days, 4 tablets for 3 days, 3 tablets for 3 days, 2 tablets for 3 days, then 1 tablet for 3 days. (Patient not taking: Reported on 12/21/2017), Disp: 124 tablet, Rfl: 0     triamcinolone (KENALOG) 0.1 % cream, Apply twice daily for ear rash as needed. (Patient not taking: Reported on 1/10/2019), Disp: 80 g, Rfl: 2      Allergies:   Seasonal allergies      Past Medical History:  Alopecia areata  Inflammatory arthritis   Dermatitis   Asthma  Glaucoma  Obesity   Hypercholesteremia      Past Surgical History:  Cataract bilateral 1996  Benign mass removed from rib cage 2000     Family History:  "  Father: diabetes mellitus, emphysema  Mother: diabetes mellitus   Maternal cousins x 2: breast cancer   Aunt: breast cancer   Daughter: atopic dermatitis      No family history of autoimmune disease   Multiple family members with spine surgery.     Social History:   No tobacco use.   Occasional alcohol use.     Physical Exam:   /77   Pulse 65   Temp 98.2  F (36.8  C) (Oral)   Ht 1.626 m (5' 4\")   Wt 103.1 kg (227 lb 3.2 oz)   SpO2 96%   BMI 39.00 kg/m      Wt Readings from Last 4 Encounters:   03/20/19 103.1 kg (227 lb 3.2 oz)     Constitutional: Well-developed, appearing stated age; cooperative  Eyes: Normal EOM, PERRLA, vision, conjunctiva, sclera  ENT: Normal external ears, nose, hearing, lips, teeth, gums, throat. No mucous membrane lesions, normal saliva pool  Neck: No mass or thyroid enlargement  Resp: Lungs clear to auscultation, nl to palpation  CV: RRR, no murmurs, rubs or gallops, no edema  GI: No ABD mass or tenderness, no HSM  : Not tested  Lymph: No cervical, supraclavicular, inguinal or epitrochlear nodes  MS: Hyperextension at left 3rd PIP compared to the right. Fist formation is full. Tender at the left 4th MP but no synovial thickening. Internal rotation of the right hip to 45  and external rotation to 85 . No crepitus at the knees. Knees and ankles are both cool. Abduction of left hip is 80 . Tenderness over the left greater trochanter.  Right 4th toe larger compared to the left, enlargement is distal. Independent dorsi and plantar flexion is restricted in the left 2nd-5th digits. Very restricted plantar flexion, less restricted dorsiflexion. Tenderness at the left 2nd MTP but not at the first MTP. Right foot does not show tenderness.   Skin: No nail pitting, alopecia, rash, nodules or lesions  Neuro: Normal cranial nerves, strength, sensation, DTRs.   Psych: Normal judgement, orientation, memory, affect.     Laboratory:   RHEUM RESULTS Latest Ref Rng & Units 7/23/2018 10/4/2018 " 1/10/2019   SHAHZAD SCREEN BY EIA <1.0 - - -   AST 0 - 45 U/L 32 25 25   ALT 0 - 50 U/L 37 43 40   ALBUMIN 3.4 - 5.0 g/dL 3.9 3.5 3.9   WBC 4.0 - 11.0 10e9/L 10.9 7.5 8.0   RBC 3.8 - 5.2 10e12/L 4.62 4.44 4.53   HGB 11.7 - 15.7 g/dL 14.1 13.8 14.4   HCT 35.0 - 47.0 % 42.0 41.4 43.3   MCV 78 - 100 fl 91 93 96   MCHC 31.5 - 36.5 g/dL 33.6 33.3 33.3   RDW 10.0 - 15.0 % 12.0 13.0 12.5    - 450 10e9/L 310 293 303   CREATININE 0.52 - 1.04 mg/dL 0.80 0.93 0.80   GFR ESTIMATE, IF BLACK >60 mL/min/[1.73:m2] 89 75 >90   GFR ESTIMATE >60 mL/min/[1.73:m2] 74 62 81   HEPATITIS C ANTIBODY NR - - -     SHAHZAD Screen by EIA   Date Value Ref Range Status   01/26/2017 1.3 (H) <1.0 Final     Comment:     Interpretation:  Positive     Hep B Surface Agn   Date Value Ref Range Status   06/29/2017 Nonreactive NR Final     In 10/2018 electrolytes, creatinine, and liver panel were all negative or normal. In 2013, CCP, RF were negative, as was SHAHZAD, extractible nuclear antigen panel, anti-DNA, and anti-phospholipid antibodies. CBC and sed rate were normal. Chest x-ray in October was normal. X-rays of the feet in 2013 showed left 2nd MTP deformity and hypertrophy with flattening of the distal 2nd metatarsal head. No erosions. Hand x-rays from 2013 were negative     Scribe Disclosure:   I, Blanquita Anaya, am serving as a scribe to document services personally performed by Edd Black MD at this visit, based upon the provider's statements to me. All documentation has been reviewed by the aforementioned provider prior to being entered into the official medical record.     Portions of this medical record were completed by a scribe. UPON MY REVIEW AND AUTHENTICATION BY ELECTRONIC SIGNATURE, this confirms (a) I performed the applicable clinical services, and (b) the record is accurate.      Again, thank you for allowing me to participate in the care of your patient.      Sincerely,    Edd Black MD

## 2019-03-20 NOTE — PATIENT INSTRUCTIONS
Diagnosis: Inflammatory arthritis     Plan:   - obtain x-rays of the feet - depending on findings, we will consider referral to a foot specialist   - continue xeljanz 5 mg twice daily with regular lab monitoring per dermatology   - use as needed anti-inflammatories (ibuprofen 400-600 mg) for residual hand pain   - for left hip pain, use heating pad 2 times per day for 15-20 minutes   - follow up with ophthalmology as planned in April   - follow up with me in 6 months

## 2019-03-21 DIAGNOSIS — M79.672 PAIN IN LEFT FOOT: Primary | ICD-10-CM

## 2019-03-28 ENCOUNTER — TELEPHONE (OUTPATIENT)
Dept: DERMATOLOGY | Facility: CLINIC | Age: 59
End: 2019-03-28

## 2019-04-26 LAB — MAMMOGRAM: NORMAL

## 2019-05-16 ENCOUNTER — TELEPHONE (OUTPATIENT)
Dept: DERMATOLOGY | Facility: CLINIC | Age: 59
End: 2019-05-16

## 2019-05-16 ENCOUNTER — OFFICE VISIT (OUTPATIENT)
Dept: DERMATOLOGY | Facility: CLINIC | Age: 59
End: 2019-05-16
Payer: COMMERCIAL

## 2019-05-16 DIAGNOSIS — Z79.899 ENCOUNTER FOR LONG-TERM (CURRENT) USE OF HIGH-RISK MEDICATION: Primary | ICD-10-CM

## 2019-05-16 DIAGNOSIS — L63.9 AA (ALOPECIA AREATA): ICD-10-CM

## 2019-05-16 DIAGNOSIS — M08.00 JRA (JUVENILE RHEUMATOID ARTHRITIS) (H): ICD-10-CM

## 2019-05-16 DIAGNOSIS — L63.9 ALOPECIA AREATA: ICD-10-CM

## 2019-05-16 DIAGNOSIS — Z79.899 ENCOUNTER FOR LONG-TERM (CURRENT) USE OF HIGH-RISK MEDICATION: ICD-10-CM

## 2019-05-16 LAB
ALBUMIN SERPL-MCNC: 3.8 G/DL (ref 3.4–5)
ALBUMIN UR-MCNC: NEGATIVE MG/DL
ALP SERPL-CCNC: 100 U/L (ref 40–150)
ALT SERPL W P-5'-P-CCNC: 43 U/L (ref 0–50)
ANION GAP SERPL CALCULATED.3IONS-SCNC: 7 MMOL/L (ref 3–14)
APPEARANCE UR: CLEAR
AST SERPL W P-5'-P-CCNC: 23 U/L (ref 0–45)
BASOPHILS # BLD AUTO: 0.1 10E9/L (ref 0–0.2)
BASOPHILS NFR BLD AUTO: 0.9 %
BILIRUB SERPL-MCNC: 1.6 MG/DL (ref 0.2–1.3)
BILIRUB UR QL STRIP: NEGATIVE
BUN SERPL-MCNC: 18 MG/DL (ref 7–30)
CALCIUM SERPL-MCNC: 9.2 MG/DL (ref 8.5–10.1)
CHLORIDE SERPL-SCNC: 106 MMOL/L (ref 94–109)
CHOLEST SERPL-MCNC: 152 MG/DL
CO2 SERPL-SCNC: 25 MMOL/L (ref 20–32)
COLOR UR AUTO: YELLOW
CREAT SERPL-MCNC: 0.72 MG/DL (ref 0.52–1.04)
DIFFERENTIAL METHOD BLD: NORMAL
EOSINOPHIL # BLD AUTO: 0.2 10E9/L (ref 0–0.7)
EOSINOPHIL NFR BLD AUTO: 2.7 %
ERYTHROCYTE [DISTWIDTH] IN BLOOD BY AUTOMATED COUNT: 12.5 % (ref 10–15)
GFR SERPL CREATININE-BSD FRML MDRD: >90 ML/MIN/{1.73_M2}
GLUCOSE SERPL-MCNC: 94 MG/DL (ref 70–99)
GLUCOSE UR STRIP-MCNC: NEGATIVE MG/DL
HCT VFR BLD AUTO: 40.3 % (ref 35–47)
HDLC SERPL-MCNC: 71 MG/DL
HGB BLD-MCNC: 13.5 G/DL (ref 11.7–15.7)
HGB UR QL STRIP: ABNORMAL
IMM GRANULOCYTES # BLD: 0 10E9/L (ref 0–0.4)
IMM GRANULOCYTES NFR BLD: 0.3 %
KETONES UR STRIP-MCNC: NEGATIVE MG/DL
LDLC SERPL CALC-MCNC: 72 MG/DL
LEUKOCYTE ESTERASE UR QL STRIP: NEGATIVE
LYMPHOCYTES # BLD AUTO: 2.9 10E9/L (ref 0.8–5.3)
LYMPHOCYTES NFR BLD AUTO: 41.3 %
MCH RBC QN AUTO: 31.5 PG (ref 26.5–33)
MCHC RBC AUTO-ENTMCNC: 33.5 G/DL (ref 31.5–36.5)
MCV RBC AUTO: 94 FL (ref 78–100)
MONOCYTES # BLD AUTO: 0.5 10E9/L (ref 0–1.3)
MONOCYTES NFR BLD AUTO: 6.7 %
NEUTROPHILS # BLD AUTO: 3.4 10E9/L (ref 1.6–8.3)
NEUTROPHILS NFR BLD AUTO: 48.1 %
NITRATE UR QL: NEGATIVE
NONHDLC SERPL-MCNC: 82 MG/DL
NRBC # BLD AUTO: 0 10*3/UL
NRBC BLD AUTO-RTO: 0 /100
PH UR STRIP: 6 PH (ref 5–7)
PLATELET # BLD AUTO: 278 10E9/L (ref 150–450)
POTASSIUM SERPL-SCNC: 3.8 MMOL/L (ref 3.4–5.3)
PROT SERPL-MCNC: 7.1 G/DL (ref 6.8–8.8)
RBC # BLD AUTO: 4.29 10E12/L (ref 3.8–5.2)
RBC #/AREA URNS AUTO: <1 /HPF (ref 0–2)
SODIUM SERPL-SCNC: 139 MMOL/L (ref 133–144)
SOURCE: ABNORMAL
SP GR UR STRIP: 1.01 (ref 1–1.03)
TRIGL SERPL-MCNC: 50 MG/DL
UROBILINOGEN UR STRIP-MCNC: 2 MG/DL (ref 0–2)
WBC # BLD AUTO: 7 10E9/L (ref 4–11)
WBC #/AREA URNS AUTO: 0 /HPF (ref 0–5)

## 2019-05-16 ASSESSMENT — PAIN SCALES - GENERAL: PAINLEVEL: NO PAIN (0)

## 2019-05-16 NOTE — LETTER
5/16/2019       RE: Eliane Lockhart  [address]  [facility] [address]     Dear Colleague,    Thank you for referring your patient, Eliane Lockhart, to the [facility] DERMATOLOGY at [facility]. Please see a copy of my visit note below.    [facility] Dermatology Note      Dermatology Problem List:   1.  Alopecia areata, rapidly progressive, onset 10/2010 with scalp folliculitis. Currently on oral tofacitinib.  Repeat ILK 12/21/2017 and 3/22/2018 .   2.  ROSARIO/JRA, improved with above. Follows with Dr. Pardo with Rheumatology; managed with tofacitinib monotherapy at this point.   3.  Mild dermatitis, ears, triamcinolone cream.       Encounter Date:  5/16/2019      CC: Patient presents with:  Derm Problem: Hairloss follow up, Amanda notes her is about the same but is happy with her results.          History of Present Illness:   Eliane Lockhart is a pleasant, 59-year-old woman who presents today in followup for alopecia areata and ROSARIO/JRA.  She was last seen 1/10/2019. Since the last visit she has done quite well. She continues on tofacitinib at 5 mg BID. Continues on this medication today without any issues or side effects.Notes that her scalp hair has continued to thicken; Needing hair cuts recently. Notes she is shaving regularly on the legs. No new scalp, face or body hair loss. No shedding. Joint symptoms significantly improved on medication. Now following with Dr. Pardo with Rheum; initial consult 3/20/19. She denies significant infectious symptoms, including fevers, chills, sweats, unexplained weight loss, new lumps or bumps, respiratory symptoms, GI symptoms, among others. Safety labs pending today.        Past Medical History:    JRA/ROSARIO, treated with methotrexate in the distant past, now with NSAIDs and improved with tofacitinib as above, obesity, hyperlipidemia, asthma requiring multiple inhalers and intermittent prednisone.      Family  History:   Daughter with atopic dermatitis; otherwise, no chronic skin or autoimmune conditions.        Current Outpatient Medications   Medication     albuterol (2.5 MG/3ML) 0.083% neb solution     albuterol (VENTOLIN HFA) 108 (90 BASE) MCG/ACT Inhaler     ketoconazole (NIZORAL) 2 % shampoo     montelukast (SINGULAIR) 10 MG tablet     simvastatin (ZOCOR) 80 MG tablet     tofacitinib (XELJANZ) 5 MG tablet     fluticasone-salmeterol (ADVAIR DISKUS) 250-50 MCG/DOSE diskus inhaler     gentamicin (GARAMYCIN) 0.1 % cream     HYDROcodone-acetaminophen (NORCO) 5-325 MG per tablet     predniSONE (DELTASONE) 5 MG tablet     triamcinolone (KENALOG) 0.1 % cream     triamterene (DYRENIUM) 50 MG capsule     No current facility-administered medications for this visit.         Allergies   Allergen Reactions     Seasonal Allergies Other (See Comments)     Runny nose, eyes, difficulty breathing         Review of Systems:   A 10 point review of systems was negative beyond that stated above in the HPI.       Physical exam:   There were no vitals taken for this visit.   GENERAL:  A well-appearing  female appearing her stated age, affect normal, cooperative with the exam, alert and oriented x3.     SKIN:  A focused examination of the scalp, face, neck, hands, nails and back is performed.   No longer with alopecic patches. Mild thinning over the vertex. Negative hair pull test. Recent hairdye noted. Eyelid/lash hair approaching normal limits.  No other lesions noted today.  See photos.         Impression/Plan:   1.  Alopecia areata, rapidly progressive with secondary folliculitis, now much improved.  History of JRA/ROSARIO. Both conditions significantly improved on tofacitinib, which she has been on since approximately 05/2017. Patient stopped this medication in late spring of 2018 and did well for several weeks, but ultimately AA process restarted with significant loss of hair (joint sx worsening as well).  Restarted tofacitinib  5mg BID in August 2018, which she has tolerated this well without any significant side effects.  Again, we counseled her on the risks and benefits of the treatment approaches.No longer with patchy alopecic disease today; reassuring. No indication for additional ILK at this time. SANJEEV is well controlled on Tofacitinib monotherapy; follows with Dr. Pardo.  Plan today:   - Continue tofacitinib 5 mg b.i.d.  Pending safety labs today (CBC, CMP, lipid panel, UA and quant gold today).  - Safety labs to be repeated at f/u visit in 3 months   - Continue with ketoconazole shampoo approximately 2-3 times weekly to the scalp.   - Okay to restart minoxidil 5% foam, double dose once daily to the affected scalp if mild thinning becomes bothersome to her.   - Appreciate Rheumatology assistance; follows with Dr. Pardo.   -  All questions were answered to her apparent satisfaction. Handout provided today.       The patient was seen and discussed with Dr. Stella Batres MD who was staff for this encounter.     Follow up in 3 months with Dr. Marcia Navarrete MD, or sooner PRN.     Resident:  MD Edmund Erwin MD  PGY3 Dermatology  [phone]    .I, Stella Batres MD, saw this patient with the resident and agree with the resident s findings and plan of care as documented in the resident s note.

## 2019-05-16 NOTE — PROGRESS NOTES
[facility] Dermatology Note      Dermatology Problem List:   1.  Alopecia areata, rapidly progressive, onset 10/2010 with scalp folliculitis. Currently on oral tofacitinib.  Repeat ILK 12/21/2017 and 3/22/2018 .   2.  ROSARIO/JRA, improved with above. Follows with Dr. Pardo with Rheumatology; managed with tofacitinib monotherapy at this point.   3.  Mild dermatitis, ears, triamcinolone cream.       Encounter Date:  5/16/2019      CC: Patient presents with:  Derm Problem: Hairloss follow up, Amanda notes her is about the same but is happy with her results.          History of Present Illness:   Eliane Lockhart is a pleasant, 59-year-old woman who presents today in followup for alopecia areata and ROSARIO/JRA.  She was last seen 1/10/2019. Since the last visit she has done quite well. She continues on tofacitinib at 5 mg BID. Continues on this medication today without any issues or side effects.Notes that her scalp hair has continued to thicken; Needing hair cuts recently. Notes she is shaving regularly on the legs. No new scalp, face or body hair loss. No shedding. Joint symptoms significantly improved on medication. Now following with Dr. Pardo with Rheum; initial consult 3/20/19. She denies significant infectious symptoms, including fevers, chills, sweats, unexplained weight loss, new lumps or bumps, respiratory symptoms, GI symptoms, among others. Safety labs pending today.        Past Medical History:    JRA/ROSARIO, treated with methotrexate in the distant past, now with NSAIDs and improved with tofacitinib as above, obesity, hyperlipidemia, asthma requiring multiple inhalers and intermittent prednisone.      Family History:   Daughter with atopic dermatitis; otherwise, no chronic skin or autoimmune conditions.        Current Outpatient Medications   Medication     albuterol (2.5 MG/3ML) 0.083% neb solution     albuterol (VENTOLIN HFA) 108 (90 BASE) MCG/ACT Inhaler     ketoconazole (NIZORAL) 2 %  shampoo     montelukast (SINGULAIR) 10 MG tablet     simvastatin (ZOCOR) 80 MG tablet     tofacitinib (XELJANZ) 5 MG tablet     fluticasone-salmeterol (ADVAIR DISKUS) 250-50 MCG/DOSE diskus inhaler     gentamicin (GARAMYCIN) 0.1 % cream     HYDROcodone-acetaminophen (NORCO) 5-325 MG per tablet     predniSONE (DELTASONE) 5 MG tablet     triamcinolone (KENALOG) 0.1 % cream     triamterene (DYRENIUM) 50 MG capsule     No current facility-administered medications for this visit.         Allergies   Allergen Reactions     Seasonal Allergies Other (See Comments)     Runny nose, eyes, difficulty breathing         Review of Systems:   A 10 point review of systems was negative beyond that stated above in the HPI.       Physical exam:   There were no vitals taken for this visit.   GENERAL:  A well-appearing  female appearing her stated age, affect normal, cooperative with the exam, alert and oriented x3.     SKIN:  A focused examination of the scalp, face, neck, hands, nails and back is performed.   No longer with alopecic patches. Mild thinning over the vertex. Negative hair pull test. Recent hairdye noted. Eyelid/lash hair approaching normal limits.  No other lesions noted today.  See photos.         Impression/Plan:   1.  Alopecia areata, rapidly progressive with secondary folliculitis, now much improved.  History of JRA/ROSARIO. Both conditions significantly improved on tofacitinib, which she has been on since approximately 05/2017. Patient stopped this medication in late spring of 2018 and did well for several weeks, but ultimately AA process restarted with significant loss of hair (joint sx worsening as well).  Restarted tofacitinib 5mg BID in August 2018, which she has tolerated this well without any significant side effects.  Again, we counseled her on the risks and benefits of the treatment approaches.No longer with patchy alopecic disease today; reassuring. No indication for additional ILK at this time. JRA is  well controlled on Tofacitinib monotherapy; follows with Dr. Pardo.  Plan today:   - Continue tofacitinib 5 mg b.i.d.  Pending safety labs today (CBC, CMP, lipid panel, UA and quant gold today).  - Safety labs to be repeated at f/u visit in 3 months   - Continue with ketoconazole shampoo approximately 2-3 times weekly to the scalp.   - Okay to restart minoxidil 5% foam, double dose once daily to the affected scalp if mild thinning becomes bothersome to her.   - Appreciate Rheumatology assistance; follows with Dr. Pardo.   -  All questions were answered to her apparent satisfaction. Handout provided today.       The patient was seen and discussed with Dr. Stella Batres MD who was staff for this encounter.     Follow up in 3 months with Dr. Marcia Navarrete MD, or sooner PRN.     Resident:  MD Edmund Erwin MD  PGY3 Dermatology  [phone]    .I, Stella Batres MD, saw this patient with the resident and agree with the resident s findings and plan of care as documented in the resident s note.

## 2019-05-16 NOTE — TELEPHONE ENCOUNTER
PA Initiation    Medication: Xeljanz 5mg tablets  Insurance Company: [employer] - Phone [phone] Fax [phone]  Pharmacy Filling the Rx: [facility] - [place], [place] - [address] [facility]  Filling Pharmacy Phone: [phone]  Filling Pharmacy Fax:    Start Date: 5/16/2019    ** Updated prior auth denial needed for free drug; pt on Xeljanz thru pt assistance program thru

## 2019-05-16 NOTE — PATIENT INSTRUCTIONS
Labs today.     Will see you back in ~3 months with Dr. Navarrete.     Continue current treatment for now.

## 2019-05-16 NOTE — NURSING NOTE
Dermatology Rooming Note    Eliane Lockhart's goals for this visit include:   Chief Complaint   Patient presents with     Derm Problem     Hairloss follow up, Amanda notes her is about the same but is happy with her results.      Madie Paiz LPN

## 2019-05-17 LAB
GAMMA INTERFERON BACKGROUND BLD IA-ACNC: 0.02 IU/ML
M TB IFN-G BLD-IMP: NEGATIVE
M TB IFN-G CD4+ BCKGRND COR BLD-ACNC: >10 IU/ML
MITOGEN IGNF BCKGRD COR BLD-ACNC: 0 IU/ML
MITOGEN IGNF BCKGRD COR BLD-ACNC: 0.01 IU/ML

## 2019-05-17 NOTE — TELEPHONE ENCOUNTER
Prior Authorization Approval    Authorization Effective Date: 4/16/2019  Authorization Expiration Date: 5/16/2020  Medication: Xeljanz 5mg tablets  Approved Dose/Quantity: 5mg twice daily  Reference #: [facility] key# [id number]   Insurance Company: [employer] - Phone [phone] Fax [phone]  Expected CoPay:       CoPay Card Available: Yes    Foundation Assistance Needed:    Which Pharmacy is filling the prescription (Not needed for infusion/clinic administered): [facility] - [place], [place] - [address]  Pharmacy Notified: Yes  Patient Notified: Yes    ** Pt was on Xeljanz thru free drug but now insurance approval so sending to [facility]

## 2019-05-21 ENCOUNTER — DOCUMENTATION ONLY (OUTPATIENT)
Dept: CARE COORDINATION | Facility: CLINIC | Age: 59
End: 2019-05-21

## 2019-05-22 NOTE — TELEPHONE ENCOUNTER
Pt's insurance has a deductible accumulator plan where pt would be responsible for full out of pocket and copay card would eventually max out. Pt unable to afford this. Will continue on free drug thru  as pt is already approved thru end of 2019.

## 2019-06-07 NOTE — TELEPHONE ENCOUNTER
RECORDS RECEIVED FROM: Pain in left foot   DATE RECEIVED: 6.14.19   NOTES STATUS DETAILS   OFFICE NOTE from referring provider Internal 3.20.19 Dr. Black   OFFICE NOTE from other specialist Care Everywhere 10.1.13 Dr. Suresh   DISCHARGE SUMMARY from hospital N/A    DISCHARGE REPORT from the ER N/A    OPERATIVE REPORT N/A    MEDICATION LIST Internal    IMPLANT RECORD/STICKER N/A    LABS     CBC/DIFF Internal 5.16.19, 3.20.19, 1.10.19,  More in Epic   CULTURES N/A    INJECTIONS DONE IN RADIOLOGY N/A    MRI N/A    CT SCAN N/A    XRAYS (IMAGES & REPORTS) In Pacs 3.20.19, 10.1.13   TUMOR     PATHOLOGY  Slides & report N/A      Action    Action Taken Left message and faxed request to [facility] to push image from 10.1.13 to Pacs. 6.7.19 MARGO

## 2019-06-11 ASSESSMENT — ENCOUNTER SYMPTOMS
EYE IRRITATION: 1
DOUBLE VISION: 0
EYE WATERING: 0
EYE REDNESS: 0
EYE PAIN: 0

## 2019-06-13 NOTE — PROGRESS NOTES
Date of Service: 6/14/2019    Chief Complaint   Patient presents with     Left Foot - Pain          HPI: Eliane is a 59 year old female who presents today for further evaluation of left foot pain. She has a long hx of pain and swelling in the left foot. Has had swelling in the foot since a  Child. She got progressive joint involvement as she matured. In 2017, dermatology added tofacitinib for alopecia. Since starting this medication, she has had a decrease in the pain and swelling in the foot. Endorses morning pain and stiffness. Relates that she does have pain some pain in the foot after walking and standing all day, however she relates that this is improving since she has lost weight and incorporated more exercise in her life.  Rheumatology has dx'ed seronegative inflammatory arthritis. She relates pain in the balls of the foot on the bottom.     Review of Systems: Answers for HPI/ROS submitted by the patient on 6/11/2019   General Symptoms: No  Skin Symptoms: No  HENT Symptoms: No  EYE SYMPTOMS: Yes  HEART SYMPTOMS: No  LUNG SYMPTOMS: No  INTESTINAL SYMPTOMS: No  URINARY SYMPTOMS: No  GYNECOLOGIC SYMPTOMS: No  BREAST SYMPTOMS: No  SKELETAL SYMPTOMS: No  BLOOD SYMPTOMS: No  NERVOUS SYSTEM SYMPTOMS: No  MENTAL HEALTH SYMPTOMS: No  Eye pain: No  Vision loss: No  Dry eyes: Yes  Watery eyes: No  Eye bulging: No  Double vision: No  Flashing of lights: No  Spots: No  Floaters: No  Redness: No  Crossed eyes: No  Tunnel Vision: No  Yellowing of eyes: No  Eye irritation: Yes      PMH: No past medical history on file.    PSxH: No past surgical history on file.    Allergies: Seasonal allergies    SH:   Social History     Socioeconomic History     Marital status:      Spouse name: Not on file     Number of children: Not on file     Years of education: Not on file     Highest education level: Not on file   Occupational History     Not on file   Social Needs     Financial resource strain: Not on file     Food  insecurity:     Worry: Not on file     Inability: Not on file     Transportation needs:     Medical: Not on file     Non-medical: Not on file   Tobacco Use     Smoking status: Never Smoker     Smokeless tobacco: Never Used   Substance and Sexual Activity     Alcohol use: Not on file     Drug use: Not on file     Sexual activity: Not on file   Lifestyle     Physical activity:     Days per week: Not on file     Minutes per session: Not on file     Stress: Not on file   Relationships     Social connections:     Talks on phone: Not on file     Gets together: Not on file     Attends Anabaptism service: Not on file     Active member of club or organization: Not on file     Attends meetings of clubs or organizations: Not on file     Relationship status: Not on file     Intimate partner violence:     Fear of current or ex partner: Not on file     Emotionally abused: Not on file     Physically abused: Not on file     Forced sexual activity: Not on file   Other Topics Concern     Parent/sibling w/ CABG, MI or angioplasty before 65F 55M? Not Asked   Social History Narrative     Not on file       FH:   Family History   Problem Relation Age of Onset     Melanoma No family hx of      Skin Cancer No family hx of        Objective:      PT and DP pulses are 2/4 bilaterally. CRT is 3 seconds. Positive pedal hair.   Gross sensation is intact bilaterally.   Equinus is mild bilaterally.  His range of motion noted to all MTPJ's on the left foot.  She does have some mild tenderness noted with palpation of the dorsal aspect of the left second MTPJ.  Osteophytes are noted with palpation of this joint and are on the dorsal, medial, and lateral aspects of the joint.  No pain noted with first, third, fourth, fifth MTPJ range of motion.  Normal range of motion to the joints on the right foot.  Rectus foot type with minimal hammering of the lesser digits bilaterally.  Nails normal bilaterally. No open lesions are noted.     No x-rays indicated  during today's visit  Previous films were reviewed today, independent visualization of images was performed, and results were discussed with the patient  Little interval change to the left 2nd met head between studies.     Assessment: Amanda is a 60 YO with inflammatory arthritis with involvement of the left MTPJs. Left 2nd MTPJ has had a flattened met head for decades. She describes the pain as minimal, and is not keen on treating it, as she is having a reduction of pain on her current RA med and with incorporation of exercise. I discussed that possible options would be to try an orthotic with a left 2nd met head cutout or XR guided injection into the joint. She would like to defer treatments.     Plan:  - Pt seen and evaluated.  - Previous XRs were reviewed.  - I discussed tx options with her as above. She would like to defer treatment, as she has a decrease in her pain.  - She should work on keeping her remaining ROM to the MTPJs. Picking up small items with toes is helpful.  - See again PRN.

## 2019-06-14 ENCOUNTER — PRE VISIT (OUTPATIENT)
Dept: ORTHOPEDICS | Facility: CLINIC | Age: 59
End: 2019-06-14

## 2019-06-14 ENCOUNTER — OFFICE VISIT (OUTPATIENT)
Dept: ORTHOPEDICS | Facility: CLINIC | Age: 59
End: 2019-06-14
Payer: COMMERCIAL

## 2019-06-14 VITALS — HEIGHT: 64 IN | BODY MASS INDEX: 35.85 KG/M2 | WEIGHT: 210 LBS

## 2019-06-14 DIAGNOSIS — M19.90 INFLAMMATORY ARTHRITIS: Primary | ICD-10-CM

## 2019-06-14 DIAGNOSIS — Z79.899 ENCOUNTER FOR LONG-TERM (CURRENT) USE OF HIGH-RISK MEDICATION: ICD-10-CM

## 2019-06-14 LAB
ALBUMIN SERPL-MCNC: 3.7 G/DL (ref 3.4–5)
ALBUMIN UR-MCNC: NEGATIVE MG/DL
ALP SERPL-CCNC: 105 U/L (ref 40–150)
ALT SERPL W P-5'-P-CCNC: 45 U/L (ref 0–50)
ANION GAP SERPL CALCULATED.3IONS-SCNC: 6 MMOL/L (ref 3–14)
APPEARANCE UR: CLEAR
AST SERPL W P-5'-P-CCNC: 27 U/L (ref 0–45)
BASOPHILS # BLD AUTO: 0.1 10E9/L (ref 0–0.2)
BASOPHILS NFR BLD AUTO: 0.9 %
BILIRUB DIRECT SERPL-MCNC: 0.2 MG/DL (ref 0–0.2)
BILIRUB SERPL-MCNC: 0.9 MG/DL (ref 0.2–1.3)
BILIRUB UR QL STRIP: NEGATIVE
BUN SERPL-MCNC: 19 MG/DL (ref 7–30)
CALCIUM SERPL-MCNC: 8.8 MG/DL (ref 8.5–10.1)
CHLORIDE SERPL-SCNC: 109 MMOL/L (ref 94–109)
CHOLEST SERPL-MCNC: 160 MG/DL
CO2 SERPL-SCNC: 24 MMOL/L (ref 20–32)
COLOR UR AUTO: YELLOW
CREAT SERPL-MCNC: 0.72 MG/DL (ref 0.52–1.04)
DIFFERENTIAL METHOD BLD: NORMAL
EOSINOPHIL # BLD AUTO: 0.2 10E9/L (ref 0–0.7)
EOSINOPHIL NFR BLD AUTO: 2.9 %
ERYTHROCYTE [DISTWIDTH] IN BLOOD BY AUTOMATED COUNT: 12.5 % (ref 10–15)
GFR SERPL CREATININE-BSD FRML MDRD: >90 ML/MIN/{1.73_M2}
GLUCOSE SERPL-MCNC: 92 MG/DL (ref 70–99)
GLUCOSE UR STRIP-MCNC: NEGATIVE MG/DL
HCT VFR BLD AUTO: 41.3 % (ref 35–47)
HDLC SERPL-MCNC: 73 MG/DL
HGB BLD-MCNC: 13.6 G/DL (ref 11.7–15.7)
HGB UR QL STRIP: ABNORMAL
IMM GRANULOCYTES # BLD: 0 10E9/L (ref 0–0.4)
IMM GRANULOCYTES NFR BLD: 0.1 %
KETONES UR STRIP-MCNC: NEGATIVE MG/DL
LDLC SERPL CALC-MCNC: 75 MG/DL
LEUKOCYTE ESTERASE UR QL STRIP: ABNORMAL
LYMPHOCYTES # BLD AUTO: 1.7 10E9/L (ref 0.8–5.3)
LYMPHOCYTES NFR BLD AUTO: 23.6 %
MCH RBC QN AUTO: 31.1 PG (ref 26.5–33)
MCHC RBC AUTO-ENTMCNC: 32.9 G/DL (ref 31.5–36.5)
MCV RBC AUTO: 94 FL (ref 78–100)
MONOCYTES # BLD AUTO: 0.4 10E9/L (ref 0–1.3)
MONOCYTES NFR BLD AUTO: 6.3 %
MUCOUS THREADS #/AREA URNS LPF: PRESENT /LPF
NEUTROPHILS # BLD AUTO: 4.6 10E9/L (ref 1.6–8.3)
NEUTROPHILS NFR BLD AUTO: 66.2 %
NITRATE UR QL: NEGATIVE
NONHDLC SERPL-MCNC: 87 MG/DL
NRBC # BLD AUTO: 0 10*3/UL
NRBC BLD AUTO-RTO: 0 /100
PH UR STRIP: 5 PH (ref 5–7)
PLATELET # BLD AUTO: 259 10E9/L (ref 150–450)
POTASSIUM SERPL-SCNC: 3.8 MMOL/L (ref 3.4–5.3)
PROT SERPL-MCNC: 7.1 G/DL (ref 6.8–8.8)
RBC # BLD AUTO: 4.38 10E12/L (ref 3.8–5.2)
RBC #/AREA URNS AUTO: 2 /HPF (ref 0–2)
SODIUM SERPL-SCNC: 139 MMOL/L (ref 133–144)
SOURCE: ABNORMAL
SP GR UR STRIP: 1.01 (ref 1–1.03)
TRIGL SERPL-MCNC: 61 MG/DL
UROBILINOGEN UR STRIP-MCNC: 0 MG/DL (ref 0–2)
WBC # BLD AUTO: 7 10E9/L (ref 4–11)
WBC #/AREA URNS AUTO: 3 /HPF (ref 0–5)

## 2019-06-14 ASSESSMENT — MIFFLIN-ST. JEOR: SCORE: 1517.55

## 2019-06-14 ASSESSMENT — PAIN SCALES - GENERAL: PAINLEVEL: SEVERE PAIN (6)

## 2019-06-14 NOTE — LETTER
6/14/2019       RE: Eliane Lockhart  [address]  [facility] [address]     Dear Colleague,    Thank you for referring your patient, Eliane Lockhart, to the [facility]. Please see a copy of my visit note below.    Date of Service: 6/14/2019    Chief Complaint   Patient presents with     Left Foot - Pain        HPI: Eliane is a 59 year old female who presents today for further evaluation of left foot pain. She has a long hx of pain and swelling in the left foot. Has had swelling in the foot since a  Child. She got progressive joint involvement as she matured. In 2017, dermatology added tofacitinib for alopecia. Since starting this medication, she has had a decrease in the pain and swelling in the foot. Endorses morning pain and stiffness. Relates that she does have pain some pain in the foot after walking and standing all day, however she relates that this is improving since she has lost weight and incorporated more exercise in her life.  Rheumatology has dx'ed seronegative inflammatory arthritis. She relates pain in the balls of the foot on the bottom.     PMH: No past medical history on file.    PSxH: No past surgical history on file.    Allergies: Seasonal allergies    SH:   Social History     Socioeconomic History     Marital status:      Spouse name: Not on file     Number of children: Not on file     Years of education: Not on file     Highest education level: Not on file   Occupational History     Not on file   Social Needs     Financial resource strain: Not on file     Food insecurity:     Worry: Not on file     Inability: Not on file     Transportation needs:     Medical: Not on file     Non-medical: Not on file   Tobacco Use     Smoking status: Never Smoker     Smokeless tobacco: Never Used   Substance and Sexual Activity     Alcohol use: Not on file     Drug use: Not on file     Sexual activity: Not on file   Lifestyle      Physical activity:     Days per week: Not on file     Minutes per session: Not on file     Stress: Not on file   Relationships     Social connections:     Talks on phone: Not on file     Gets together: Not on file     Attends Islam service: Not on file     Active member of club or organization: Not on file     Attends meetings of clubs or organizations: Not on file     Relationship status: Not on file     Intimate partner violence:     Fear of current or ex partner: Not on file     Emotionally abused: Not on file     Physically abused: Not on file     Forced sexual activity: Not on file   Other Topics Concern     Parent/sibling w/ CABG, MI or angioplasty before 65F 55M? Not Asked   Social History Narrative     Not on file       FH:   Family History   Problem Relation Age of Onset     Melanoma No family hx of      Skin Cancer No family hx of        Objective:    PT and DP pulses are 2/4 bilaterally. CRT is 3 seconds. Positive pedal hair.   Gross sensation is intact bilaterally.   Equinus is mild bilaterally.  His range of motion noted to all MTPJ's on the left foot.  She does have some mild tenderness noted with palpation of the dorsal aspect of the left second MTPJ.  Osteophytes are noted with palpation of this joint and are on the dorsal, medial, and lateral aspects of the joint.  No pain noted with first, third, fourth, fifth MTPJ range of motion.  Normal range of motion to the joints on the right foot.  Rectus foot type with minimal hammering of the lesser digits bilaterally.  Nails normal bilaterally. No open lesions are noted.     No x-rays indicated during today's visit  Previous films were reviewed today, independent visualization of images was performed, and results were discussed with the patient  Little interval change to the left 2nd met head between studies.     Assessment: Amanda is a 60 YO with inflammatory arthritis with involvement of the left MTPJs. Left 2nd MTPJ has had a flattened met head  for decades. She describes the pain as minimal, and is not keen on treating it, as she is having a reduction of pain on her current RA med and with incorporation of exercise. I discussed that possible options would be to try an orthotic with a left 2nd met head cutout or XR guided injection into the joint. She would like to defer treatments.     Plan:  - Pt seen and evaluated.  - Previous XRs were reviewed.  - I discussed tx options with her as above. She would like to defer treatment, as she has a decrease in her pain.  - She should work on keeping her remaining ROM to the MTPJs. Picking up small items with toes is helpful.  - See again PRN.       Again, thank you for allowing me to participate in the care of your patient.      Sincerely,    Kee Garza DPM

## 2019-06-14 NOTE — NURSING NOTE
"Reason For Visit:   Chief Complaint   Patient presents with     Left Foot - Pain       Primary MD: Madai Elizondo  Ref. MD: Edd Black    ?  No  Occupation .  Currently working? Yes.  Work status?  Full time.  Date of injury: [facility]  Type of injury: possible when she was a child due to a medication in the 60's.  Smoker: No  Request smoking cessation information: No    Ht 1.626 m (5' 4\")   Wt 95.3 kg (210 lb)   BMI 36.05 kg/m      Pain Assessment  Patient Currently in Pain: No                                                   Tali Cruz CMA                                                "

## 2019-08-29 ENCOUNTER — OFFICE VISIT (OUTPATIENT)
Dept: DERMATOLOGY | Facility: CLINIC | Age: 59
End: 2019-08-29
Payer: COMMERCIAL

## 2019-08-29 VITALS — HEART RATE: 57 BPM | SYSTOLIC BLOOD PRESSURE: 97 MMHG | DIASTOLIC BLOOD PRESSURE: 66 MMHG

## 2019-08-29 DIAGNOSIS — Z79.899 ENCOUNTER FOR LONG-TERM (CURRENT) USE OF HIGH-RISK MEDICATION: ICD-10-CM

## 2019-08-29 DIAGNOSIS — L63.9 ALOPECIA AREATA: Primary | ICD-10-CM

## 2019-08-29 DIAGNOSIS — Z79.622 LONG-TERM CURRENT USE OF TOFACITINIB: ICD-10-CM

## 2019-08-29 LAB
ALBUMIN SERPL-MCNC: 3.9 G/DL (ref 3.4–5)
ALBUMIN UR-MCNC: NEGATIVE MG/DL
ALP SERPL-CCNC: 104 U/L (ref 40–150)
ALT SERPL W P-5'-P-CCNC: 51 U/L (ref 0–50)
ANION GAP SERPL CALCULATED.3IONS-SCNC: 5 MMOL/L (ref 3–14)
APPEARANCE UR: CLEAR
AST SERPL W P-5'-P-CCNC: 31 U/L (ref 0–45)
BASOPHILS # BLD AUTO: 0 10E9/L (ref 0–0.2)
BASOPHILS NFR BLD AUTO: 0.2 %
BILIRUB SERPL-MCNC: 0.9 MG/DL (ref 0.2–1.3)
BILIRUB UR QL STRIP: NEGATIVE
BUN SERPL-MCNC: 28 MG/DL (ref 7–30)
CALCIUM SERPL-MCNC: 9 MG/DL (ref 8.5–10.1)
CHLORIDE SERPL-SCNC: 108 MMOL/L (ref 94–109)
CHOLEST SERPL-MCNC: 174 MG/DL
CO2 SERPL-SCNC: 27 MMOL/L (ref 20–32)
COLOR UR AUTO: YELLOW
CREAT SERPL-MCNC: 0.8 MG/DL (ref 0.52–1.04)
DIFFERENTIAL METHOD BLD: NORMAL
EOSINOPHIL # BLD AUTO: 0 10E9/L (ref 0–0.7)
EOSINOPHIL NFR BLD AUTO: 0.3 %
ERYTHROCYTE [DISTWIDTH] IN BLOOD BY AUTOMATED COUNT: 12.4 % (ref 10–15)
GFR SERPL CREATININE-BSD FRML MDRD: 81 ML/MIN/{1.73_M2}
GLUCOSE SERPL-MCNC: 88 MG/DL (ref 70–99)
GLUCOSE UR STRIP-MCNC: NEGATIVE MG/DL
HCT VFR BLD AUTO: 40.8 % (ref 35–47)
HDLC SERPL-MCNC: 83 MG/DL
HGB BLD-MCNC: 13.3 G/DL (ref 11.7–15.7)
HGB UR QL STRIP: NEGATIVE
IMM GRANULOCYTES # BLD: 0 10E9/L (ref 0–0.4)
IMM GRANULOCYTES NFR BLD: 0.4 %
KETONES UR STRIP-MCNC: NEGATIVE MG/DL
LDLC SERPL CALC-MCNC: 78 MG/DL
LEUKOCYTE ESTERASE UR QL STRIP: ABNORMAL
LYMPHOCYTES # BLD AUTO: 2.3 10E9/L (ref 0.8–5.3)
LYMPHOCYTES NFR BLD AUTO: 22.1 %
MCH RBC QN AUTO: 31.2 PG (ref 26.5–33)
MCHC RBC AUTO-ENTMCNC: 32.6 G/DL (ref 31.5–36.5)
MCV RBC AUTO: 96 FL (ref 78–100)
MONOCYTES # BLD AUTO: 0.6 10E9/L (ref 0–1.3)
MONOCYTES NFR BLD AUTO: 5.5 %
NEUTROPHILS # BLD AUTO: 7.3 10E9/L (ref 1.6–8.3)
NEUTROPHILS NFR BLD AUTO: 71.5 %
NITRATE UR QL: NEGATIVE
NONHDLC SERPL-MCNC: 90 MG/DL
NRBC # BLD AUTO: 0 10*3/UL
NRBC BLD AUTO-RTO: 0 /100
PH UR STRIP: 6 PH (ref 5–7)
PLATELET # BLD AUTO: 281 10E9/L (ref 150–450)
POTASSIUM SERPL-SCNC: 3.9 MMOL/L (ref 3.4–5.3)
PROT SERPL-MCNC: 7.4 G/DL (ref 6.8–8.8)
RBC # BLD AUTO: 4.26 10E12/L (ref 3.8–5.2)
RBC #/AREA URNS AUTO: 2 /HPF (ref 0–2)
SODIUM SERPL-SCNC: 140 MMOL/L (ref 133–144)
SOURCE: ABNORMAL
SP GR UR STRIP: 1.02 (ref 1–1.03)
TRIGL SERPL-MCNC: 60 MG/DL
UROBILINOGEN UR STRIP-MCNC: 0 MG/DL (ref 0–2)
WBC # BLD AUTO: 10.3 10E9/L (ref 4–11)
WBC #/AREA URNS AUTO: 2 /HPF (ref 0–5)

## 2019-08-29 RX ORDER — KETOCONAZOLE 20 MG/ML
SHAMPOO TOPICAL
Qty: 120 ML | Refills: 11 | Status: SHIPPED | OUTPATIENT
Start: 2019-08-29 | End: 2020-12-08

## 2019-08-29 ASSESSMENT — PAIN SCALES - GENERAL: PAINLEVEL: NO PAIN (0)

## 2019-08-29 NOTE — PROGRESS NOTES
[facility] Dermatology Note      Dermatology Problem List:   1.  Alopecia areata, rapidly progressive, onset 10/2010 with scalp folliculitis. Currently on oral tofacitinib 5mg BID.  Repeat ILK 12/21/2017 and 3/22/2018.   2.  ROSARIO/JRA, improved with above. Follows with Dr. Pardo with Rheumatology; managed with tofacitinib monotherapy at this point.   3.  Mild dermatitis, ears, triamcinolone cream.       Encounter Date:  8/29/19     CC: Patient presents with:  Derm Problem: Amanda is here for hairloss follow up.       History of Present Illness:   Eliane Lockhart is a pleasant, 59-year-old woman who presents today in followup for alopecia areata and ROSARIO/JRA.  She was last seen 5/16/19 at which time she was continued on tofacitinib. Since the last visit she has done quite well. She continues on tofacitinib at 5 mg BID. Continues on this medication today without any issues or side effects. She has been flakier recently and so has been using ketoconazole shampoo and head and shoulders more often. Notes that her scalp hair has continued to thicken. Needing hair cuts recently. Notes she is shaving regularly on the legs. No new scalp, face or body hair loss. No shedding. Joint symptoms significantly improved on medication. Now following with Dr. Pardo with Rheum; initial consult 3/20/19. She denies significant infectious symptoms, including fevers, chills, sweats, unexplained weight loss, new lumps or bumps, respiratory symptoms, GI symptoms, among others.      Past Medical History:    JRA/ROSARIO, treated with methotrexate in the distant past, now with NSAIDs and improved with tofacitinib as above, obesity, hyperlipidemia, asthma requiring multiple inhalers and intermittent prednisone.      Family History:   Daughter with atopic dermatitis; otherwise, no chronic skin or autoimmune conditions.        Current Outpatient Medications   Medication     ketoconazole (NIZORAL) 2 % external shampoo     albuterol  (2.5 MG/3ML) 0.083% neb solution     albuterol (VENTOLIN HFA) 108 (90 BASE) MCG/ACT Inhaler     fluticasone-salmeterol (ADVAIR DISKUS) 250-50 MCG/DOSE diskus inhaler     gentamicin (GARAMYCIN) 0.1 % cream     HYDROcodone-acetaminophen (NORCO) 5-325 MG per tablet     montelukast (SINGULAIR) 10 MG tablet     predniSONE (DELTASONE) 5 MG tablet     simvastatin (ZOCOR) 80 MG tablet     tofacitinib (XELJANZ) 5 MG tablet     triamcinolone (KENALOG) 0.1 % cream     triamterene (DYRENIUM) 50 MG capsule     No current facility-administered medications for this visit.         Allergies   Allergen Reactions     Seasonal Allergies Other (See Comments)     Runny nose, eyes, difficulty breathing         Review of Systems:   A 10 point review of systems was negative beyond that stated above in the HPI.       Physical exam:   BP 97/66 (BP Location: Right arm, Patient Position: Sitting, Cuff Size: Adult Regular)   Pulse 57    GENERAL:  A well-appearing  female appearing her stated age, affect normal, cooperative with the exam, alert and oriented x3.     SKIN:  A focused examination of the scalp, face, neck, hands, nails and back is performed.     - No longer with alopecic patches  - Mild thinning over the vertex  - Negative hair pull test  - Eyelid/lash hair WNL        Impression/Plan:   1.  Alopecia areata, rapidly progressive with secondary folliculitis, now much improved.  History of JRA/ROSARIO. Both conditions significantly improved on tofacitinib, which she has been on since approximately 05/2017. Patient stopped this medication in late spring of 2018 and did well for several weeks, but ultimately AA process restarted with significant loss of hair (joint sx worsening as well).  Restarted tofacitinib 5mg BID in August 2018, which she has tolerated this well without any significant side effects.  Again, we counseled her on the risks and benefits of the treatment approaches. No longer with patchy alopecic disease today;  reassuring. No indication for additional ILK at this time. JRA is well controlled on tofacitinib monotherapy; follows with Dr. Pardo.  Plan today:   - Continue tofacitinib 5 mg BID.  Pending safety labs today (CBC, CMP, lipid panel, UA today).  - Safety labs to be repeated at f/u visit in 3 months   - Continue with ketoconazole shampoo approximately 2-3 times weekly to the scalp.   - okay to restart minoxidil 5% foam, double dose once daily to the affected scalp if mild thinning becomes bothersome to her.   - Appreciate Rheumatology recommendations; follows with Dr. Pardo.     The patient was seen and discussed with Dr. Dwyer who was staff for this encounter.     Follow up in 3 months.    Staff involved:  Resident(Marcia Navarrete)/Staff(as above)    Marcia Navarrete M.D.  PGY-4 Resident  Dermatology  [facility]    Staff Physician Comments:   I saw and evaluated the patient with the resident and I edited the assessment and plan as documented in the note. I was present for the examination.    Juan Dwyer MD   of Dermatology  Department of Dermatology  [facility] [employer] [facility]

## 2019-08-29 NOTE — NURSING NOTE
Dermatology Rooming Note    Eliane Lockhart's goals for this visit include:   Chief Complaint   Patient presents with     Derm Problem     Amanda is here for hairloss follow up.      Grace Marmolejo LPN

## 2019-08-29 NOTE — LETTER
8/29/2019       RE: Eliane Lockhart  [address]  [facility] [address]     Dear Colleague,    Thank you for referring your patient, Eliane Lockhart, to the [facility] DERMATOLOGY at [facility]. Please see a copy of my visit note below.    [facility] Dermatology Note      Dermatology Problem List:   1.  Alopecia areata, rapidly progressive, onset 10/2010 with scalp folliculitis. Currently on oral tofacitinib 5mg BID.  Repeat ILK 12/21/2017 and 3/22/2018.   2.  ROSARIO/JRA, improved with above. Follows with Dr. Pardo with Rheumatology; managed with tofacitinib monotherapy at this point.   3.  Mild dermatitis, ears, triamcinolone cream.       Encounter Date:  8/29/19     CC: Patient presents with:  Derm Problem: Amanda is here for hairloss follow up.       History of Present Illness:   Eliane Lockhart is a pleasant, 59-year-old woman who presents today in followup for alopecia areata and ROSARIO/JRA.  She was last seen 5/16/19 at which time she was continued on tofacitinib. Since the last visit she has done quite well. She continues on tofacitinib at 5 mg BID. Continues on this medication today without any issues or side effects. She has been flakier recently and so has been using ketoconazole shampoo and head and shoulders more often. Notes that her scalp hair has continued to thicken. Needing hair cuts recently. Notes she is shaving regularly on the legs. No new scalp, face or body hair loss. No shedding. Joint symptoms significantly improved on medication. Now following with Dr. Pardo with Rheum; initial consult 3/20/19. She denies significant infectious symptoms, including fevers, chills, sweats, unexplained weight loss, new lumps or bumps, respiratory symptoms, GI symptoms, among others.      Past Medical History:    JRA/ROSARIO, treated with methotrexate in the distant past, now with NSAIDs and improved with tofacitinib as above, obesity,  hyperlipidemia, asthma requiring multiple inhalers and intermittent prednisone.      Family History:   Daughter with atopic dermatitis; otherwise, no chronic skin or autoimmune conditions.        Current Outpatient Medications   Medication     ketoconazole (NIZORAL) 2 % external shampoo     albuterol (2.5 MG/3ML) 0.083% neb solution     albuterol (VENTOLIN HFA) 108 (90 BASE) MCG/ACT Inhaler     fluticasone-salmeterol (ADVAIR DISKUS) 250-50 MCG/DOSE diskus inhaler     gentamicin (GARAMYCIN) 0.1 % cream     HYDROcodone-acetaminophen (NORCO) 5-325 MG per tablet     montelukast (SINGULAIR) 10 MG tablet     predniSONE (DELTASONE) 5 MG tablet     simvastatin (ZOCOR) 80 MG tablet     tofacitinib (XELJANZ) 5 MG tablet     triamcinolone (KENALOG) 0.1 % cream     triamterene (DYRENIUM) 50 MG capsule     No current facility-administered medications for this visit.         Allergies   Allergen Reactions     Seasonal Allergies Other (See Comments)     Runny nose, eyes, difficulty breathing         Review of Systems:   A 10 point review of systems was negative beyond that stated above in the HPI.       Physical exam:   BP 97/66 (BP Location: Right arm, Patient Position: Sitting, Cuff Size: Adult Regular)   Pulse 57    GENERAL:  A well-appearing  female appearing her stated age, affect normal, cooperative with the exam, alert and oriented x3.     SKIN:  A focused examination of the scalp, face, neck, hands, nails and back is performed.     - No longer with alopecic patches  - Mild thinning over the vertex  - Negative hair pull test  - Eyelid/lash hair WNL        Impression/Plan:   1.  Alopecia areata, rapidly progressive with secondary folliculitis, now much improved.  History of JRA/ROSARIO. Both conditions significantly improved on tofacitinib, which she has been on since approximately 05/2017. Patient stopped this medication in late spring of 2018 and did well for several weeks, but ultimately AA process restarted with  significant loss of hair (joint sx worsening as well).  Restarted tofacitinib 5mg BID in August 2018, which she has tolerated this well without any significant side effects.  Again, we counseled her on the risks and benefits of the treatment approaches. No longer with patchy alopecic disease today; reassuring. No indication for additional ILK at this time. SANJEEV is well controlled on tofacitinib monotherapy; follows with Dr. Pardo.  Plan today:   - Continue tofacitinib 5 mg BID.  Pending safety labs today (CBC, CMP, lipid panel, UA today).  - Safety labs to be repeated at f/u visit in 3 months   - Continue with ketoconazole shampoo approximately 2-3 times weekly to the scalp.   - okay to restart minoxidil 5% foam, double dose once daily to the affected scalp if mild thinning becomes bothersome to her.   - Appreciate Rheumatology recommendations; follows with Dr. Pardo.     The patient was seen and discussed with Dr. Dwyer who was staff for this encounter.     Follow up in 3 months.    Staff involved:  Resident(Marcia Navarrete)/Staff(as above)    Marcia Navarrete M.D.  PGY-4 Resident  Dermatology  [facility]    Staff Physician Comments:   I saw and evaluated the patient with the resident and I edited the assessment and plan as documented in the note. I was present for the examination.    Juan Dwyer MD   of Dermatology  Department of Dermatology  [facility] [employer] [facility]

## 2019-09-19 NOTE — RESULT ENCOUNTER NOTE
Informed patient of lab results via [employer]. UA, CBC, CMP, and lipid panel checked for tofacitinib monitoring. All WNL. Continue xeljanz.

## 2019-10-24 ENCOUNTER — TELEPHONE (OUTPATIENT)
Dept: DERMATOLOGY | Facility: CLINIC | Age: 59
End: 2019-10-24

## 2019-10-25 NOTE — TELEPHONE ENCOUNTER
Rec'd faxed request from [employer]/ [employer] to renew pt's Xeljanz free drug su renewal for 2020. Will mail pt her portion for completion. Working with provider to sign all renewal apps and then writer will submit via fax.    [email or web address]

## 2019-11-01 ENCOUNTER — OFFICE VISIT (OUTPATIENT)
Dept: RHEUMATOLOGY | Facility: CLINIC | Age: 59
End: 2019-11-01
Attending: INTERNAL MEDICINE
Payer: COMMERCIAL

## 2019-11-01 VITALS
TEMPERATURE: 97.8 F | WEIGHT: 215 LBS | DIASTOLIC BLOOD PRESSURE: 73 MMHG | BODY MASS INDEX: 36.9 KG/M2 | OXYGEN SATURATION: 97 % | SYSTOLIC BLOOD PRESSURE: 109 MMHG | HEART RATE: 61 BPM

## 2019-11-01 DIAGNOSIS — L63.9 AA (ALOPECIA AREATA): ICD-10-CM

## 2019-11-01 PROCEDURE — G0463 HOSPITAL OUTPT CLINIC VISIT: HCPCS | Mod: ZF

## 2019-11-01 ASSESSMENT — PAIN SCALES - GENERAL: PAINLEVEL: NO PAIN (0)

## 2019-11-01 NOTE — PROGRESS NOTES
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 Continued improvement in patchy hair loss is also noted. Exam shows reduced range of motion in 2nd-5th MTPs on the left but otherwise no inflammatory changes. Blood work on 2019 showed CMP and CBC and UA entirely normal. Lipid levels were not significantly different from 2019.     I agree with Dr. Navarrete that both alopecia areata and inflammatory arthritis symptoms are dramatically improved with use of tofacitinib. I recommend continuing tofacitinib 5 mg twice daily. While on the drug, she should undergo every 6 month lipid panel and every 3-4 months CBC, creatinine, and LFTs. We reviewed potential adverse effects, including increased frequency of infection.     Follow-up: Return in about 6 months (around 2020).     HPI:   Eliane Lockhart is a 59 year old female with a history of JRA who presents for follow up. She was last seen on 2019, at which time she was continued on tofacitinib monotherapy.     She was seen by Orthopedics in 2019 for evaluation of left hip pain. Assessment was of greater trochanteric pain syndrome. Left lateral thigh injection was given that day.     Patient was seen by Dr. Navarrete in Dermatology on 2019 for alopecia areata. Improvement in scalp hair thickness was reported and continued tofacitinib was recommended.     Today, she is doing well. She reports improved pain in her hands and left forefoot. She saw podiatry and discussed orthotics vs injection but she did not feel like she required either of these. She notes that pain has improved with  weight loss and increased activity. She has about an hour of mild morning stiffness. She rarely using ibuprofen. She is tolerating xeljanz well. Prior hip pain improved with left lateral thigh injection. She has also worked with physical therapy on strengthening exercises.     Background history:   She was diagnosed with juvenile rheumatoid arthritis at age 10 when her left foot became so swollen that she could not get it in her shoes. She was on Tandaril briefly but this was subsequently taken off of the market, so she stopped the medication and also noted that aspirin was more effective. In her 20s, she developed hand pain, worse with grasping. She was followed by rheumatology for 5-6 years and was on methotrexate and plaquenil for an unknown period of time. She lost follow up due to problems with insurance. She also had some improvement in pain after some weight loss and managed pain with ibuprofen. She was then last seen in 2013, as noted above, but never followed up at the initial evaluation. Her hand pain progressed gradually since onset in her 20s.      She reports that after she was started on xeljanz by dermatology in 2017. her hand and foot pain improved substantially. Swelling over all MCPs and morning stiffness, which would previously last until about noon, also improved. She was also on prednisone at one point and has used ibuprofen, which also improved her foot and hand pain. Since starting xeljanz, she no longer requires ibuprofen. Her pain is overall about 80-90 percent improved, however she continues to have left forefoot pain, which makes it difficult to walk or stand for extended periods of time.     Review of Systems:   Pertinent items are noted in HPI or as below, remainder of complete ROS is negative.      No recent problems with hearing or vision. No swallowing problems.   No breathing difficulty, shortness of breath, coughing, or wheezing  No chest pain or palpitations  No heart burn,  indigestion, abdominal pain, nausea, vomiting, diarrhea  No urination problems, no bloody, cloudy urine, no dysuria  No numbing, tingling, weakness  No headaches or confusion  No rashes. No easy bleeding or bruising.     Active Medications:     Current Outpatient Medications:      albuterol (VENTOLIN HFA) 108 (90 BASE) MCG/ACT Inhaler, , Disp: , Rfl:      fluticasone-salmeterol (ADVAIR DISKUS) 250-50 MCG/DOSE diskus inhaler, , Disp: , Rfl:      gentamicin (GARAMYCIN) 0.1 % cream, Apply topically as needed , Disp: , Rfl:      HYDROcodone-acetaminophen (NORCO) 5-325 MG per tablet, , Disp: , Rfl:      ketoconazole (NIZORAL) 2 % external shampoo, Three times weekly: lather into scalp, leave in place for 3-5 minutes, then rinse. (Patient taking differently: daily as needed Three times weekly: lather into scalp, leave in place for 3-5 minutes, then rinse.), Disp: 120 mL, Rfl: 11     montelukast (SINGULAIR) 10 MG tablet, daily , Disp: , Rfl:      predniSONE (DELTASONE) 5 MG tablet, Take 8 tablets for 5 days, 7 tablets for 3 days, 6 tablets for 3 days, 5 tablets for 3 days, 4 tablets for 3 days, 3 tablets for 3 days, 2 tablets for 3 days, then 1 tablet for 3 days., Disp: 124 tablet, Rfl: 0     simvastatin (ZOCOR) 80 MG tablet, daily , Disp: , Rfl:      tofacitinib (XELJANZ) 5 MG tablet, Take 1 tablet (5 mg) by mouth 2 times daily, Disp: 60 tablet, Rfl: 6     triamcinolone (KENALOG) 0.1 % cream, Apply twice daily for ear rash as needed., Disp: 80 g, Rfl: 2     albuterol (2.5 MG/3ML) 0.083% neb solution, , Disp: , Rfl:      triamterene (DYRENIUM) 50 MG capsule, daily , Disp: , Rfl:       Allergies:   Seasonal allergies      Past Medical History:  Alopecia areata  Inflammatory arthritis   Dermatitis   Asthma  Glaucoma  Obesity   Hypercholesteremia       Past Surgical History:  Cataract bilateral 1996  Benign mass removed from rib cage 2000      Family History:   Father: diabetes mellitus, emphysema  Mother: diabetes mellitus    Maternal cousins x 2: breast cancer   Aunt: breast cancer   Daughter: atopic dermatitis       No family history of autoimmune disease   Multiple family members with spine surgery.      Social History:   No tobacco use.   Occasional alcohol use.      Physical Exam:   /73 (BP Location: Right arm, Patient Position: Sitting, Cuff Size: Adult Large)   Pulse 61   Temp 97.8  F (36.6  C) (Oral)   Wt 97.5 kg (215 lb)   SpO2 97%   BMI 36.90 kg/m     Wt Readings from Last 4 Encounters:   11/01/19 97.5 kg (215 lb)   06/14/19 95.3 kg (210 lb)   03/20/19 103.1 kg (227 lb 3.2 oz)     Constitutional: Well-developed, appearing stated age; cooperative  Eyes: Normal EOM, PERRLA, vision, conjunctiva, sclera  ENT: Normal external ears, nose, hearing, lips, teeth, gums, throat. No mucous membrane lesions, normal saliva pool  Neck: No mass or thyroid enlargement  Resp: Lungs clear to auscultation, nl to palpation  CV: RRR, no murmurs, rubs or gallops, no edema  GI: No ABD mass or tenderness, no HSM  : Not tested  Lymph: No cervical, supraclavicular, inguinal or epitrochlear nodes  MS: No tenderness or synovial thickening over the MTPs but there is reduced flexion in the left 2nd-5th MTPs. The TMJ, neck, shoulder, elbow, wrist, MCP/PIP/DIP, spine, hip, knee, and ankle joints were examined and found normal. No active synovitis or altered joint anatomy. Full joint ROM. Normal  strength. No dactylitis,  tenosynovitis, enthesopathy.  Skin: No nail pitting, alopecia, rash, nodules or lesions  Neuro: Normal cranial nerves, strength, sensation, DTRs.   Psych: Normal judgement, orientation, memory, affect.     Laboratory:   RHEUM RESULTS Latest Ref Rng & Units 5/16/2019 6/14/2019 8/29/2019   CRP, INFLAMMATION 0.0 - 8.0 mg/L - - -   SHAHZAD SCREEN BY EIA <1.0 - - -   AST 0 - 45 U/L 23 27 31   ALT 0 - 50 U/L 43 45 51(H)   ALBUMIN 3.4 - 5.0 g/dL 3.8 3.7 3.9   WBC 4.0 - 11.0 10e9/L 7.0 7.0 10.3   RBC 3.8 - 5.2 10e12/L 4.29 4.38 4.26    HGB 11.7 - 15.7 g/dL 13.5 13.6 13.3   HCT 35.0 - 47.0 % 40.3 41.3 40.8   MCV 78 - 100 fl 94 94 96   MCHC 31.5 - 36.5 g/dL 33.5 32.9 32.6   RDW 10.0 - 15.0 % 12.5 12.5 12.4    - 450 10e9/L 278 259 281   CREATININE 0.52 - 1.04 mg/dL 0.72 0.72 0.80   GFR ESTIMATE, IF BLACK >60 mL/min/[1.73:m2] >90 >90 >90   GFR ESTIMATE >60 mL/min/[1.73:m2] >90 >90 81   HEPATITIS C ANTIBODY NR - - -     SHAHZAD Screen by EIA   Date Value Ref Range Status   01/26/2017 1.3 (H) <1.0 Final     Comment:     Interpretation:  Positive     Hep B Surface Agn   Date Value Ref Range Status   06/29/2017 Nonreactive NR Final     Quantiferon-TB Gold Plus Result   Date Value Ref Range Status   05/16/2019 Negative NEG^Negative Final     Comment:     No interferon gamma response to M.tuberculosis antigens was detected.   Infection with M.tuberculosis is unlikely, however a single negative result   does not exclude infection. In patients at high risk for infection, a second   test should be considered  in accordance with the 2017 ATS/IDSA/CDC Clinical Practice Guidelines for   Diagnosis of Tuberculosis in Adults and Children [Lewinsohn MICHELLE et   al.Clin.Infect.Dis. 2017 64(2):111-115].       TB1 Ag minus Nil Value   Date Value Ref Range Status   05/16/2019 0.01 IU/mL Final     TB2 Ag minus Nil Value   Date Value Ref Range Status   05/16/2019 0.00 IU/mL Final     Mitogen minus Nil Result   Date Value Ref Range Status   05/16/2019 >10.00 IU/mL Final     Nil Result   Date Value Ref Range Status   05/16/2019 0.02 IU/mL Final     Scribe Disclosure:  Blanquita LAUREN, am serving as a scribe to document services personally performed by Edd Black MD at this visit, based upon the provider's statements to me. All documentation has been reviewed by the aforementioned provider prior to being entered into the official medical record.

## 2019-11-01 NOTE — NURSING NOTE
Chief Complaint   Patient presents with     RECHECK     ROSARIO       /73 (BP Location: Right arm, Patient Position: Sitting, Cuff Size: Adult Large)   Pulse 61   Temp 97.8  F (36.6  C) (Oral)   Wt 97.5 kg (215 lb)   SpO2 97%   BMI 36.90 kg/m        Sera Rosado CMA [facility]    11/1/2019 2:35 PM

## 2019-11-01 NOTE — LETTER
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 Continued improvement in patchy hair loss is also noted. Exam shows reduced range of motion in 2nd-5th MTPs on the left but otherwise no inflammatory changes. Blood work on 2019 showed CMP and CBC and UA entirely normal. Lipid levels were not significantly different from 2019.     I agree with Dr. Navarrete that both alopecia areata and inflammatory arthritis symptoms are dramatically improved with use of tofacitinib. I recommend continuing tofacitinib 5 mg twice daily. While on the drug, she should undergo every 6 month lipid panel and every 3-4 months CBC, creatinine, and LFTs. We reviewed potential adverse effects, including increased frequency of infection.     Follow-up: Return in about 6 months (around 2020).     HPI:   Eliane Lockhart is a 59 year old female with a history of JRA who presents for follow up. She was last seen on 2019, at which time she was continued on tofacitinib monotherapy.     She was seen by Orthopedics in 2019 for evaluation of left hip pain. Assessment was of greater trochanteric pain syndrome. Left lateral thigh injection was given that day.     Patient was seen by Dr. Navarrete in Dermatology on 2019 for alopecia areata. Improvement in scalp hair thickness was reported and continued tofacitinib was recommended.     Today, she is doing well. She reports improved pain in her hands and left forefoot. She saw podiatry and discussed orthotics vs injection but  she did not feel like she required either of these. She notes that pain has improved with weight loss and increased activity. She has about an hour of mild morning stiffness. She rarely using ibuprofen. She is tolerating xeljanz well. Prior hip pain improved with left lateral thigh injection. She has also worked with physical therapy on strengthening exercises.     Background history:   She was diagnosed with juvenile rheumatoid arthritis at age 10 when her left foot became so swollen that she could not get it in her shoes. She was on Tandaril briefly but this was subsequently taken off of the market, so she stopped the medication and also noted that aspirin was more effective. In her 20s, she developed hand pain, worse with grasping. She was followed by rheumatology for 5-6 years and was on methotrexate and plaquenil for an unknown period of time. She lost follow up due to problems with insurance. She also had some improvement in pain after some weight loss and managed pain with ibuprofen. She was then last seen in 2013, as noted above, but never followed up at the initial evaluation. Her hand pain progressed gradually since onset in her 20s.      She reports that after she was started on xeljanz by dermatology  in 2017. her hand and foot pain improved substantially. Swelling over all MCPs and morning stiffness, which would previously last until about noon, also improved. She was also on prednisone at one point and has used ibuprofen, which also improved her foot and hand pain. Since starting xeljanz, she no longer requires ibuprofen. Her pain is overall about 80-90 percent improved, however she continues to have left forefoot pain, which makes it difficult to walk or stand for extended periods of time.     Review of Systems:   Pertinent items are noted in HPI or as below, remainder of complete ROS is negative.      No recent problems with hearing or vision. No swallowing problems.   No breathing difficulty,  shortness of breath, coughing, or wheezing  No chest pain or palpitations  No heart burn, indigestion, abdominal pain, nausea, vomiting, diarrhea  No urination problems, no bloody, cloudy urine, no dysuria  No numbing, tingling, weakness  No headaches or confusion  No rashes. No easy bleeding or bruising.     Active Medications:     Current Outpatient Medications:      albuterol (VENTOLIN HFA) 108 (90 BASE) MCG/ACT Inhaler, , Disp: , Rfl:      fluticasone-salmeterol (ADVAIR DISKUS) 250-50 MCG/DOSE diskus inhaler, , Disp: , Rfl:      gentamicin (GARAMYCIN) 0.1 % cream, Apply topically as needed , Disp: , Rfl:      HYDROcodone-acetaminophen (NORCO) 5-325 MG per tablet, , Disp: , Rfl:      ketoconazole (NIZORAL) 2 % external shampoo, Three times weekly: lather into scalp, leave in place for 3-5 minutes, then rinse. (Patient taking differently: daily as needed Three times weekly: lather into scalp, leave in place for 3-5 minutes, then rinse.), Disp: 120 mL, Rfl: 11     montelukast (SINGULAIR) 10 MG tablet, daily , Disp: , Rfl:      predniSONE (DELTASONE) 5 MG tablet, Take 8 tablets for 5 days, 7 tablets for 3 days, 6 tablets for 3 days, 5 tablets for 3 days, 4 tablets for 3 days, 3 tablets for 3 days, 2 tablets for 3 days, then 1 tablet for 3 days., Disp: 124 tablet, Rfl: 0     simvastatin (ZOCOR) 80 MG tablet, daily , Disp: , Rfl:      tofacitinib (XELJANZ) 5 MG tablet, Take 1 tablet (5 mg) by mouth 2 times daily, Disp: 60 tablet, Rfl: 6     triamcinolone (KENALOG) 0.1 % cream, Apply twice daily for ear rash as needed., Disp: 80 g, Rfl: 2     albuterol (2.5 MG/3ML) 0.083% neb solution, , Disp: , Rfl:      triamterene (DYRENIUM) 50 MG capsule, daily , Disp: , Rfl:       Allergies:   Seasonal allergies      Past Medical History:  Alopecia areata  Inflammatory arthritis   Dermatitis   Asthma  Glaucoma  Obesity   Hypercholesteremia       Past Surgical History:  Cataract bilateral 1996  Benign mass removed from rib cage  2000      Family History:   Father: diabetes mellitus, emphysema  Mother: diabetes mellitus   Maternal cousins x 2: breast cancer   Aunt: breast cancer   Daughter: atopic dermatitis       No family history of autoimmune disease   Multiple family members with spine surgery.      Social History:   No tobacco use.   Occasional alcohol use.      Physical Exam:   /73 (BP Location: Right arm, Patient Position: Sitting, Cuff Size: Adult Large)   Pulse 61   Temp 97.8  F (36.6  C) (Oral)   Wt 97.5 kg (215 lb)   SpO2 97%   BMI 36.90 kg/m      Wt Readings from Last 4 Encounters:   11/01/19 97.5 kg (215 lb)   06/14/19 95.3 kg (210 lb)   03/20/19 103.1 kg (227 lb 3.2 oz)     Constitutional: Well-developed, appearing stated age; cooperative  Eyes: Normal EOM, PERRLA, vision, conjunctiva, sclera  ENT: Normal external ears, nose, hearing, lips, teeth, gums, throat. No mucous membrane lesions, normal saliva pool  Neck: No mass or thyroid enlargement  Resp: Lungs clear to auscultation, nl to palpation  CV: RRR, no murmurs, rubs or gallops, no edema  GI: No ABD mass or tenderness, no HSM  : Not tested  Lymph: No cervical, supraclavicular, inguinal or epitrochlear nodes  MS: No tenderness or synovial thickening over the MTPs but there is reduced flexion in the left 2nd-5th MTPs. The TMJ, neck, shoulder, elbow, wrist, MCP/PIP/DIP, spine, hip, knee, and ankle joints were examined and found normal. No active synovitis or altered joint anatomy. Full joint ROM. Normal  strength. No dactylitis,  tenosynovitis, enthesopathy.  Skin: No nail pitting, alopecia, rash, nodules or lesions  Neuro: Normal cranial nerves, strength, sensation, DTRs.   Psych: Normal judgement, orientation, memory, affect.     Laboratory:   RHEUM RESULTS Latest Ref Rng & Units 5/16/2019 6/14/2019 8/29/2019   CRP, INFLAMMATION 0.0 - 8.0 mg/L - - -   SHAHZAD SCREEN BY EIA <1.0 - - -   AST 0 - 45 U/L 23 27 31   ALT 0 - 50 U/L 43 45 51(H)   ALBUMIN 3.4 - 5.0  g/dL 3.8 3.7 3.9   WBC 4.0 - 11.0 10e9/L 7.0 7.0 10.3   RBC 3.8 - 5.2 10e12/L 4.29 4.38 4.26   HGB 11.7 - 15.7 g/dL 13.5 13.6 13.3   HCT 35.0 - 47.0 % 40.3 41.3 40.8   MCV 78 - 100 fl 94 94 96   MCHC 31.5 - 36.5 g/dL 33.5 32.9 32.6   RDW 10.0 - 15.0 % 12.5 12.5 12.4    - 450 10e9/L 278 259 281   CREATININE 0.52 - 1.04 mg/dL 0.72 0.72 0.80   GFR ESTIMATE, IF BLACK >60 mL/min/[1.73:m2] >90 >90 >90   GFR ESTIMATE >60 mL/min/[1.73:m2] >90 >90 81   HEPATITIS C ANTIBODY NR - - -     SHAHZAD Screen by EIA   Date Value Ref Range Status   01/26/2017 1.3 (H) <1.0 Final     Comment:     Interpretation:  Positive     Hep B Surface Agn   Date Value Ref Range Status   06/29/2017 Nonreactive NR Final     Quantiferon-TB Gold Plus Result   Date Value Ref Range Status   05/16/2019 Negative NEG^Negative Final     Comment:     No interferon gamma response to M.tuberculosis antigens was detected.   Infection with M.tuberculosis is unlikely, however a single negative result   does not exclude infection. In patients at high risk for infection, a second   test should be considered  in accordance with the 2017 ATS/IDSA/CDC Clinical Practice Guidelines for   Diagnosis of Tuberculosis in Adults and Children [Ellisinsohn MICHELLE et   al.Clin.Infect.Dis. 2017 64(2):111-115].       TB1 Ag minus Nil Value   Date Value Ref Range Status   05/16/2019 0.01 IU/mL Final     TB2 Ag minus Nil Value   Date Value Ref Range Status   05/16/2019 0.00 IU/mL Final     Mitogen minus Nil Result   Date Value Ref Range Status   05/16/2019 >10.00 IU/mL Final     Nil Result   Date Value Ref Range Status   05/16/2019 0.02 IU/mL Final     Scribe Disclosure:  Blanquita LAUREN, am serving as a scribe to document services personally performed by Edd Black MD at this visit, based upon the provider's statements to me. All documentation has been reviewed by the aforementioned provider prior to being entered into the official medical record.     Edd Black,  MD

## 2019-11-01 NOTE — PATIENT INSTRUCTIONS
Diagnosis: Inflammatory arthritis, improved with tofacitinib    Plan:   - Continue xeljanz 5 mg twice daily   - Repeat CBC and LFTs at your convenience this month  - Repeat blood lipid labs in 3 months

## 2019-11-05 NOTE — TELEPHONE ENCOUNTER
Spoke to pt - she submitted her portion.     Refill rx sent to [facility] by Dr. Black.

## 2019-11-21 ENCOUNTER — APPOINTMENT (OUTPATIENT)
Dept: LAB | Facility: CLINIC | Age: 59
End: 2019-11-21
Payer: COMMERCIAL

## 2019-11-21 ENCOUNTER — OFFICE VISIT (OUTPATIENT)
Dept: DERMATOLOGY | Facility: CLINIC | Age: 59
End: 2019-11-21
Payer: COMMERCIAL

## 2019-11-21 VITALS — HEART RATE: 56 BPM | DIASTOLIC BLOOD PRESSURE: 76 MMHG | SYSTOLIC BLOOD PRESSURE: 112 MMHG

## 2019-11-21 DIAGNOSIS — L63.9 ALOPECIA AREATA: ICD-10-CM

## 2019-11-21 DIAGNOSIS — Z79.622 LONG-TERM CURRENT USE OF TOFACITINIB: ICD-10-CM

## 2019-11-21 DIAGNOSIS — L60.3 ONYCHODYSTROPHY: Primary | ICD-10-CM

## 2019-11-21 LAB
ALBUMIN SERPL-MCNC: 3.8 G/DL (ref 3.4–5)
ALBUMIN UR-MCNC: NEGATIVE MG/DL
ALP SERPL-CCNC: 86 U/L (ref 40–150)
ALT SERPL W P-5'-P-CCNC: 49 U/L (ref 0–50)
ANION GAP SERPL CALCULATED.3IONS-SCNC: 4 MMOL/L (ref 3–14)
APPEARANCE UR: CLEAR
AST SERPL W P-5'-P-CCNC: 27 U/L (ref 0–45)
BASOPHILS # BLD AUTO: 0.1 10E9/L (ref 0–0.2)
BASOPHILS NFR BLD AUTO: 1.1 %
BILIRUB SERPL-MCNC: 1.2 MG/DL (ref 0.2–1.3)
BILIRUB UR QL STRIP: NEGATIVE
BUN SERPL-MCNC: 22 MG/DL (ref 7–30)
CALCIUM SERPL-MCNC: 9.2 MG/DL (ref 8.5–10.1)
CHLORIDE SERPL-SCNC: 105 MMOL/L (ref 94–109)
CHOLEST SERPL-MCNC: 183 MG/DL
CO2 SERPL-SCNC: 28 MMOL/L (ref 20–32)
COLOR UR AUTO: YELLOW
CREAT SERPL-MCNC: 0.76 MG/DL (ref 0.52–1.04)
DIFFERENTIAL METHOD BLD: NORMAL
EOSINOPHIL # BLD AUTO: 0.1 10E9/L (ref 0–0.7)
EOSINOPHIL NFR BLD AUTO: 1.8 %
ERYTHROCYTE [DISTWIDTH] IN BLOOD BY AUTOMATED COUNT: 12.1 % (ref 10–15)
GFR SERPL CREATININE-BSD FRML MDRD: 86 ML/MIN/{1.73_M2}
GLUCOSE SERPL-MCNC: 91 MG/DL (ref 70–99)
GLUCOSE UR STRIP-MCNC: NEGATIVE MG/DL
HCT VFR BLD AUTO: 41.4 % (ref 35–47)
HDLC SERPL-MCNC: 93 MG/DL
HGB BLD-MCNC: 13.5 G/DL (ref 11.7–15.7)
HGB UR QL STRIP: NEGATIVE
IMM GRANULOCYTES # BLD: 0 10E9/L (ref 0–0.4)
IMM GRANULOCYTES NFR BLD: 0.4 %
KETONES UR STRIP-MCNC: NEGATIVE MG/DL
LDLC SERPL CALC-MCNC: 77 MG/DL
LEUKOCYTE ESTERASE UR QL STRIP: NEGATIVE
LYMPHOCYTES # BLD AUTO: 2.4 10E9/L (ref 0.8–5.3)
LYMPHOCYTES NFR BLD AUTO: 32.9 %
MCH RBC QN AUTO: 31.1 PG (ref 26.5–33)
MCHC RBC AUTO-ENTMCNC: 32.6 G/DL (ref 31.5–36.5)
MCV RBC AUTO: 95 FL (ref 78–100)
MONOCYTES # BLD AUTO: 0.4 10E9/L (ref 0–1.3)
MONOCYTES NFR BLD AUTO: 5.6 %
NEUTROPHILS # BLD AUTO: 4.2 10E9/L (ref 1.6–8.3)
NEUTROPHILS NFR BLD AUTO: 58.2 %
NITRATE UR QL: NEGATIVE
NONHDLC SERPL-MCNC: 90 MG/DL
NRBC # BLD AUTO: 0 10*3/UL
NRBC BLD AUTO-RTO: 0 /100
PH UR STRIP: 6 PH (ref 5–7)
PLATELET # BLD AUTO: 295 10E9/L (ref 150–450)
POTASSIUM SERPL-SCNC: 3.6 MMOL/L (ref 3.4–5.3)
PROT SERPL-MCNC: 7.5 G/DL (ref 6.8–8.8)
RBC # BLD AUTO: 4.34 10E12/L (ref 3.8–5.2)
RBC #/AREA URNS AUTO: <1 /HPF (ref 0–2)
SODIUM SERPL-SCNC: 138 MMOL/L (ref 133–144)
SOURCE: NORMAL
SP GR UR STRIP: 1.01 (ref 1–1.03)
TRIGL SERPL-MCNC: 64 MG/DL
UROBILINOGEN UR STRIP-MCNC: 0 MG/DL (ref 0–2)
WBC # BLD AUTO: 7.2 10E9/L (ref 4–11)
WBC #/AREA URNS AUTO: <1 /HPF (ref 0–5)

## 2019-11-21 RX ORDER — CEPHALEXIN 500 MG/1
CAPSULE ORAL
COMMUNITY
Start: 2019-11-14 | End: 2023-01-05

## 2019-11-21 ASSESSMENT — PAIN SCALES - GENERAL: PAINLEVEL: NO PAIN (0)

## 2019-11-21 NOTE — PROGRESS NOTES
"[facility] Dermatology Note      Dermatology Problem List:   1.  Alopecia areata, rapidly progressive, onset 10/2010 with scalp folliculitis. Currently on oral tofacitinib 5mg BID.  Repeat ILK 12/21/2017 and 3/22/2018.   2.  ROSARIO/JRA, improved with above. Follows with Dr. Black with Rheumatology; managed with tofacitinib monotherapy at this point.   3.  Mild dermatitis, ears  - TAC cream  4. Onychodystrophy, right great toenail  - PAS nail clipping sent 11/21/19  5. Acute paronychia, right great toe  - resolving s/p 7-day course of keflex     Encounter Date:  11/21/19     CC: Patient presents with:  Hair Loss: Amanda is here today for a hair loss follow up- Amanda states \"it's good\".       History of Present Illness:   Eliane Lockhart is a pleasant, 59-year-old woman who presents today in followup for alopecia areata and ROSARIO/JRA.  She was last seen 8/29/19 at which time she was continued on tofacitinib 5mg BID. Since the last visit she has done quite well.  Continues on this medication today without any issues or side effects. She has been using ketoconazole shampoo since it is gentle on her scalp. Notes that her hair is quite thick though she does note some thinning on the frontal scalp which she feels is more related to age. Notes she is shaving regularly on the legs. No new scalp, face or body hair loss. No shedding. Joint symptoms significantly improved on medication. Now following with Dr. Black in Rheum; initial consult 3/20/19. She denies significant infectious symptoms, including fevers, chills, sweats, unexplained weight loss, new lumps or bumps, respiratory symptoms, GI symptoms, among others.     Developed on acute paronychia of the right great toe about a week ago. She has been taking keflex for the last week which has helped significantly. The right toenail itself has become very crumbly and growing oddly. She reports that her left toenail cracked through the middle as well. She " had been walking quite a bit in [place] about a month ago and then noticed the changes in her toenails.        Past Medical History:    JRA/ROSARIO, treated with methotrexate in the distant past, now with NSAIDs and improved with tofacitinib as above, obesity, hyperlipidemia, asthma requiring multiple inhalers and intermittent prednisone.      Family History:   Daughter with atopic dermatitis; otherwise, no chronic skin or autoimmune conditions.        Current Outpatient Medications   Medication     albuterol (2.5 MG/3ML) 0.083% neb solution     albuterol (VENTOLIN HFA) 108 (90 BASE) MCG/ACT Inhaler     cephALEXin (KEFLEX) 500 MG capsule     fluticasone-salmeterol (ADVAIR DISKUS) 250-50 MCG/DOSE diskus inhaler     gentamicin (GARAMYCIN) 0.1 % cream     HYDROcodone-acetaminophen (NORCO) 5-325 MG per tablet     ketoconazole (NIZORAL) 2 % external shampoo     montelukast (SINGULAIR) 10 MG tablet     predniSONE (DELTASONE) 5 MG tablet     simvastatin (ZOCOR) 80 MG tablet     tofacitinib (XELJANZ) 5 MG tablet     triamcinolone (KENALOG) 0.1 % cream     triamterene (DYRENIUM) 50 MG capsule     No current facility-administered medications for this visit.         Allergies   Allergen Reactions     Seasonal Allergies Other (See Comments)     Runny nose, eyes, difficulty breathing         Review of Systems:   A 10 point review of systems was negative beyond that stated above in the HPI.       Physical exam:   /76 (BP Location: Right arm, Patient Position: Sitting, Cuff Size: Adult Regular)   Pulse 56    GENERAL:  A well-appearing  female appearing her stated age, affect normal, cooperative with the exam, alert and oriented x3.     SKIN:  A focused examination of the scalp, face, neck, hands, nails and feet is performed.     - No longer with alopecic patches  - Mild thinning over bitemporal scalp  - Negative hair pull test  - Eyelid/lash hair WNL  - right great toenail with yellowing, thickening, and subungual  debris; appears ingrown on medial aspect  - right great toe medial nailfold with mild erythema and edema, no tenderness or draiange  - left great toenail with horizontal breakage        Impression/Plan:   1.  Alopecia areata, rapidly progressive with secondary folliculitis, now much improved.  History of JRA/ROSARIO. Both conditions significantly improved on tofacitinib, which she has been on since approximately 05/2017. Patient stopped this medication in late spring of 2018 and did well for several weeks, but ultimately AA process restarted with significant loss of hair (joint sx worsening as well).  Restarted tofacitinib 5mg BID in August 2018, which she has tolerated this well without any significant side effects.  Again, we counseled her on the risks and benefits of the treatment approaches. No longer with patchy alopecic disease today; reassuring. No indication for additional ILK at this time. JRA is well controlled on tofacitinib monotherapy; follows with Dr. Black.  - Continue tofacitinib 5 mg BID pending safety labs today (CBC, CMP, lipid panel, UA today).  - Safety labs to be repeated at f/u visit in 3 months   - Continue with ketoconazole shampoo approximately 2-3 times weekly to the scalp.   - okay to restart minoxidil 5% foam, double dose once daily to the affected scalp if mild thinning becomes bothersome to her.   - Appreciate Rheumatology recommendations; follows with Dr. Black.     2. Onychodystrophy of great toenails, more concerning for onychomycosis of right great toenail with resolving acute paronychia  - complete 7-day course of keflex which has been improving symptoms  - nail clipping of right great toenail for PAS collected today 11/21/19 to rule out onychomycosis  - if positive, will need to check for possible interactions between oral terbinafine and xeljanz; otherwise, will opt for topical antifungal treatment    Dr. Dwyer staffed the patient.     Follow up in 3 months.    Staff  involved:  Resident(Marcia Navarrete)/Staff(as above)    Marcia Navarrete M.D.  PGY-4 Resident  Dermatology  [facility]    Staff Physician Comments:   I saw and evaluated the patient with the resident and I edited the assessment and plan as documented in the note. I was present for the examination.    Juan Dwyer MD   of Dermatology  Department of Dermatology  [facility]

## 2019-11-21 NOTE — NURSING NOTE
"Dermatology Rooming Note    Eliane Lockhart's goals for this visit include:   Chief Complaint   Patient presents with     Hair Loss     Amanda is here today for a hair loss follow up- Amanda states \"it's good\".      Sarai Barney, RMLIANNE     "

## 2019-11-21 NOTE — LETTER
"11/21/2019       RE: Eliane Lockhart  [address]  [facility] [address]     Dear Colleague,    Thank you for referring your patient, Eliane Lockhart, to the [facility] DERMATOLOGY at [facility]. Please see a copy of my visit note below.    [facility] Dermatology Note      Dermatology Problem List:   1.  Alopecia areata, rapidly progressive, onset 10/2010 with scalp folliculitis. Currently on oral tofacitinib 5mg BID.  Repeat ILK 12/21/2017 and 3/22/2018.   2.  ROSARIO/JRA, improved with above. Follows with Dr. Black with Rheumatology; managed with tofacitinib monotherapy at this point.   3.  Mild dermatitis, ears  - TAC cream  4. Onychodystrophy, right great toenail  - PAS nail clipping sent 11/21/19  5. Acute paronychia, right great toe  - resolving s/p 7-day course of keflex     Encounter Date:  11/21/19     CC: Patient presents with:  Hair Loss: Amanda is here today for a hair loss follow up- Amanda states \"it's good\".       History of Present Illness:   Eliane Lockhart is a pleasant, 59-year-old woman who presents today in followup for alopecia areata and ROSARIO/JRA.  She was last seen 8/29/19 at which time she was continued on tofacitinib 5mg BID. Since the last visit she has done quite well.  Continues on this medication today without any issues or side effects. She has been using ketoconazole shampoo since it is gentle on her scalp. Notes that her hair is quite thick though she does note some thinning on the frontal scalp which she feels is more related to age. Notes she is shaving regularly on the legs. No new scalp, face or body hair loss. No shedding. Joint symptoms significantly improved on medication. Now following with Dr. Black in Rheum; initial consult 3/20/19. She denies significant infectious symptoms, including fevers, chills, sweats, unexplained weight loss, new lumps or bumps, respiratory symptoms, GI symptoms, among others. "     Developed on acute paronychia of the right great toe about a week ago. She has been taking keflex for the last week which has helped significantly. The right toenail itself has become very crumbly and growing oddly. She reports that her left toenail cracked through the middle as well. She had been walking quite a bit in [place] about a month ago and then noticed the changes in her toenails.        Past Medical History:    JRA/ROSARIO, treated with methotrexate in the distant past, now with NSAIDs and improved with tofacitinib as above, obesity, hyperlipidemia, asthma requiring multiple inhalers and intermittent prednisone.      Family History:   Daughter with atopic dermatitis; otherwise, no chronic skin or autoimmune conditions.        Current Outpatient Medications   Medication     albuterol (2.5 MG/3ML) 0.083% neb solution     albuterol (VENTOLIN HFA) 108 (90 BASE) MCG/ACT Inhaler     cephALEXin (KEFLEX) 500 MG capsule     fluticasone-salmeterol (ADVAIR DISKUS) 250-50 MCG/DOSE diskus inhaler     gentamicin (GARAMYCIN) 0.1 % cream     HYDROcodone-acetaminophen (NORCO) 5-325 MG per tablet     ketoconazole (NIZORAL) 2 % external shampoo     montelukast (SINGULAIR) 10 MG tablet     predniSONE (DELTASONE) 5 MG tablet     simvastatin (ZOCOR) 80 MG tablet     tofacitinib (XELJANZ) 5 MG tablet     triamcinolone (KENALOG) 0.1 % cream     triamterene (DYRENIUM) 50 MG capsule     No current facility-administered medications for this visit.         Allergies   Allergen Reactions     Seasonal Allergies Other (See Comments)     Runny nose, eyes, difficulty breathing         Review of Systems:   A 10 point review of systems was negative beyond that stated above in the HPI.       Physical exam:   /76 (BP Location: Right arm, Patient Position: Sitting, Cuff Size: Adult Regular)   Pulse 56    GENERAL:  A well-appearing  female appearing her stated age, affect normal, cooperative with the exam, alert and oriented  x3.     SKIN:  A focused examination of the scalp, face, neck, hands, nails and feet is performed.     - No longer with alopecic patches  - Mild thinning over bitemporal scalp  - Negative hair pull test  - Eyelid/lash hair WNL  - right great toenail with yellowing, thickening, and subungual debris; appears ingrown on medial aspect  - right great toe medial nailfold with mild erythema and edema, no tenderness or draiange  - left great toenail with horizontal breakage        Impression/Plan:   1.  Alopecia areata, rapidly progressive with secondary folliculitis, now much improved.  History of JRA/ROSARIO. Both conditions significantly improved on tofacitinib, which she has been on since approximately 05/2017. Patient stopped this medication in late spring of 2018 and did well for several weeks, but ultimately AA process restarted with significant loss of hair (joint sx worsening as well).  Restarted tofacitinib 5mg BID in August 2018, which she has tolerated this well without any significant side effects.  Again, we counseled her on the risks and benefits of the treatment approaches. No longer with patchy alopecic disease today; reassuring. No indication for additional ILK at this time. JRA is well controlled on tofacitinib monotherapy; follows with Dr. Black.  - Continue tofacitinib 5 mg BID pending safety labs today (CBC, CMP, lipid panel, UA today).  - Safety labs to be repeated at f/u visit in 3 months   - Continue with ketoconazole shampoo approximately 2-3 times weekly to the scalp.   - okay to restart minoxidil 5% foam, double dose once daily to the affected scalp if mild thinning becomes bothersome to her.   - Appreciate Rheumatology recommendations; follows with Dr. Black.     2. Onychodystrophy of great toenails, more concerning for onychomycosis of right great toenail with resolving acute paronychia  - complete 7-day course of keflex which has been improving symptoms  - nail clipping of right great  toenail for PAS collected today 11/21/19 to rule out onychomycosis  - if positive, will need to check for possible interactions between oral terbinafine and xeljanz; otherwise, will opt for topical antifungal treatment    Dr. Dwyer staffed the patient.     Follow up in 3 months.    Staff involved:  Resident(Marcia Navarrete)/Staff(as above)    Marcia Navarrete M.D.  PGY-4 Resident  Dermatology  [facility]    Staff Physician Comments:   I saw and evaluated the patient with the resident and I edited the assessment and plan as documented in the note. I was present for the examination.    Juan Dwyer MD   of Dermatology  Department of Dermatology  [facility] [employer] [facility]                            Pictures were placed in Pt's chart today for future reference.

## 2019-11-22 LAB — COPATH REPORT: NORMAL

## 2019-11-27 NOTE — RESULT ENCOUNTER NOTE
Nail clipping for PAS was negative. Suspect likely trauma. If patient desires, can start urea 40% cream and vinegar soaks. Will re-evaluate in 3 months at which time may start oral terbinafine if no improvement. Discussed with Dr. Dwyer. Informed patient of results via [employer].

## 2019-12-02 ENCOUNTER — TELEPHONE (OUTPATIENT)
Dept: DERMATOLOGY | Facility: CLINIC | Age: 59
End: 2019-12-02

## 2019-12-02 DIAGNOSIS — L60.3 ONYCHODYSTROPHY: Primary | ICD-10-CM

## 2019-12-02 NOTE — TELEPHONE ENCOUNTER
Juan Dwyer MD  You; Nya Farley CMA 1 hour ago (1:45 PM)      Yes cream is fine.     Thanks!  Damian    Vanesa comment      I called the pharmacy back and I gave them the okay for the prescription change noted above.   Ling Gutierrez CMA

## 2019-12-02 NOTE — TELEPHONE ENCOUNTER
[facility] Call Center    Phone Message    May a detailed message be left on voicemail: yes    Reason for Call: Medication Question or concern regarding medication   Prescription Clarification  Name of Medication: Urea ointment  Prescribing Provider: Reymundo   Pharmacy: [facility] - [address]    What on the order needs clarification? Would like to change to cream as cannot obtain ointment. Please call to discuss.          Action Taken: Message routed to:  [facility] ([facility]): Derm

## 2020-02-20 ENCOUNTER — OFFICE VISIT (OUTPATIENT)
Dept: DERMATOLOGY | Facility: CLINIC | Age: 60
End: 2020-02-20
Payer: COMMERCIAL

## 2020-02-20 ENCOUNTER — APPOINTMENT (OUTPATIENT)
Dept: LAB | Facility: CLINIC | Age: 60
End: 2020-02-20
Payer: COMMERCIAL

## 2020-02-20 DIAGNOSIS — Z79.622 LONG-TERM CURRENT USE OF TOFACITINIB: Primary | ICD-10-CM

## 2020-02-20 DIAGNOSIS — L63.9 ALOPECIA AREATA: ICD-10-CM

## 2020-02-20 LAB
ALBUMIN SERPL-MCNC: 4 G/DL (ref 3.4–5)
ALBUMIN UR-MCNC: NEGATIVE MG/DL
ALP SERPL-CCNC: 97 U/L (ref 40–150)
ALT SERPL W P-5'-P-CCNC: 50 U/L (ref 0–50)
ANION GAP SERPL CALCULATED.3IONS-SCNC: 3 MMOL/L (ref 3–14)
APPEARANCE UR: CLEAR
AST SERPL W P-5'-P-CCNC: 28 U/L (ref 0–45)
BASOPHILS # BLD AUTO: 0.1 10E9/L (ref 0–0.2)
BASOPHILS NFR BLD AUTO: 1 %
BILIRUB SERPL-MCNC: 1.3 MG/DL (ref 0.2–1.3)
BILIRUB UR QL STRIP: NEGATIVE
BUN SERPL-MCNC: 16 MG/DL (ref 7–30)
CALCIUM SERPL-MCNC: 9.4 MG/DL (ref 8.5–10.1)
CHLORIDE SERPL-SCNC: 107 MMOL/L (ref 94–109)
CHOLEST SERPL-MCNC: 196 MG/DL
CO2 SERPL-SCNC: 29 MMOL/L (ref 20–32)
COLOR UR AUTO: YELLOW
CREAT SERPL-MCNC: 0.69 MG/DL (ref 0.52–1.04)
DIFFERENTIAL METHOD BLD: NORMAL
EOSINOPHIL # BLD AUTO: 0.2 10E9/L (ref 0–0.7)
EOSINOPHIL NFR BLD AUTO: 3.2 %
ERYTHROCYTE [DISTWIDTH] IN BLOOD BY AUTOMATED COUNT: 12.2 % (ref 10–15)
GFR SERPL CREATININE-BSD FRML MDRD: >90 ML/MIN/{1.73_M2}
GLUCOSE SERPL-MCNC: 104 MG/DL (ref 70–99)
GLUCOSE UR STRIP-MCNC: NEGATIVE MG/DL
HCT VFR BLD AUTO: 42.7 % (ref 35–47)
HDLC SERPL-MCNC: 93 MG/DL
HGB BLD-MCNC: 13.9 G/DL (ref 11.7–15.7)
HGB UR QL STRIP: ABNORMAL
IMM GRANULOCYTES # BLD: 0 10E9/L (ref 0–0.4)
IMM GRANULOCYTES NFR BLD: 0.3 %
KETONES UR STRIP-MCNC: NEGATIVE MG/DL
LDLC SERPL CALC-MCNC: 90 MG/DL
LEUKOCYTE ESTERASE UR QL STRIP: NEGATIVE
LYMPHOCYTES # BLD AUTO: 2.5 10E9/L (ref 0.8–5.3)
LYMPHOCYTES NFR BLD AUTO: 34.7 %
MCH RBC QN AUTO: 31.2 PG (ref 26.5–33)
MCHC RBC AUTO-ENTMCNC: 32.6 G/DL (ref 31.5–36.5)
MCV RBC AUTO: 96 FL (ref 78–100)
MONOCYTES # BLD AUTO: 0.5 10E9/L (ref 0–1.3)
MONOCYTES NFR BLD AUTO: 6.6 %
MUCOUS THREADS #/AREA URNS LPF: PRESENT /LPF
NEUTROPHILS # BLD AUTO: 3.9 10E9/L (ref 1.6–8.3)
NEUTROPHILS NFR BLD AUTO: 54.2 %
NITRATE UR QL: NEGATIVE
NONHDLC SERPL-MCNC: 102 MG/DL
NRBC # BLD AUTO: 0 10*3/UL
NRBC BLD AUTO-RTO: 0 /100
PH UR STRIP: 6 PH (ref 5–7)
PLATELET # BLD AUTO: 284 10E9/L (ref 150–450)
POTASSIUM SERPL-SCNC: 3.8 MMOL/L (ref 3.4–5.3)
PROT SERPL-MCNC: 7.5 G/DL (ref 6.8–8.8)
RBC # BLD AUTO: 4.45 10E12/L (ref 3.8–5.2)
RBC #/AREA URNS AUTO: 1 /HPF (ref 0–2)
SODIUM SERPL-SCNC: 139 MMOL/L (ref 133–144)
SOURCE: ABNORMAL
SP GR UR STRIP: 1.01 (ref 1–1.03)
SQUAMOUS #/AREA URNS AUTO: <1 /HPF (ref 0–1)
TRIGL SERPL-MCNC: 62 MG/DL
UROBILINOGEN UR STRIP-MCNC: 0 MG/DL (ref 0–2)
WBC # BLD AUTO: 7.3 10E9/L (ref 4–11)
WBC #/AREA URNS AUTO: <1 /HPF (ref 0–5)

## 2020-02-20 ASSESSMENT — PAIN SCALES - GENERAL
PAINLEVEL: MILD PAIN (2)
PAINLEVEL: NO PAIN (0)

## 2020-02-20 NOTE — PROGRESS NOTES
[facility] Dermatology Note     Dermatology Problem List:   1.  Alopecia areata, rapidly progressive, onset 10/2010 with scalp folliculitis. Currently on oral tofacitinib 5mg BID.  Repeat ILK 12/21/2017 and 3/22/2018.   2.  ROSARIO/JRA, improved with above. Follows with Dr. Black with Rheumatology; managed with tofacitinib monotherapy at this point.   3.  Mild dermatitis, ears  - TAC cream  4. Onychodystrophy, right great toenail, s/p partial avulsion Nov 2019  - PAS nail clipping 11/21/19 negative  5. Acute paronychia, right great toe  - s/p keflex      Encounter Date:  2/20/20     CC: Patient presents with:  Hair Loss: hair loss f/u - Amanda states that things have stayed the same      History of Present Illness:   Eliane Lockhart is a pleasant, 60-year-old woman who presents today in followup for alopecia areata and ROSARIO/JRA.  She was last seen 11/21/19 at which time she was continued on tofacitinib 5mg BID. Since the last visit she has done quite well.  Continues on this medication today without any issues or side effects. She has been using ketoconazole shampoo since it is gentle on her scalp. She has started using rogaine 5% foam all over the scalp but she has not been very consistent with this. Notes that her hair is quite thick though she does note some additional thinning on the right frontal scalp with mild increased shedding. Notes she is shaving regularly on the legs. No new scalp, face or body hair loss. Joint symptoms significantly improved on medication. Following with Dr. Black in Rheum for JRA/ROSARIO; initial consult 3/20/19. She had her right great toenail partially avulsed and it has been growing in normally since then. She has been having dry eyes and she would like to see an ophthalmologist at the . She denies significant infectious symptoms, including fevers, chills, sweats, unexplained weight loss, new lumps or bumps, respiratory symptoms, GI symptoms, among others.          Past Medical History:    JRA/ROSARIO, treated with methotrexate in the distant past, now with NSAIDs and improved with tofacitinib as above, obesity, hyperlipidemia, asthma requiring multiple inhalers and intermittent prednisone.      Family History:   Daughter with atopic dermatitis; otherwise, no chronic skin or autoimmune conditions.        Current Outpatient Medications   Medication     albuterol (VENTOLIN HFA) 108 (90 BASE) MCG/ACT Inhaler     cephALEXin (KEFLEX) 500 MG capsule     fluticasone-salmeterol (ADVAIR DISKUS) 250-50 MCG/DOSE diskus inhaler     gentamicin (GARAMYCIN) 0.1 % cream     HYDROcodone-acetaminophen (NORCO) 5-325 MG per tablet     ketoconazole (NIZORAL) 2 % external shampoo     montelukast (SINGULAIR) 10 MG tablet     predniSONE (DELTASONE) 5 MG tablet     simvastatin (ZOCOR) 80 MG tablet     tofacitinib (XELJANZ) 5 MG tablet     triamcinolone (KENALOG) 0.1 % cream     urea (GORDONS UREA) 40 % external ointment     No current facility-administered medications for this visit.         Allergies   Allergen Reactions     Seasonal Allergies Other (See Comments)     Runny nose, eyes, difficulty breathing         Review of Systems:   A 10 point review of systems was negative beyond that stated above in the HPI.       Physical exam:   There were no vitals taken for this visit.   GENERAL:  A well-appearing  female appearing her stated age, affect normal, cooperative with the exam, alert and oriented x3.     HEENT: Conjunctivae clear  NEURO: A/Ox3  MS: No joint deformity  PSYCH: Normal mood/affect  PULM: Breathing comfortably on RA  CV: Extrem warm and well perfused    SKIN:  A focused examination of the scalp, face, neck, hands, nails and feet is performed.     - Normal hair density throughout scalp without erythema or scaling  - Right temporal scalp with significantly decreased density but with many gray vellus hairs  - Mild thinning over left temporal scalp  - Negative hair pull test  -  Eyelid/lash hair WNL  - right great toenail with onycholysis of lateral aspect of nail   - right great toe medial nailfold without erythema or edema        Impression/Plan:   1.  Alopecia areata, rapidly progressive with secondary folliculitis, now much improved.  History of JRA/ROSARIO. Both conditions significantly improved on tofacitinib, which she has been on since approximately 05/2017. Patient stopped this medication in late spring of 2018 and did well for several weeks, but ultimately AA process restarted with significant loss of hair (joint sx worsening as well).  Restarted tofacitinib 5mg BID in August 2018, which she has tolerated this well without any significant side effects.  Again, we counseled her on the risks and benefits of the treatment approaches. JRA is well controlled on tofacitinib monotherapy; follows with Dr. Black. Decreased density of right temporal scalp suggests recurrence of alopecia areata vs female pattern hair loss. Performed ILK today to hasten improvement in addition to continuing rogaine.   - Kenalog intralesional injection procedure note: After verbal consent and discussion of risks including but not limited to atrophy, pain, and bruising, time out was performed, the patient underwent positioning and the area was prepped with isopropyl alcohol, 0.5 total cc of Kenalog 10 mg/cc was injected into 5 sites on the right temporal scalp.  The patient tolerated the procedure well and left the Dermatology clinic in good condition.    - Continue tofacitinib 5 mg BID pending safety labs today (CBC, CMP, lipid panel, UA today).  - Safety labs to be repeated at f/u visit in 3 months   - Continue with ketoconazole shampoo approximately 2-3 times weekly to the scalp.   - continue minoxidil 5% foam, double dose once daily to the affected scalp if mild thinning becomes bothersome to her.   - Appreciate Rheumatology recommendations; follows with Dr. Black.     2. Onychodystrophy of right great  toenail, much improved s/p partial avulsion Nov 2019  - okay to use urea 40% cream daily PRN      Dr. Olivo staffed the patient.     Follow up in 3 months.    Staff involved:  Resident(Marcia Navarrete)/Staff(as above)    Marcia Navarrete M.D.  PGY-4, Dermatology  [facility]    I have personally examined this patient and agree with Dr. Navarrete's documentation and plan of care. I have reviewed and amended the resident's note above. The documentation accurately reflects my clinical observations, diagnoses, treatment and follow-up plans. I was present for key portions of the procedure.       Coco Olivo MD  Dermatology Staff

## 2020-02-20 NOTE — PROGRESS NOTES
Drug Administration Record    Prior to injection, verified patient identity using patient's name and date of birth.  Due to injection administration, patient instructed to remain in clinic for 15 minutes  afterwards, and to report any adverse reaction to me immediately.    Drug Name: triamcinolone acetonide(kenalog)  Dose: 0.5mL of triamcinolone 10mg/mL, 5mg dose  Route administered: ID  NDC #: [id number]: Kenalog-10 ([id number])  Amount of waste(mL):4.5  Reason for waste: Multi dose vial    LOT #: [id number]  SITE: [facility]  : [employer]  EXPIRATION DATE: 06/2021

## 2020-02-25 NOTE — TELEPHONE ENCOUNTER
Per Dr. Navarrete, Pt was wondering status on the [employer] renewal. I called [employer] and they state they are still reviewing this. All

## 2020-03-10 ENCOUNTER — HEALTH MAINTENANCE LETTER (OUTPATIENT)
Age: 60
End: 2020-03-10

## 2020-05-05 ENCOUNTER — VIRTUAL VISIT (OUTPATIENT)
Dept: RHEUMATOLOGY | Facility: CLINIC | Age: 60
End: 2020-05-05
Attending: INTERNAL MEDICINE
Payer: COMMERCIAL

## 2020-05-05 DIAGNOSIS — M08.40 JIA (JUVENILE IDIOPATHIC ARTHRITIS), OLIGOARTHRITIS, PERSISTENT (H): Primary | ICD-10-CM

## 2020-05-05 ASSESSMENT — PAIN SCALES - GENERAL: PAINLEVEL: MILD PAIN (3)

## 2020-05-05 NOTE — PATIENT INSTRUCTIONS
Diagnosis:  1.  Inflammatory arthritis, markedly improved functional status and pain relief.  2.  Alopecia areata, markedly improved hair growth    Use of tofacitinib has been critical for the improvements observed in both musculoskeletal and cutaneous domains.  I recommend continuing use of the agent.    Plan:  1.  Continue tofacitinib 5 mg twice daily.  2.  While taking the drug, undergo testing for complete blood count, creatinine, liver function tests, every 4 months, and check fasting lipid panel every 6 months

## 2020-05-05 NOTE — PROGRESS NOTES
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 Sustained improvement in severe patchy hair loss due is also noted. Blood work completed 2020 revealed electrolytes, creatinine, liver function tests, CBC, and urinalysis all negative or normal.  Lipids were also normal.    I agree with Dr. Navarrete that both alopecia areata and inflammatory arthritis symptoms continue dramatically improved with use of tofacitinib. Functional status, including work capacity, is absolutely dependent upon the gains in inflammatory disease control that have been achieved with the drug. I recommend continuing tofacitinib 5 mg twice daily. While on the drug, she should undergo every 6 month lipid panel and every 3-4 months CBC, creatinine, and LFTs. We reviewed potential adverse effects, including increased frequency of infection. I recommend no change in medicaiton during the time of coronavirus, unless the patient should become infected with coronavirus.    Follow-up: 4 months    HPI:   Eliane Lockhart is a 60 year old female with a history of JRA who presents for follow up. She was last seen , at which time she was continued on tofacitinib monotherapy.     Interval history, 2020    She is doing well overall. 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 She has more swelling and pain in the hands and fingers, especially the knuckles. There is visible swelling and puffing up in the joints. Symptoms seem to correlate with with days that she works hard with her hands.    She is using a treadmill  and is walking daily. She is down another 9 pounds--40 lb overall.    There is still some pain in her L foot, but markedly reduced since she started tofacitinib. She is concerned about continued access to xeljanz due to insurance coverage changes.    Interval history, November 1, 2019: she was seen by Orthopedics in 08/2019 for evaluation of left hip pain. Assessment was of greater trochanteric pain syndrome. Left lateral thigh injection was given that day.     Patient was seen by Dr. Navarrete in Dermatology on 08/29/2019 for alopecia areata. Improvement in scalp hair thickness was reported and continued tofacitinib was recommended.     Today, she is doing well. She reports improved pain in her hands and left forefoot. She saw podiatry and discussed orthotics vs injection but she did not feel like she required either of these. She notes that pain has improved with weight loss and increased activity. She has about an hour of mild morning stiffness. She rarely using ibuprofen. She is tolerating xeljanz well. Prior hip pain improved with left lateral thigh injection. She has also worked with physical therapy on strengthening exercises.     Background history:   She was diagnosed with juvenile rheumatoid arthritis at age 10 when her left foot became so swollen that she could not get it in her shoes. She was on Tandaril briefly but this was subsequently taken off of the market, so she stopped the medication and also noted that aspirin was more effective. In her 20s, she developed hand pain, worse with grasping. She was followed by rheumatology for 5-6 years and was on methotrexate and plaquenil for an unknown period of time. She lost follow up due to problems with insurance. She also had some improvement in pain after some weight loss and managed pain with ibuprofen. She was then last seen in 2013, as noted above, but never followed up at the initial evaluation. Her hand pain progressed gradually since onset in her 20s.       She reports that after she was started on xeljanz by dermatology in 2017. her hand and foot pain improved substantially. Swelling over all MCPs and morning stiffness, which would previously last until about noon, also improved. She was also on prednisone at one point and has used ibuprofen, which also improved her foot and hand pain. Since starting xeljanz, she no longer requires ibuprofen. Her pain is overall about 80-90 percent improved, however she continues to have left forefoot pain, which makes it difficult to walk or stand for extended periods of time.     Review of Systems:   Pertinent items are noted in HPI or as below, remainder of complete ROS is negative.      No recent problems with hearing or vision. No swallowing problems.   No breathing difficulty, shortness of breath, coughing, or wheezing  No chest pain or palpitations  No heart burn, indigestion, abdominal pain, nausea, vomiting, diarrhea  No urination problems, no bloody, cloudy urine, no dysuria  No numbing, tingling, weakness  No headaches or confusion  No rashes. No easy bleeding or bruising.     Active Medications:     Current Outpatient Medications:      albuterol (VENTOLIN HFA) 108 (90 BASE) MCG/ACT Inhaler, , Disp: , Rfl:      cephALEXin (KEFLEX) 500 MG capsule, , Disp: , Rfl:      fluticasone-salmeterol (ADVAIR DISKUS) 250-50 MCG/DOSE diskus inhaler, , Disp: , Rfl:      gentamicin (GARAMYCIN) 0.1 % cream, Apply topically as needed , Disp: , Rfl:      HYDROcodone-acetaminophen (NORCO) 5-325 MG per tablet, , Disp: , Rfl:      ketoconazole (NIZORAL) 2 % external shampoo, Three times weekly: lather into scalp, leave in place for 3-5 minutes, then rinse. (Patient taking differently: daily as needed Three times weekly: lather into scalp, leave in place for 3-5 minutes, then rinse.), Disp: 120 mL, Rfl: 11     montelukast (SINGULAIR) 10 MG tablet, daily , Disp: , Rfl:      predniSONE (DELTASONE) 5 MG tablet, Take 8 tablets for 5 days, 7  tablets for 3 days, 6 tablets for 3 days, 5 tablets for 3 days, 4 tablets for 3 days, 3 tablets for 3 days, 2 tablets for 3 days, then 1 tablet for 3 days., Disp: 124 tablet, Rfl: 0     simvastatin (ZOCOR) 80 MG tablet, daily , Disp: , Rfl:      tofacitinib (XELJANZ) 5 MG tablet, Take 1 tablet (5 mg) by mouth 2 times daily, Disp: 60 tablet, Rfl: 6     triamcinolone (KENALOG) 0.1 % cream, Apply twice daily for ear rash as needed., Disp: 80 g, Rfl: 2     urea (GORDONS UREA) 40 % external ointment, Apply 1-2 times a day to left big toenail, Disp: 30 g, Rfl: 3      Allergies:   Seasonal allergies      Past Medical History:  Alopecia areata  Inflammatory arthritis   Dermatitis   Asthma  Glaucoma  Obesity   Hypercholesteremia       Past Surgical History:  Cataract bilateral 1996  Benign mass removed from rib cage 2000      Family History:   Father: diabetes mellitus, emphysema  Mother: diabetes mellitus   Maternal cousins x 2: breast cancer   Aunt: breast cancer   Daughter: atopic dermatitis       No family history of autoimmune disease   Multiple family members with spine surgery.      Social History:   No tobacco use.   Occasional alcohol use.      Physical Exam:   There were no vitals taken for this visit.   Wt Readings from Last 4 Encounters:   11/01/19 97.5 kg (215 lb)   06/14/19 95.3 kg (210 lb)   03/20/19 103.1 kg (227 lb 3.2 oz)     Psych: Normal judgement, orientation, memory, affect.     Laboratory:   RHEUM RESULTS Latest Ref Rng & Units 8/29/2019 11/21/2019 2/20/2020   CRP, INFLAMMATION 0.0 - 8.0 mg/L - - -   SHAHZAD SCREEN BY EIA <1.0 - - -   AST 0 - 45 U/L 31 27 28   ALT 0 - 50 U/L 51(H) 49 50   ALBUMIN 3.4 - 5.0 g/dL 3.9 3.8 4.0   WBC 4.0 - 11.0 10e9/L 10.3 7.2 7.3   RBC 3.8 - 5.2 10e12/L 4.26 4.34 4.45   HGB 11.7 - 15.7 g/dL 13.3 13.5 13.9   HCT 35.0 - 47.0 % 40.8 41.4 42.7   MCV 78 - 100 fl 96 95 96   MCHC 31.5 - 36.5 g/dL 32.6 32.6 32.6   RDW 10.0 - 15.0 % 12.4 12.1 12.2    - 450 10e9/L 281 295 284  "  CREATININE 0.52 - 1.04 mg/dL 0.80 0.76 0.69   GFR ESTIMATE, IF BLACK >60 mL/min/[1.73:m2] >90 >90 >90   GFR ESTIMATE >60 mL/min/[1.73:m2] 81 86 >90   HEPATITIS C ANTIBODY NR - - -     SHAHZAD Screen by EIA   Date Value Ref Range Status   01/26/2017 1.3 (H) <1.0 Final     Comment:     Interpretation:  Positive     Hep B Surface Agn   Date Value Ref Range Status   06/29/2017 Nonreactive NR Final     Quantiferon-TB Gold Plus Result   Date Value Ref Range Status   05/16/2019 Negative NEG^Negative Final     Comment:     No interferon gamma response to M.tuberculosis antigens was detected.   Infection with M.tuberculosis is unlikely, however a single negative result   does not exclude infection. In patients at high risk for infection, a second   test should be considered  in accordance with the 2017 ATS/IDSA/CDC Clinical Practice Guidelines for   Diagnosis of Tuberculosis in Adults and Children [Sade MARTINEZ et   al.Clin.Infect.Dis. 2017 64(2):111-115].       TB1 Ag minus Nil Value   Date Value Ref Range Status   05/16/2019 0.01 IU/mL Final     TB2 Ag minus Nil Value   Date Value Ref Range Status   05/16/2019 0.00 IU/mL Final     Mitogen minus Nil Result   Date Value Ref Range Status   05/16/2019 >10.00 IU/mL Final     Nil Result   Date Value Ref Range Status   05/16/2019 0.02 IU/mL Final     Eliane Lockhart is a 60 year old female who is being evaluated via a billable telephone visit.      The patient has been notified of following:     \"This telephone visit will be conducted via a call between you and your physician/provider. We have found that certain health care needs can be provided without the need for a physical exam.  This service lets us provide the care you need with a short phone conversation.  If a prescription is necessary we can send it directly to your pharmacy.  If lab work is needed we can place an order for that and you can then stop by our lab to have the test done at a later time.    Telephone visits " "are billed at different rates depending on your insurance coverage. During this emergency period, for some insurers they may be billed the same as an in-person visit.  Please reach out to your insurance provider with any questions.    If during the course of the call the physician/provider feels a telephone visit is not appropriate, you will not be charged for this service.\"    Patient has given verbal consent for Telephone visit?  Yes    What phone number would you like to be contacted at? [phone]    How would you like to obtain your AVS? MyChart        Phone call duration:  17 minutes    Edd Black MD        "

## 2020-05-05 NOTE — PROGRESS NOTES
Patient prefers to be called on the telephone instead of a video visit, I did switch it on the schedule.  Zayda Love CMA on 5/5/2020 at 11:55 AM

## 2020-05-27 ENCOUNTER — TELEPHONE (OUTPATIENT)
Dept: DERMATOLOGY | Facility: CLINIC | Age: 60
End: 2020-05-27

## 2020-06-04 ENCOUNTER — VIRTUAL VISIT (OUTPATIENT)
Dept: DERMATOLOGY | Facility: CLINIC | Age: 60
End: 2020-06-04
Payer: COMMERCIAL

## 2020-06-04 DIAGNOSIS — L63.9 AA (ALOPECIA AREATA): ICD-10-CM

## 2020-06-04 DIAGNOSIS — Z79.622 LONG-TERM CURRENT USE OF TOFACITINIB: Primary | ICD-10-CM

## 2020-06-04 ASSESSMENT — PAIN SCALES - GENERAL: PAINLEVEL: NO PAIN (0)

## 2020-06-04 NOTE — PROGRESS NOTES
[facility] Record:  Store and Forward and Telephone [phone]      Impression and Recommendations (Patient Counseled on the Following):  1.  Alopecia areata, rapidly progressive with secondary folliculitis, now much improved.  History of JRA/ROSARIO. Both conditions significantly improved on tofacitinib, which she has been on since approximately 05/2017. Patient stopped this medication in late spring of 2018 and did well for several weeks, but ultimately AA process restarted with significant loss of hair (joint sx worsening as well).  Restarted tofacitinib 5mg BID in August 2018, which she has tolerated this well without any significant side effects.  Again, we counseled her on the risks and benefits of the treatment approaches. JRA is well controlled on tofacitinib monotherapy; follows with Dr. Black. Decreased density of right temporal scalp suggests recurrence of alopecia areata vs female pattern hair loss.  - Continue tofacitinib 5 mg BID; messaged Halle Knowles to help sort out insurance coverage   - Safety labs in next 1-2 weeks (CBC, CMP, lipid panel, UA).  - Safety labs to be repeated at f/u visit in 3 months   - Continue with ketoconazole shampoo approximately 2-3 times weekly to the scalp.   - continue minoxidil 5% foam, double dose once daily to the affected scalp if mild thinning becomes bothersome to her.   - Appreciate Rheumatology recommendations; follows with Dr. Black.       Follow-up:   Follow-up with dermatology in approximately 3 months in person. Earlier for new or changing lesions or rash.      Staff and resident:    Resident(Marcia Navarrete)/Staff(Dr. Jayda Siddiqi)    Marcia Navarrete M.D.  PGY-4, Dermatology  [facility]    Staff Physician Comments:   I evaluated any available patient photographs with the resident and I edited the assessment and plan as documented in the note. I was present on the line and participated in the entire telephone call.    Juan Dwyer,  MD   of Dermatology  Department of Dermatology  [facility]    _____________________________________________________________________________    Dermatology Problem List:  1.  Alopecia areata, rapidly progressive, onset 10/2010 with scalp folliculitis. Currently on oral tofacitinib 5mg BID.  Repeat ILK 12/21/2017 and 3/22/2018.   2.  ROSARIO/JRA, improved with above. Follows with Dr. Black with Rheumatology; managed with tofacitinib monotherapy at this point.   3.  Mild dermatitis, ears  - TAC cream  4. Onychodystrophy, right great toenail, s/p partial avulsion Nov 2019  - PAS nail clipping 11/21/19 negative  5. Acute paronychia, right great toe  - s/p keflex     Encounter Date: Jun 4, 2020    CC:   Chief Complaint   Patient presents with     Hair Loss     Amanda is here for a hairloss follow up. She states that things are stable while on the medication        History of Present Illness:  I have reviewed the teledermatology information and the nursing intake corresponding to this issue. Eliane Lockhart is a 60 year old female who presents via teledermatology for hair loss f/u.  Last seen 2/20/2020 at which time she was continued on Xeljanz.  Today, she states that her hair has been doing well though she still has some thinning in the temporal areas.  She has been tolerating Xeljanz well without any recent infections.  She has developed some skin colored bumps around her left eye which have been irritating.  She has been inconsistent with using Rogaine.  She has had no itching, burning, or pain on the scalp.  She continues using ketoconazole shampoo with hair washing. She has been having issues getting Xeljanz covered by her insurance and has been speaking with Halle Knowles in the pharmacy department.  Her joint pain has been doing significantly better and she is now able to exercise.  No new scalp, face or body hair loss.  Has continued following with Dr. Black for  ROSARIO.  Otherwise, feels well on no other skin complaints.    Past Medical History:    JRA/ROSARIO, treated with methotrexate in the distant past, now with NSAIDs and improved with tofacitinib as above, obesity, hyperlipidemia, asthma requiring multiple inhalers and intermittent prednisone.     ROS: Patient is generally feeling well today    Physical Examination:  Skin: Focused examination within the teledermatology photograph(s) including face and scalp was performed.   -alopecia of the right frontotemporal scalp  -excellent regrowth elsewhere    Labs:  CBC, CMP, UA, and lipids in Feb 2020 WNL    Past Medical History:   Patient Active Problem List   Diagnosis     AA (alopecia areata)     Inflammatory arthritis     Dermatitis     No past medical history on file.  No past surgical history on file.    Social History:  Patient reports that she has never smoked. She has never used smokeless tobacco.    Family History:  Family History   Problem Relation Age of Onset     Melanoma No family hx of      Skin Cancer No family hx of        Medications:  Current Outpatient Medications   Medication     albuterol (VENTOLIN HFA) 108 (90 BASE) MCG/ACT Inhaler     cephALEXin (KEFLEX) 500 MG capsule     fluticasone-salmeterol (ADVAIR DISKUS) 250-50 MCG/DOSE diskus inhaler     gentamicin (GARAMYCIN) 0.1 % cream     HYDROcodone-acetaminophen (NORCO) 5-325 MG per tablet     ketoconazole (NIZORAL) 2 % external shampoo     montelukast (SINGULAIR) 10 MG tablet     predniSONE (DELTASONE) 5 MG tablet     simvastatin (ZOCOR) 80 MG tablet     tofacitinib (XELJANZ) 5 MG tablet     triamcinolone (KENALOG) 0.1 % cream     urea (GORDONS UREA) 40 % external ointment     No current facility-administered medications for this visit.           Allergies   Allergen Reactions     Seasonal Allergies Other (See Comments)     Runny nose, eyes, difficulty breathing          _____________________________________________________________________________    Teledermatology information:  - Location of patient in [place]: Home  - Patient presented as: return  - Location of teledermatologist:  [facility] DERMATOLOGY )  - Reason teledermatology is appropriate:  of National Emergency Regarding Coronavirus disease (COVID 19) Outbreak  - Image quality and interpretability: acceptable  - Physician has received verbal consent for a Video/Photos Visit from the patient? Yes  - In-person dermatology visit recommendation: yes - for physician visit  - Date of images: 6/3/20  - Service start time:1012  - Service end time:1025  - Date of report: 6/4/2020

## 2020-06-04 NOTE — PATIENT INSTRUCTIONS
[facility] Teledermatology Visit    Thank you for allowing us to participate in your care. Your findings, instructions and follow-up plan are as follows:      When should I call my doctor?    If you are worsening or not improving, please, contact us or seek urgent care as noted below.     Who should I call with questions (adults)?    [facility] (adult and pediatric): [phone]     [facility] (adult): [phone]    For urgent needs outside of business hours call the [facility] at [phone] and ask for the dermatology resident on call    If this is a medical emergency and you are unable to reach an ER, Call 911      Who should I call with questions (pediatric)?  [facility]- Pediatric Dermatology  Dr. Sandra Quezada, Dr. Donya Garcia, Dr. Coco Olivo, Riddhi Owen, PA  Dr. Carine Rodrigez, Dr. Jayda Siddiqi & Dr. Juan Benjamin  Non Urgent  Nurse Triage Line; [phone]- Mary and Qian RUSSELL Care Coordinators   Mary (/Complex ) [phone]    If you need a prescription refill, please contact your pharmacy. Refills are approved or denied by our Physicians during normal business hours, Monday through Fridays  Per office policy, refills will not be granted if you have not been seen within the past year (or sooner depending on your child's condition)    Scheduling Information:  Pediatric Appointment Scheduling and Call Center [phone]  Radiology Scheduling- [phone]  Sedation Unit Scheduling- [phone]  [place] Scheduling- General [phone]; Pediatric Dermatology [phone]  Main  Services: [phone]  Turkish: [phone]  Dutch: [phone]  Hmong/Tajik/Togolese: [phone]  Preadmission Nursing Department Fax Number: [phone] (Fax all pre-operative paperwork to this number)    For urgent matters arising during evenings,  weekends, or holidays that cannot wait for normal business hours please call [phone] and ask for the Dermatology Resident On-Call to be paged.

## 2020-06-04 NOTE — NURSING NOTE
Dermatology Rooming Note    Eliane Lockhart's goals for this visit include:   Chief Complaint   Patient presents with     Hair Loss     Amanda is here for a hairloss follow up. She states that things are stable while on the medication      Grace Marmolejo LPN

## 2020-06-04 NOTE — LETTER
6/4/2020       RE: Eliane Lockhart  [address]  [facility] [address]     Dear Colleague,    Thank you for referring your patient, Eliane Lockhart, to the [facility] DERMATOLOGY at [facility]. Please see a copy of my visit note below.    [facility]  Store and Forward and Telephone [phone]      Impression and Recommendations (Patient Counseled on the Following):  1.  Alopecia areata, rapidly progressive with secondary folliculitis, now much improved.  History of JRA/ROSARIO. Both conditions significantly improved on tofacitinib, which she has been on since approximately 05/2017. Patient stopped this medication in late spring of 2018 and did well for several weeks, but ultimately AA process restarted with significant loss of hair (joint sx worsening as well).  Restarted tofacitinib 5mg BID in August 2018, which she has tolerated this well without any significant side effects.  Again, we counseled her on the risks and benefits of the treatment approaches. JRA is well controlled on tofacitinib monotherapy; follows with Dr. Black. Decreased density of right temporal scalp suggests recurrence of alopecia areata vs female pattern hair loss.  - Continue tofacitinib 5 mg BID; messaged Myraarcelia Knowles to help sort out insurance coverage   - Safety labs in next 1-2 weeks (CBC, CMP, lipid panel, UA).  - Safety labs to be repeated at f/u visit in 3 months   - Continue with ketoconazole shampoo approximately 2-3 times weekly to the scalp.   - continue minoxidil 5% foam, double dose once daily to the affected scalp if mild thinning becomes bothersome to her.   - Appreciate Rheumatology recommendations; follows with Dr. Black.       Follow-up:   Follow-up with dermatology in approximately 3 months in person. Earlier for new or changing lesions or rash.      Staff and resident:    Resident(Marcia Navarrete)/Staff(Dr. Jayda Siddiqi)    Marcia Navarrete M.D.  PGY-4,  Dermatology  [facility]    Staff Physician Comments:   I evaluated any available patient photographs with the resident and I edited the assessment and plan as documented in the note. I was present on the line and participated in the entire telephone call.    Juan Dwyer MD   of Dermatology  Department of Dermatology  [facility]    _____________________________________________________________________________    Dermatology Problem List:  1.  Alopecia areata, rapidly progressive, onset 10/2010 with scalp folliculitis. Currently on oral tofacitinib 5mg BID.  Repeat ILK 12/21/2017 and 3/22/2018.   2.  ROSARIO/JRA, improved with above. Follows with Dr. Black with Rheumatology; managed with tofacitinib monotherapy at this point.   3.  Mild dermatitis, ears  - TAC cream  4. Onychodystrophy, right great toenail, s/p partial avulsion Nov 2019  - PAS nail clipping 11/21/19 negative  5. Acute paronychia, right great toe  - s/p keflex     Encounter Date: Jun 4, 2020    CC:   Chief Complaint   Patient presents with     Hair Loss     Amanda is here for a hairloss follow up. She states that things are stable while on the medication        History of Present Illness:  I have reviewed the teledermatology information and the nursing intake corresponding to this issue. Eliane Lockhart is a 60 year old female who presents via teledermatology for hair loss f/u.  Last seen 2/20/2020 at which time she was continued on Xeljanz.  Today, she states that her hair has been doing well though she still has some thinning in the temporal areas.  She has been tolerating Xeljanz well without any recent infections.  She has developed some skin colored bumps around her left eye which have been irritating.  She has been inconsistent with using Rogaine.  She has had no itching, burning, or pain on the scalp.  She continues using ketoconazole shampoo with hair washing. She has been  having issues getting Xeljanz covered by her insurance and has been speaking with Halle Knowles in the pharmacy department.  Her joint pain has been doing significantly better and she is now able to exercise.  No new scalp, face or body hair loss.  Has continued following with Dr. Black for ROSARIO.  Otherwise, feels well on no other skin complaints.    Past Medical History:    JRA/ROSARIO, treated with methotrexate in the distant past, now with NSAIDs and improved with tofacitinib as above, obesity, hyperlipidemia, asthma requiring multiple inhalers and intermittent prednisone.     ROS: Patient is generally feeling well today    Physical Examination:  Skin: Focused examination within the teledermatology photograph(s) including face and scalp was performed.   -alopecia of the right frontotemporal scalp  -excellent regrowth elsewhere    Labs:  CBC, CMP, UA, and lipids in Feb 2020 WNL    Past Medical History:   Patient Active Problem List   Diagnosis     AA (alopecia areata)     Inflammatory arthritis     Dermatitis     No past medical history on file.  No past surgical history on file.    Social History:  Patient reports that she has never smoked. She has never used smokeless tobacco.    Family History:  Family History   Problem Relation Age of Onset     Melanoma No family hx of      Skin Cancer No family hx of        Medications:  Current Outpatient Medications   Medication     albuterol (VENTOLIN HFA) 108 (90 BASE) MCG/ACT Inhaler     cephALEXin (KEFLEX) 500 MG capsule     fluticasone-salmeterol (ADVAIR DISKUS) 250-50 MCG/DOSE diskus inhaler     gentamicin (GARAMYCIN) 0.1 % cream     HYDROcodone-acetaminophen (NORCO) 5-325 MG per tablet     ketoconazole (NIZORAL) 2 % external shampoo     montelukast (SINGULAIR) 10 MG tablet     predniSONE (DELTASONE) 5 MG tablet     simvastatin (ZOCOR) 80 MG tablet     tofacitinib (XELJANZ) 5 MG tablet     triamcinolone (KENALOG) 0.1 % cream     urea (GORDONS UREA) 40 % external ointment      No current facility-administered medications for this visit.           Allergies   Allergen Reactions     Seasonal Allergies Other (See Comments)     Runny nose, eyes, difficulty breathing         _____________________________________________________________________________    Teledermatology information:  - Location of patient in [place]: Home  - Patient presented as: return  - Location of teledermatologist:  ([facility] DERMATOLOGY )  - Reason teledermatology is appropriate:  of National Emergency Regarding Coronavirus disease (COVID 19) Outbreak  - Image quality and interpretability: acceptable  - Physician has received verbal consent for a Video/Photos Visit from the patient? Yes  - In-person dermatology visit recommendation: yes - for physician visit  - Date of images: 6/3/20  - Service start time:1012  - Service end time:1025  - Date of report: 6/4/2020       Again, thank you for allowing me to participate in the care of your patient.      Sincerely,    Marcia Navarrete MD

## 2020-06-22 DIAGNOSIS — Z79.622 LONG-TERM CURRENT USE OF TOFACITINIB: ICD-10-CM

## 2020-06-22 LAB
ALBUMIN SERPL-MCNC: 3.7 G/DL (ref 3.4–5)
ALBUMIN UR-MCNC: NEGATIVE MG/DL
ALP SERPL-CCNC: 93 U/L (ref 40–150)
ALT SERPL W P-5'-P-CCNC: 41 U/L (ref 0–50)
ANION GAP SERPL CALCULATED.3IONS-SCNC: 6 MMOL/L (ref 3–14)
APPEARANCE UR: CLEAR
AST SERPL W P-5'-P-CCNC: 22 U/L (ref 0–45)
BASOPHILS # BLD AUTO: 0.1 10E9/L (ref 0–0.2)
BASOPHILS NFR BLD AUTO: 1 %
BILIRUB SERPL-MCNC: 0.9 MG/DL (ref 0.2–1.3)
BILIRUB UR QL STRIP: NEGATIVE
BUN SERPL-MCNC: 24 MG/DL (ref 7–30)
CALCIUM SERPL-MCNC: 8.7 MG/DL (ref 8.5–10.1)
CHLORIDE SERPL-SCNC: 107 MMOL/L (ref 94–109)
CHOLEST SERPL-MCNC: 160 MG/DL
CO2 SERPL-SCNC: 27 MMOL/L (ref 20–32)
COLOR UR AUTO: YELLOW
CREAT SERPL-MCNC: 0.8 MG/DL (ref 0.52–1.04)
DIFFERENTIAL METHOD BLD: NORMAL
EOSINOPHIL # BLD AUTO: 0.3 10E9/L (ref 0–0.7)
EOSINOPHIL NFR BLD AUTO: 3.3 %
ERYTHROCYTE [DISTWIDTH] IN BLOOD BY AUTOMATED COUNT: 12.3 % (ref 10–15)
GFR SERPL CREATININE-BSD FRML MDRD: 80 ML/MIN/{1.73_M2}
GLUCOSE SERPL-MCNC: 91 MG/DL (ref 70–99)
GLUCOSE UR STRIP-MCNC: NEGATIVE MG/DL
HCT VFR BLD AUTO: 40 % (ref 35–47)
HDLC SERPL-MCNC: 78 MG/DL
HGB BLD-MCNC: 12.9 G/DL (ref 11.7–15.7)
HGB UR QL STRIP: NEGATIVE
IMM GRANULOCYTES # BLD: 0 10E9/L (ref 0–0.4)
IMM GRANULOCYTES NFR BLD: 0.3 %
KETONES UR STRIP-MCNC: NEGATIVE MG/DL
LDLC SERPL CALC-MCNC: 68 MG/DL
LEUKOCYTE ESTERASE UR QL STRIP: ABNORMAL
LYMPHOCYTES # BLD AUTO: 2.5 10E9/L (ref 0.8–5.3)
LYMPHOCYTES NFR BLD AUTO: 31.6 %
MCH RBC QN AUTO: 31.4 PG (ref 26.5–33)
MCHC RBC AUTO-ENTMCNC: 32.3 G/DL (ref 31.5–36.5)
MCV RBC AUTO: 97 FL (ref 78–100)
MONOCYTES # BLD AUTO: 0.5 10E9/L (ref 0–1.3)
MONOCYTES NFR BLD AUTO: 6.1 %
NEUTROPHILS # BLD AUTO: 4.5 10E9/L (ref 1.6–8.3)
NEUTROPHILS NFR BLD AUTO: 57.7 %
NITRATE UR QL: NEGATIVE
NONHDLC SERPL-MCNC: 83 MG/DL
NRBC # BLD AUTO: 0 10*3/UL
NRBC BLD AUTO-RTO: 0 /100
PH UR STRIP: 6 PH (ref 5–7)
PLATELET # BLD AUTO: 246 10E9/L (ref 150–450)
POTASSIUM SERPL-SCNC: 3.8 MMOL/L (ref 3.4–5.3)
PROT SERPL-MCNC: 7.3 G/DL (ref 6.8–8.8)
RBC # BLD AUTO: 4.11 10E12/L (ref 3.8–5.2)
RBC #/AREA URNS AUTO: <1 /HPF (ref 0–2)
SODIUM SERPL-SCNC: 140 MMOL/L (ref 133–144)
SOURCE: ABNORMAL
SP GR UR STRIP: 1.01 (ref 1–1.03)
TRIGL SERPL-MCNC: 75 MG/DL
UROBILINOGEN UR STRIP-MCNC: 0 MG/DL (ref 0–2)
WBC # BLD AUTO: 7.8 10E9/L (ref 4–11)
WBC #/AREA URNS AUTO: 1 /HPF (ref 0–5)

## 2020-08-18 ENCOUNTER — TELEPHONE (OUTPATIENT)
Dept: RHEUMATOLOGY | Facility: CLINIC | Age: 60
End: 2020-08-18

## 2020-08-18 NOTE — TELEPHONE ENCOUNTER
PA Initiation    Medication: XELJANZ  Insurance Company: [employer] - Phone [phone] Fax [phone]  Pharmacy Filling the Rx: [facility] - [place], [place] - [facility] [address]  Filling Pharmacy Phone:    Filling Pharmacy Fax:    Start Date: 8/18/2020    FARHAN ROBLERO (Key: [id number])   Xeljanz 5MG OR TABS

## 2020-08-21 NOTE — TELEPHONE ENCOUNTER
Prior Authorization Approval    Authorization Effective Date: 7/20/2020  Authorization Expiration Date: 8/20/2021  Medication: XELJANZ - Approved   Approved Dose/Quantity:  60 FOR 30 DAYS   Reference #:     Insurance Company: [employer] - Phone [phone] Fax [phone]  Expected CoPay:       CoPay Card Available:      Foundation Assistance Needed:    Which Pharmacy is filling the prescription (Not needed for infusion/clinic administered): [facility] - [place] [address] (OFFERED)  Pharmacy Notified: No - awaiting preferred pharm prior to release)  Patient Notified: Yes - sent [facility]

## 2020-12-08 ENCOUNTER — TELEPHONE (OUTPATIENT)
Dept: DERMATOLOGY | Facility: CLINIC | Age: 60
End: 2020-12-08

## 2020-12-08 DIAGNOSIS — L63.9 ALOPECIA AREATA: ICD-10-CM

## 2020-12-08 RX ORDER — KETOCONAZOLE 20 MG/ML
SHAMPOO TOPICAL
Qty: 120 ML | Refills: 5 | Status: SHIPPED | OUTPATIENT
Start: 2020-12-08 | End: 2021-08-13

## 2020-12-08 NOTE — TELEPHONE ENCOUNTER
[facility] Call Center    Phone Message    May a detailed message be left on voicemail: yes     Reason for Call: Medication Refill Request    Has the patient contacted the pharmacy for the refill? Yes   Name of medication being requested: ketoconazole (NIZORAL) 2 % external shampoo  Provider who prescribed the medication: Reymundo  Pharmacy: [facility]  Date medication is needed: Now-pt is out - refill request was sent 11.27.20. Thanks          Action Taken: Message routed to:  [facility] erick dermat    Travel Screening: Not Applicable

## 2020-12-27 ENCOUNTER — HEALTH MAINTENANCE LETTER (OUTPATIENT)
Age: 60
End: 2020-12-27

## 2021-04-24 ENCOUNTER — HEALTH MAINTENANCE LETTER (OUTPATIENT)
Age: 61
End: 2021-04-24

## 2021-08-08 ENCOUNTER — HEALTH MAINTENANCE LETTER (OUTPATIENT)
Age: 61
End: 2021-08-08

## 2021-08-12 DIAGNOSIS — L63.9 ALOPECIA AREATA: ICD-10-CM

## 2021-08-13 RX ORDER — KETOCONAZOLE 20 MG/ML
SHAMPOO TOPICAL
Qty: 120 ML | Refills: 3 | Status: SHIPPED | OUTPATIENT
Start: 2021-08-13 | End: 2023-01-05

## 2021-08-13 NOTE — TELEPHONE ENCOUNTER
ketoconazole (NIZORAL) 2 % external shampoo  Last Written Prescription Date:  12/8/2020  Last Fill Quantity: 120,   # refills: 5  Last Office Visit : 6/4/2020  Future Office visit:  None    Routing refill request to provider for review/approval because:  Over 12 months,   Due for office visit  Refer to Coordinator and Provider On Call for review      Leticia Patrick RN  Central Triage Red Flags/Med Refills

## 2021-08-13 NOTE — TELEPHONE ENCOUNTER
I received a refill request for keto shampoo as the resident on call.  I reviewed the patient's chart and most recent labs.  The patient was last seen on 6/2020 by Dr. Dwyer for alopecia areata.  The plan at the time was to continue ketoconazole shampoo 3 times per week and follow up in 3 months.  This LIMITED refill request is approved as a courtesy to the patient to bridge to next appointment.  Patient will need to schedule follow up appointment to discuss the appropriateness of further refills of this medication with Dr. Dwyer.  CC'd Dr. Dwyer as FYI.  Route to scheduling for follow up.      Madison Britt MD

## 2021-09-30 ENCOUNTER — LAB (OUTPATIENT)
Dept: LAB | Facility: CLINIC | Age: 61
End: 2021-09-30
Payer: COMMERCIAL

## 2021-09-30 ENCOUNTER — OFFICE VISIT (OUTPATIENT)
Dept: DERMATOLOGY | Facility: CLINIC | Age: 61
End: 2021-09-30
Payer: COMMERCIAL

## 2021-09-30 DIAGNOSIS — L63.9 AA (ALOPECIA AREATA): ICD-10-CM

## 2021-09-30 DIAGNOSIS — L63.9 AA (ALOPECIA AREATA): Primary | ICD-10-CM

## 2021-09-30 LAB
ALBUMIN SERPL-MCNC: 4.1 G/DL (ref 3.4–5)
ALBUMIN UR-MCNC: NEGATIVE MG/DL
ALP SERPL-CCNC: 97 U/L (ref 40–150)
ALT SERPL W P-5'-P-CCNC: 42 U/L (ref 0–50)
ANION GAP SERPL CALCULATED.3IONS-SCNC: 8 MMOL/L (ref 3–14)
APPEARANCE UR: CLEAR
AST SERPL W P-5'-P-CCNC: 26 U/L (ref 0–45)
BASOPHILS # BLD AUTO: 0.1 10E3/UL (ref 0–0.2)
BASOPHILS NFR BLD AUTO: 1 %
BILIRUB SERPL-MCNC: 1.2 MG/DL (ref 0.2–1.3)
BILIRUB UR QL STRIP: NEGATIVE
BUN SERPL-MCNC: 23 MG/DL (ref 7–30)
CALCIUM SERPL-MCNC: 9.5 MG/DL (ref 8.5–10.1)
CHLORIDE BLD-SCNC: 105 MMOL/L (ref 94–109)
CHOLEST SERPL-MCNC: 190 MG/DL
CO2 SERPL-SCNC: 27 MMOL/L (ref 20–32)
COLOR UR AUTO: NORMAL
CREAT SERPL-MCNC: 0.8 MG/DL (ref 0.52–1.04)
EOSINOPHIL # BLD AUTO: 0.2 10E3/UL (ref 0–0.7)
EOSINOPHIL NFR BLD AUTO: 4 %
ERYTHROCYTE [DISTWIDTH] IN BLOOD BY AUTOMATED COUNT: 12.1 % (ref 10–15)
FASTING STATUS PATIENT QL REPORTED: YES
GFR SERPL CREATININE-BSD FRML MDRD: 80 ML/MIN/1.73M2
GLUCOSE BLD-MCNC: 99 MG/DL (ref 70–99)
GLUCOSE UR STRIP-MCNC: NEGATIVE MG/DL
HCT VFR BLD AUTO: 43.2 % (ref 35–47)
HDLC SERPL-MCNC: 81 MG/DL
HGB BLD-MCNC: 14.1 G/DL (ref 11.7–15.7)
HGB UR QL STRIP: NEGATIVE
IMM GRANULOCYTES # BLD: 0 10E3/UL
IMM GRANULOCYTES NFR BLD: 0 %
KETONES UR STRIP-MCNC: NEGATIVE MG/DL
LDLC SERPL CALC-MCNC: 97 MG/DL
LEUKOCYTE ESTERASE UR QL STRIP: NEGATIVE
LYMPHOCYTES # BLD AUTO: 2.8 10E3/UL (ref 0.8–5.3)
LYMPHOCYTES NFR BLD AUTO: 41 %
MCH RBC QN AUTO: 30.9 PG (ref 26.5–33)
MCHC RBC AUTO-ENTMCNC: 32.6 G/DL (ref 31.5–36.5)
MCV RBC AUTO: 95 FL (ref 78–100)
MONOCYTES # BLD AUTO: 0.4 10E3/UL (ref 0–1.3)
MONOCYTES NFR BLD AUTO: 6 %
NEUTROPHILS # BLD AUTO: 3.3 10E3/UL (ref 1.6–8.3)
NEUTROPHILS NFR BLD AUTO: 48 %
NITRATE UR QL: NEGATIVE
NONHDLC SERPL-MCNC: 109 MG/DL
NRBC # BLD AUTO: 0 10E3/UL
NRBC BLD AUTO-RTO: 0 /100
PH UR STRIP: 6 [PH] (ref 5–7)
PLATELET # BLD AUTO: 272 10E3/UL (ref 150–450)
POTASSIUM BLD-SCNC: 3.8 MMOL/L (ref 3.4–5.3)
PROT SERPL-MCNC: 7.6 G/DL (ref 6.8–8.8)
RBC # BLD AUTO: 4.56 10E6/UL (ref 3.8–5.2)
RBC URINE: 1 /HPF
SODIUM SERPL-SCNC: 140 MMOL/L (ref 133–144)
SP GR UR STRIP: 1.01 (ref 1–1.03)
TRIGL SERPL-MCNC: 60 MG/DL
UROBILINOGEN UR STRIP-MCNC: NORMAL MG/DL
WBC # BLD AUTO: 6.8 10E3/UL (ref 4–11)
WBC URINE: <1 /HPF

## 2021-09-30 PROCEDURE — 80061 LIPID PANEL: CPT | Performed by: PATHOLOGY

## 2021-09-30 PROCEDURE — 99213 OFFICE O/P EST LOW 20 MIN: CPT | Mod: 25

## 2021-09-30 PROCEDURE — 80053 COMPREHEN METABOLIC PANEL: CPT | Performed by: PATHOLOGY

## 2021-09-30 PROCEDURE — 85025 COMPLETE CBC W/AUTO DIFF WBC: CPT | Performed by: PATHOLOGY

## 2021-09-30 PROCEDURE — 11900 INJECT SKIN LESIONS </W 7: CPT | Mod: GC

## 2021-09-30 PROCEDURE — 81001 URINALYSIS AUTO W/SCOPE: CPT | Performed by: PATHOLOGY

## 2021-09-30 PROCEDURE — 36415 COLL VENOUS BLD VENIPUNCTURE: CPT | Performed by: PATHOLOGY

## 2021-09-30 ASSESSMENT — PAIN SCALES - GENERAL: PAINLEVEL: NO PAIN (0)

## 2021-09-30 NOTE — PROGRESS NOTES
[facility] Dermatology Note  Encounter Date: Sep 30, 2021  Office Visit     Dermatology Problem List:  1.  Alopecia areata, rapidly progressive, onset 10/2010 with scalp folliculitis. Prior: ILK injections.  Xeljanz since 5/2017.   - Current: oral tofacitinib 5mg BID (plan to resume today), topical minoxidil   2.  ROSARIO/JRA, improved with above. Follows with Dr. Black with Rheumatology; managed with tofacitinib monotherapy at this point.   3.  Mild dermatitis, ears. TAC cream  4. Onychodystrophy, right great toenail, s/p partial avulsion Nov 2019. PAS nail clipping 11/21/19 negative  5. Acute paronychia, right great toe, s/p keflex   ____________________________________________    Assessment & Plan:     1.  Alopecia areata. Previously rapidly progressive with secondary folliculitis with subsequent improvement on xeljanz 5mg BID (since 5/2017), but is now currently flaring due to inconsistent use and running out of medication. History of JRA/ROSARIO; both conditions significantly improved on tofacitinib. 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   - IL-K injections today (see procedure note below)   - Safety labs ordered: CBC, CMP, lipid panel, UA (to be drawn today)  - Resume oral tofacitinib 5mg BID (order placed; initiation pending reassuring labs); sent message to Brionna Johnson for assistance with co-pay card   - Will hold off on starting topical steroids as patient was under better control previously while not using any thus there is a risk of atrophy without much benefit   - Continue OTC topical minoxidil      Procedures Performed:   - Intra-lesional triamcinolone procedure note. After positioning and cleansing with isopropyl alcohol, 1.5 total mL of triamcinolone 10 mg/mL was injected into 3 lesion(s) on the scalp. The patient tolerated the procedure well and left the dermatology clinic in good condition.    Follow-up: 3 month(s)  in-person, or earlier for new or changing lesions    Staff and Resident:     Elis Torres MD PGY3     Patient was staffed with Dr. Gisel LAUREN, Stella Batres MD, saw this patient with the resident and agree with the resident s findings and plan of care as documented in the resident s note.    ____________________________________________    CC: Derm Problem (pt states she is here for a follow up on her hair loss )    HPI:  Ms. Eliane Lockhart is a(n) 61 year old female who presents today as a return patient for alopecia areata, which first surfaced about 5 years ago. She was under good control with xeljanz 5mg BID and felt that her disease process had likely run its course; this compounded by the chaos of the pandemic brought her to change her regimen of xeljanz from routine to more sporadic. Unfortunately, with less routine use of xeljanz, her disease activity has spiked and she currently has 3 active patches on the scalp. She notes that she has an important family wedding coming up in 2 weeks and has essentially run out of all of her medication. She has started minoxidil 5% foam 1 week ago. Of note, she follows with Dr. Black for rheumatoid arthritis and while she was taking xeljanz, her arthritis also improved. Patient is otherwise feeling well, without additional skin concerns.    Labs Reviewed:  CBC, CMP, UA, and lipids in Feb 2020 wnl.     Physical Exam:  Vitals: There were no vitals taken for this visit.  SKIN: Focused examination of hair and scalp was performed.  - there are three large round patches of alopecia noted on the scalp (frontal, parietal, occipital) without notable erythema or scale; hair pull test is negative   - No other lesions of concern on areas examined.     Medications:  Current Outpatient Medications   Medication     albuterol (VENTOLIN HFA) 108 (90 BASE) MCG/ACT Inhaler     fluticasone-salmeterol (ADVAIR DISKUS) 250-50 MCG/DOSE diskus inhaler     HYDROcodone-acetaminophen  (NORCO) 5-325 MG per tablet     ketoconazole (NIZORAL) 2 % external shampoo     montelukast (SINGULAIR) 10 MG tablet     simvastatin (ZOCOR) 80 MG tablet     tofacitinib (XELJANZ) 5 MG tablet     tofacitinib (XELJANZ) 5 MG tablet     triamcinolone (KENALOG) 0.1 % cream     cephALEXin (KEFLEX) 500 MG capsule     gentamicin (GARAMYCIN) 0.1 % cream     predniSONE (DELTASONE) 5 MG tablet     urea (GORDONS UREA) 40 % external ointment     No current facility-administered medications for this visit.      Past Medical History:   Patient Active Problem List   Diagnosis     AA (alopecia areata)     Inflammatory arthritis     Dermatitis     No past medical history on file.    CC Madai Elizondo  [address] [place] [address] on close of this encounter.

## 2021-09-30 NOTE — LETTER
9/30/2021       RE: Eliane Lockhart  [address]  [place] [address]     Dear Colleague,    Thank you for referring your patient, Eliane Lockhart, to the [facility] DERMATOLOGY CLINIC [place] at [facility]. Please see a copy of my visit note below.    [facility] Dermatology Note  Encounter Date: Sep 30, 2021  Office Visit     Dermatology Problem List:  1.  Alopecia areata, rapidly progressive, onset 10/2010 with scalp folliculitis. Prior: ILK injections.  Xeljanz since 5/2017.   - Current: oral tofacitinib 5mg BID (plan to resume today), topical minoxidil   2.  ROSARIO/JRA, improved with above. Follows with Dr. Black with Rheumatology; managed with tofacitinib monotherapy at this point.   3.  Mild dermatitis, ears. TAC cream  4. Onychodystrophy, right great toenail, s/p partial avulsion Nov 2019. PAS nail clipping 11/21/19 negative  5. Acute paronychia, right great toe, s/p keflex   ____________________________________________    Assessment & Plan:     1.  Alopecia areata. Previously rapidly progressive with secondary folliculitis with subsequent improvement on xeljanz 5mg BID (since 5/2017), but is now currently flaring due to inconsistent use and running out of medication. History of JRA/ROSARIO; both conditions significantly improved on tofacitinib. 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   - IL-K injections today (see procedure note below)   - Safety labs ordered: CBC, CMP, lipid panel, UA (to be drawn today)  - Resume oral tofacitinib 5mg BID (order placed; initiation pending reassuring labs); sent message to Brionna Johnson for assistance with co-pay card   - Will hold off on starting topical steroids as patient was under better control previously while not using any thus there is a risk of atrophy without much benefit   - Continue OTC topical minoxidil       Procedures Performed:   - Intra-lesional triamcinolone procedure note. After positioning and cleansing with isopropyl alcohol, 1.5 total mL of triamcinolone 10 mg/mL was injected into 3 lesion(s) on the scalp. The patient tolerated the procedure well and left the dermatology clinic in good condition.    Follow-up: 3 month(s) in-person, or earlier for new or changing lesions    Staff and Resident:     Elis Torres MD PGY3     Patient was staffed with Dr. Gisel LAUREN, Stella Batres MD, saw this patient with the resident and agree with the resident s findings and plan of care as documented in the resident s note.    ____________________________________________    CC: Derm Problem (pt states she is here for a follow up on her hair loss )    HPI:  Ms. Eliane Lockhart is a(n) 61 year old female who presents today as a return patient for alopecia areata, which first surfaced about 5 years ago. She was under good control with xeljanz 5mg BID and felt that her disease process had likely run its course; this compounded by the chaos of the pandemic brought her to change her regimen of xeljanz from routine to more sporadic. Unfortunately, with less routine use of xeljanz, her disease activity has spiked and she currently has 3 active patches on the scalp. She notes that she has an important family wedding coming up in 2 weeks and has essentially run out of all of her medication. She has started minoxidil 5% foam 1 week ago. Of note, she follows with Dr. Black for rheumatoid arthritis and while she was taking xeljanz, her arthritis also improved. Patient is otherwise feeling well, without additional skin concerns.    Labs Reviewed:  CBC, CMP, UA, and lipids in Feb 2020 wnl.     Physical Exam:  Vitals: There were no vitals taken for this visit.  SKIN: Focused examination of hair and scalp was performed.  - there are three large round patches of alopecia noted on the scalp (frontal, parietal, occipital) without  notable erythema or scale; hair pull test is negative   - No other lesions of concern on areas examined.     Medications:  Current Outpatient Medications   Medication     albuterol (VENTOLIN HFA) 108 (90 BASE) MCG/ACT Inhaler     fluticasone-salmeterol (ADVAIR DISKUS) 250-50 MCG/DOSE diskus inhaler     HYDROcodone-acetaminophen (NORCO) 5-325 MG per tablet     ketoconazole (NIZORAL) 2 % external shampoo     montelukast (SINGULAIR) 10 MG tablet     simvastatin (ZOCOR) 80 MG tablet     tofacitinib (XELJANZ) 5 MG tablet     tofacitinib (XELJANZ) 5 MG tablet     triamcinolone (KENALOG) 0.1 % cream     cephALEXin (KEFLEX) 500 MG capsule     gentamicin (GARAMYCIN) 0.1 % cream     predniSONE (DELTASONE) 5 MG tablet     urea (GORDONS UREA) 40 % external ointment     No current facility-administered medications for this visit.      Past Medical History:   Patient Active Problem List   Diagnosis     AA (alopecia areata)     Inflammatory arthritis     Dermatitis     No past medical history on file.    CC Madai Mikhail  [address] [place] [address] on close of this encounter.        Drug Administration Record    Prior to injection, verified patient identity using patient's name and date of birth.  Due to injection administration, patient instructed to remain in clinic for 15 minutes  afterwards, and to report any adverse reaction to me immediately.    Drug Name: triamcinolone acetonide(kenalog)  Dose: 1.5 mL of triamcinolone 10mg/mL, 15 mg dose  Route administered: ID  NDC #: Kenalog-10 (0621-6850-89)  Amount of waste(mL): 3.5  Reason for waste: Multi dose vial    LOT #: [id number]  SITE: See Note  : [employer]  EXPIRATION DATE: FEB 2023

## 2021-09-30 NOTE — PROGRESS NOTES
Drug Administration Record    Prior to injection, verified patient identity using patient's name and date of birth.  Due to injection administration, patient instructed to remain in clinic for 15 minutes  afterwards, and to report any adverse reaction to me immediately.    Drug Name: triamcinolone acetonide(kenalog)  Dose: 1.5 mL of triamcinolone 10mg/mL, 15 mg dose  Route administered: ID  NDC #: Kenalog-10 (1033-4569-89)  Amount of waste(mL): 3.5  Reason for waste: Multi dose vial    LOT #: [id number]  SITE: See Note  : [employer]  EXPIRATION DATE: FEB 2023

## 2021-10-01 ENCOUNTER — TELEPHONE (OUTPATIENT)
Dept: DERMATOLOGY | Facility: CLINIC | Age: 61
End: 2021-10-01

## 2021-10-01 NOTE — TELEPHONE ENCOUNTER
PA Initiation    Medication: Xeljanz  Insurance Company: [employer] - Phone [phone] Fax [phone]  ID#: [id number]  Pharmacy Filling the Rx: [facility] - [place], [place] - [facility] [address]  Filling Pharmacy Phone:    Filling Pharmacy Fax:    Start Date: 10/1/2021    [facility] Key [id number]

## 2021-10-03 ENCOUNTER — HEALTH MAINTENANCE LETTER (OUTPATIENT)
Age: 61
End: 2021-10-03

## 2021-10-11 NOTE — TELEPHONE ENCOUNTER
Prior Authorization Approval    Authorization Effective Date: 9/8/2021  Authorization Expiration Date: 10/8/2022  Medication: Xeljanz  Approved Dose/Quantity:   Reference #: [id number] [id number]   Insurance Company: [employer] - Phone [phone] Fax [phone]  Expected CoPay:       CoPay Card Available: Yes    Foundation Assistance Needed:    Which Pharmacy is filling the prescription (Not needed for infusion/clinic administered): [facility] - [place] [address] [address]   Pharmacy Notified: Yes  Patient Notified: Yes  I signed the patient up for a copay card.

## 2022-03-11 ENCOUNTER — TELEPHONE (OUTPATIENT)
Dept: DERMATOLOGY | Facility: CLINIC | Age: 62
End: 2022-03-11
Payer: COMMERCIAL

## 2022-03-11 NOTE — TELEPHONE ENCOUNTER
Called patient and reached voicemail. Patient was due for follow-up 1/6/22 however appointment was cancelled without reschedule. She is on xeljanz for alopecia areatam, last safety labs were drawn 9/30/21, highlighted on voicemail that her last labs were all within normal limits, and that she is due for follow-up, which she may call the clinic to schedule with me, either in-person or virtual.

## 2022-03-18 DIAGNOSIS — L63.9 AA (ALOPECIA AREATA): ICD-10-CM

## 2022-03-22 NOTE — TELEPHONE ENCOUNTER
tofacitinib (XELJANZ) 5 MG tablet      Last Written Prescription Date:  9/30/21  Last Fill Quantity: 60,   # refills: 6  Last Office Visit : 9/30/21 recommended 6 month follow up  Future Office visit:  None scheduled    Routing refill request to provider for review/approval because:  Drug not on derm refill protocol

## 2022-03-23 NOTE — TELEPHONE ENCOUNTER
Received refill request for PO Xeljanz as the resident on call. Reviewed patient's chart and attached communication. Patient last seen 9/30/21 by Lizz Torres/Gisel for alopecia areata with plan for RTC in 3 months. She was notified by Dr. Torres on 3/11 that she needs to schedule follow up. She is overdue for safety labs and follow up.    After reviewing the medication list and assessment and plan from last visit, the refill request was declined. I will send a message to the scheduling pool to get her scheduled.    CC'ing Dr. Torres and Dr. Batres as FYI.    Marianne Bethea MD

## 2022-03-23 NOTE — TELEPHONE ENCOUNTER
Thanks for notifying! Patient has been stable on xeljanz since 2017 with brief flare when she discontinued in 2018, therefore, sent Rx for xeljanz today with refill quant 1 for total of 2 months to serve as bridge until patient is able to schedule and come in for clinic visit for further refills.     CC Dr Batres as BENIGNOI   CC schedulers to call patient for clinic visit preferably within 2 months (before refill runs out)

## 2022-05-15 ENCOUNTER — HEALTH MAINTENANCE LETTER (OUTPATIENT)
Age: 62
End: 2022-05-15

## 2022-08-08 ENCOUNTER — HOSPITAL ENCOUNTER (EMERGENCY)
Dept: HOSPITAL 11 - JP.ED | Age: 62
Discharge: HOME | End: 2022-08-08
Payer: COMMERCIAL
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
## 2022-08-08 DIAGNOSIS — S76.302A: Primary | ICD-10-CM
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
## 2022-08-08 DIAGNOSIS — Z79.899: ICD-10-CM

## 2022-08-08 PROCEDURE — 96372 THER/PROPH/DIAG INJ SC/IM: CPT

## 2022-08-08 PROCEDURE — 99283 EMERGENCY DEPT VISIT LOW MDM: CPT

## 2022-08-15 DIAGNOSIS — L63.9 ALOPECIA AREATA: ICD-10-CM

## 2022-08-18 RX ORDER — KETOCONAZOLE 20 MG/ML
SHAMPOO TOPICAL
Qty: 120 ML | Refills: 3 | OUTPATIENT
Start: 2022-08-18

## 2022-09-11 ENCOUNTER — HEALTH MAINTENANCE LETTER (OUTPATIENT)
Age: 62
End: 2022-09-11

## 2022-11-07 ENCOUNTER — TELEPHONE (OUTPATIENT)
Dept: DERMATOLOGY | Facility: CLINIC | Age: 62
End: 2022-11-07

## 2022-11-07 NOTE — TELEPHONE ENCOUNTER
[facility] Call Center    Phone Message    May a detailed message be left on voicemail: yes     Reason for Call: Medication Refill Request    Has the patient contacted the pharmacy for the refill? Yes   Name of medication being requested: ketoconazole (NIZORAL) 2 % external shampoo  Provider who prescribed the medication: Dr. Dwyer  Pharmacy: [address] fax: [phone]  Date medication is needed: ASAP       Action Taken: Message routed to:  [facility] DERM    Travel Screening: Not Applicable

## 2022-11-07 NOTE — TELEPHONE ENCOUNTER
Pt has not been seen for over 1 year. No refill given at this time. Refills were already given to bridge to appt

## 2023-01-05 ENCOUNTER — OFFICE VISIT (OUTPATIENT)
Dept: DERMATOLOGY | Facility: CLINIC | Age: 63
End: 2023-01-05
Payer: COMMERCIAL

## 2023-01-05 ENCOUNTER — LAB (OUTPATIENT)
Dept: LAB | Facility: CLINIC | Age: 63
End: 2023-01-05
Payer: COMMERCIAL

## 2023-01-05 DIAGNOSIS — L63.9 ALOPECIA AREATA: ICD-10-CM

## 2023-01-05 DIAGNOSIS — Z51.81 MEDICATION MONITORING ENCOUNTER: Primary | ICD-10-CM

## 2023-01-05 DIAGNOSIS — L63.9 AA (ALOPECIA AREATA): ICD-10-CM

## 2023-01-05 LAB
ALBUMIN SERPL BCG-MCNC: 4.2 G/DL (ref 3.5–5.2)
ALP SERPL-CCNC: 102 U/L (ref 35–104)
ALT SERPL W P-5'-P-CCNC: 24 U/L (ref 10–35)
ANION GAP SERPL CALCULATED.3IONS-SCNC: 9 MMOL/L (ref 7–15)
AST SERPL W P-5'-P-CCNC: 23 U/L (ref 10–35)
BASOPHILS # BLD AUTO: 0.1 10E3/UL (ref 0–0.2)
BASOPHILS NFR BLD AUTO: 1 %
BILIRUB SERPL-MCNC: 0.8 MG/DL
BUN SERPL-MCNC: 15.6 MG/DL (ref 8–23)
CALCIUM SERPL-MCNC: 9.5 MG/DL (ref 8.8–10.2)
CHLORIDE SERPL-SCNC: 107 MMOL/L (ref 98–107)
CHOLEST SERPL-MCNC: 275 MG/DL
CREAT SERPL-MCNC: 0.73 MG/DL (ref 0.51–0.95)
DEPRECATED HCO3 PLAS-SCNC: 24 MMOL/L (ref 22–29)
EOSINOPHIL # BLD AUTO: 0.3 10E3/UL (ref 0–0.7)
EOSINOPHIL NFR BLD AUTO: 4 %
ERYTHROCYTE [DISTWIDTH] IN BLOOD BY AUTOMATED COUNT: 13.1 % (ref 10–15)
GFR SERPL CREATININE-BSD FRML MDRD: >90 ML/MIN/1.73M2
GLUCOSE SERPL-MCNC: 101 MG/DL (ref 70–99)
HCT VFR BLD AUTO: 41.1 % (ref 35–47)
HDLC SERPL-MCNC: 75 MG/DL
HGB BLD-MCNC: 13.5 G/DL (ref 11.7–15.7)
IMM GRANULOCYTES # BLD: 0 10E3/UL
IMM GRANULOCYTES NFR BLD: 0 %
LDLC SERPL CALC-MCNC: 170 MG/DL
LYMPHOCYTES # BLD AUTO: 2.3 10E3/UL (ref 0.8–5.3)
LYMPHOCYTES NFR BLD AUTO: 28 %
MCH RBC QN AUTO: 30.1 PG (ref 26.5–33)
MCHC RBC AUTO-ENTMCNC: 32.8 G/DL (ref 31.5–36.5)
MCV RBC AUTO: 92 FL (ref 78–100)
MONOCYTES # BLD AUTO: 0.5 10E3/UL (ref 0–1.3)
MONOCYTES NFR BLD AUTO: 7 %
NEUTROPHILS # BLD AUTO: 4.8 10E3/UL (ref 1.6–8.3)
NEUTROPHILS NFR BLD AUTO: 60 %
NONHDLC SERPL-MCNC: 200 MG/DL
NRBC # BLD AUTO: 0 10E3/UL
NRBC BLD AUTO-RTO: 0 /100
PLATELET # BLD AUTO: 304 10E3/UL (ref 150–450)
POTASSIUM SERPL-SCNC: 4.2 MMOL/L (ref 3.4–5.3)
PROT SERPL-MCNC: 7.1 G/DL (ref 6.4–8.3)
RBC # BLD AUTO: 4.48 10E6/UL (ref 3.8–5.2)
SODIUM SERPL-SCNC: 140 MMOL/L (ref 136–145)
TRIGL SERPL-MCNC: 149 MG/DL
WBC # BLD AUTO: 8 10E3/UL (ref 4–11)

## 2023-01-05 PROCEDURE — 80061 LIPID PANEL: CPT | Performed by: PATHOLOGY

## 2023-01-05 PROCEDURE — 85025 COMPLETE CBC W/AUTO DIFF WBC: CPT | Performed by: PATHOLOGY

## 2023-01-05 PROCEDURE — 80053 COMPREHEN METABOLIC PANEL: CPT | Performed by: PATHOLOGY

## 2023-01-05 PROCEDURE — 11901 INJECT SKIN LESIONS >7: CPT | Mod: GC | Performed by: STUDENT IN AN ORGANIZED HEALTH CARE EDUCATION/TRAINING PROGRAM

## 2023-01-05 PROCEDURE — 36415 COLL VENOUS BLD VENIPUNCTURE: CPT | Performed by: PATHOLOGY

## 2023-01-05 PROCEDURE — 99214 OFFICE O/P EST MOD 30 MIN: CPT | Mod: 25 | Performed by: STUDENT IN AN ORGANIZED HEALTH CARE EDUCATION/TRAINING PROGRAM

## 2023-01-05 RX ORDER — KETOCONAZOLE 20 MG/ML
SHAMPOO TOPICAL
Qty: 120 ML | Refills: 3 | Status: CANCELLED | OUTPATIENT
Start: 2023-01-05

## 2023-01-05 RX ORDER — KETOCONAZOLE 20 MG/ML
SHAMPOO TOPICAL
Qty: 120 ML | Refills: 11 | Status: SHIPPED | OUTPATIENT
Start: 2023-01-05 | End: 2024-06-05

## 2023-01-05 ASSESSMENT — PAIN SCALES - GENERAL: PAINLEVEL: NO PAIN (0)

## 2023-01-05 NOTE — NURSING NOTE
Dermatology Rooming Note    Eliane Lockhart's goals for this visit include:   Chief Complaint   Patient presents with     Hair Loss     Xeljanx follow up.  Starting to get some patches in the back that are falling out since she's been out of medication.       Sherley Johnson, CMA

## 2023-01-05 NOTE — NURSING NOTE
Drug Administration Record    Prior to injection, verified patient identity using patient's name and date of birth.  Due to injection administration, patient instructed to remain in clinic for 15 minutes  afterwards, and to report any adverse reaction to me immediately.    Drug Name: triamcinolone acetonide(kenalog)  Dose: 2mL of triamcinolone 10mg/mL, 20mg dose  Route administered: ID  NDC #: Kenalog-10 (2986-0040-86)  Amount of waste(mL):3mL  Reason for waste: Multi dose vial    LOT #: [id number]  SITE: see provider's note  : [employer]  EXPIRATION DATE: 08/2024

## 2023-01-05 NOTE — PATIENT INSTRUCTIONS
ILK injections today. Labs today, refilled Xeljanz 5 mg twice daily and ketoconazole shampoo.    Schedule lab appt in 3 months, and follow up in clinic in 6 months.

## 2023-01-05 NOTE — PROGRESS NOTES
[facility] Dermatology Note  Encounter Date: Jan 5, 2023  Office Visit     Dermatology Problem List:  1.  Alopecia areata, rapidly progressive, onset 10/2010 with scalp folliculitis. Xeljanz w/ breaks in treatment since 5/2017.   - Current: oral tofacitinib 5mg BID (plan to resume today), ketoconazole shampoo TIW, ILK injections (last 1/5/23)  2.  ROSARIO/JRA, improved with above. Follows with Dr. Black with Rheumatology; managed with tofacitinib monotherapy at this point.   3.  Mild dermatitis, ears. TAC cream  4. Onychodystrophy, right great toenail, s/p partial avulsion Nov 2019. PAS nail clipping 11/21/19 negative  5. Acute paronychia, right great toe, s/p keflex  ____________________________________________    Assessment & Plan:     1.  Alopecia areata. Previously rapidly progressive with secondary folliculitis with subsequent improvement on xeljanz 5mg BID (since 5/2017), but is now currently flaring due to inconsistent use and running out of medication. History of JRA/ROSARIO; both conditions significantly improved on tofacitinib.   - IL-K injections today (see procedure note below)   - Safety labs ordered: CBC, CMP, lipid panel (to be drawn today)  - Resume oral tofacitinib 5mg BID (order placed; initiation pending reassuring labs); sent message to Brionna Johnson for assistance with co-pay card   - Will hold off on starting topical steroids as patient was under better control previously while not using any thus there is a risk of atrophy without much benefit   - Continue OTC topical minoxidil      Procedures Performed:   - Intra-lesional triamcinolone procedure note. After verbal consent and review of risk of pain and skin thinning/atrophy, positioning and cleansing with isopropyl alcohol, 2 total mL of triamcinolone 10 mg/mL was injected into 4 lesion(s) (~25 injections) on the occipital scalp. The patient tolerated the procedure well and left the dermatology clinic in good  condition.    Follow-up: 3 month(s) for lab-only visit and 6 months in-person for f/u hair loss.    Staff and Resident:     Claudia Leo MD  PGY2 Dermatology Resident    I have personally examined this patient and agree with the resident doctor's documentation and plan of care. I have reviewed and amended the resident's note above. The documentation accurately reflects my clinical observations, diagnoses, treatment and follow-up plans. .I was present for key portions of the procedure.         Coco Olivo MD  Dermatology Staff    ____________________________________________    CC: Hair Loss (Xeljanx follow up.  Starting to get some patches in the back that are falling out since she's been out of medication.  )    HPI:  Ms. Eliane Lockhart is a(n) 62 year old female who presents today as a return patient for alopecia areata. She was last seen in our dermatology clinic on 9/30/21 (16 months ago), at which time Xeljanz was restarted, topical minoxidil continued, and ILK injected.    Eliane states that her  has noticed two new patches of hair loss on her scalp which developed shortly after running out of Xeljanz approximately 2 months ago. She states that her hair loss was previously well managed when using this medication twice daily. No associated scalp itching or discomfort noted. Eliane reports receiving Xeljanz through a payment assistance program. Additionally, she states that she ran out of ketoconazole shampoo two weeks ago, and previously used this every other day as her sole shampoo when showering. Has not been using the topical minoxidil recently.     Patient is otherwise feeling well, without additional skin concerns.    Labs Reviewed:  CBC (6/2022), CMP/Lipid panel (9/2021)    Physical Exam:  Vitals: There were no vitals taken for this visit.  SKIN: Focused examination of scalp was performed.  - Along the occipital scalp, there are four well demarcated round patches of hair loss without  perifollicular inflammation/scale noted.  - No other lesions of concern on areas examined.     Medications:  Current Outpatient Medications   Medication     albuterol (PROAIR HFA/PROVENTIL HFA/VENTOLIN HFA) 108 (90 Base) MCG/ACT inhaler     fluticasone-salmeterol (ADVAIR) 250-50 MCG/DOSE inhaler     HYDROcodone-acetaminophen (NORCO) 5-325 MG per tablet     ketoconazole (NIZORAL) 2 % external shampoo     montelukast (SINGULAIR) 10 MG tablet     simvastatin (ZOCOR) 80 MG tablet     tofacitinib (XELJANZ) 5 MG tablet     Current Facility-Administered Medications   Medication     triamcinolone acetonide (KENALOG-10) injection 15 mg     triamcinolone acetonide (KENALOG-10) injection 20 mg      Past Medical History:   Patient Active Problem List   Diagnosis     AA (alopecia areata)     Inflammatory arthritis     Dermatitis     No past medical history on file.    CC Madai Elizondo  [address] [place] [address] on close of this encounter.

## 2023-01-22 ENCOUNTER — HEALTH MAINTENANCE LETTER (OUTPATIENT)
Age: 63
End: 2023-01-22

## 2023-04-19 ENCOUNTER — LAB (OUTPATIENT)
Dept: LAB | Facility: CLINIC | Age: 63
End: 2023-04-19
Payer: COMMERCIAL

## 2023-04-19 DIAGNOSIS — L63.9 ALOPECIA AREATA: ICD-10-CM

## 2023-04-19 LAB
ALBUMIN SERPL BCG-MCNC: 4.2 G/DL (ref 3.5–5.2)
ALP SERPL-CCNC: 105 U/L (ref 35–104)
ALT SERPL W P-5'-P-CCNC: 19 U/L (ref 10–35)
ANION GAP SERPL CALCULATED.3IONS-SCNC: 9 MMOL/L (ref 7–15)
AST SERPL W P-5'-P-CCNC: 21 U/L (ref 10–35)
BASOPHILS # BLD AUTO: 0.1 10E3/UL (ref 0–0.2)
BASOPHILS NFR BLD AUTO: 1 %
BILIRUB SERPL-MCNC: 0.8 MG/DL
BUN SERPL-MCNC: 14.8 MG/DL (ref 8–23)
CALCIUM SERPL-MCNC: 9.6 MG/DL (ref 8.8–10.2)
CHLORIDE SERPL-SCNC: 106 MMOL/L (ref 98–107)
CHOLEST SERPL-MCNC: 199 MG/DL
CREAT SERPL-MCNC: 0.75 MG/DL (ref 0.51–0.95)
DEPRECATED HCO3 PLAS-SCNC: 27 MMOL/L (ref 22–29)
EOSINOPHIL # BLD AUTO: 0.5 10E3/UL (ref 0–0.7)
EOSINOPHIL NFR BLD AUTO: 6 %
ERYTHROCYTE [DISTWIDTH] IN BLOOD BY AUTOMATED COUNT: 12.5 % (ref 10–15)
GFR SERPL CREATININE-BSD FRML MDRD: 90 ML/MIN/1.73M2
GLUCOSE SERPL-MCNC: 105 MG/DL (ref 70–99)
HCT VFR BLD AUTO: 41 % (ref 35–47)
HDLC SERPL-MCNC: 70 MG/DL
HGB BLD-MCNC: 13.5 G/DL (ref 11.7–15.7)
IMM GRANULOCYTES # BLD: 0 10E3/UL
IMM GRANULOCYTES NFR BLD: 0 %
LDLC SERPL CALC-MCNC: 106 MG/DL
LYMPHOCYTES # BLD AUTO: 2 10E3/UL (ref 0.8–5.3)
LYMPHOCYTES NFR BLD AUTO: 25 %
MCH RBC QN AUTO: 30.6 PG (ref 26.5–33)
MCHC RBC AUTO-ENTMCNC: 32.9 G/DL (ref 31.5–36.5)
MCV RBC AUTO: 93 FL (ref 78–100)
MONOCYTES # BLD AUTO: 0.6 10E3/UL (ref 0–1.3)
MONOCYTES NFR BLD AUTO: 7 %
NEUTROPHILS # BLD AUTO: 4.9 10E3/UL (ref 1.6–8.3)
NEUTROPHILS NFR BLD AUTO: 61 %
NONHDLC SERPL-MCNC: 129 MG/DL
NRBC # BLD AUTO: 0 10E3/UL
NRBC BLD AUTO-RTO: 0 /100
PLATELET # BLD AUTO: 307 10E3/UL (ref 150–450)
POTASSIUM SERPL-SCNC: 4.2 MMOL/L (ref 3.4–5.3)
PROT SERPL-MCNC: 6.9 G/DL (ref 6.4–8.3)
RBC # BLD AUTO: 4.41 10E6/UL (ref 3.8–5.2)
SODIUM SERPL-SCNC: 142 MMOL/L (ref 136–145)
TRIGL SERPL-MCNC: 115 MG/DL
WBC # BLD AUTO: 8.1 10E3/UL (ref 4–11)

## 2023-04-19 PROCEDURE — 85025 COMPLETE CBC W/AUTO DIFF WBC: CPT | Performed by: PATHOLOGY

## 2023-04-19 PROCEDURE — 80053 COMPREHEN METABOLIC PANEL: CPT | Performed by: PATHOLOGY

## 2023-04-19 PROCEDURE — 36415 COLL VENOUS BLD VENIPUNCTURE: CPT | Performed by: PATHOLOGY

## 2023-04-19 PROCEDURE — 80061 LIPID PANEL: CPT | Performed by: PATHOLOGY

## 2023-10-07 ENCOUNTER — HEALTH MAINTENANCE LETTER (OUTPATIENT)
Age: 63
End: 2023-10-07

## 2023-10-26 ENCOUNTER — MEDICAL CORRESPONDENCE (OUTPATIENT)
Dept: HEALTH INFORMATION MANAGEMENT | Facility: CLINIC | Age: 63
End: 2023-10-26
Payer: COMMERCIAL

## 2024-01-05 ENCOUNTER — TELEPHONE (OUTPATIENT)
Dept: DERMATOLOGY | Facility: CLINIC | Age: 64
End: 2024-01-05
Payer: COMMERCIAL

## 2024-01-05 NOTE — TELEPHONE ENCOUNTER
PA Initiation    Medication: XELJANZ 5 MG PO TABS  Insurance Company: [employer] - Phone [phone] Fax [phone]  Pharmacy Filling the Rx: [facility] - [place], [place] - [facility] [address]  Filling Pharmacy Phone:    Filling Pharmacy Fax:    Start Date: 1/5/2024      Key: [id number]

## 2024-01-09 NOTE — TELEPHONE ENCOUNTER
Prior Authorization Approval    Medication: XELJANZ 5 MG PO TABS  Authorization Effective Date: 12/8/2023  Authorization Expiration Date: 1/8/2025  Approved Dose/Quantity: 60 per 30 days  Reference #: Key: [id number]   Insurance Company: [employer] - Phone [phone] Fax [phone]  Expected CoPay: $    CoPay Card Available: No    Financial Assistance Needed: no  Which Pharmacy is filling the prescription: [facility] - [place], [place] - [address]  Pharmacy Notified: yes  Patient Notified:

## 2024-01-10 NOTE — TELEPHONE ENCOUNTER
Called patient to let her know Xelnoelz was approved through her insurance. No answer, left VM.     Patient was last seen Jan 2023 therefore she is due for a follow up. Can we call her to get set up with a future follow up? Thanks!

## 2024-01-11 ENCOUNTER — TELEPHONE (OUTPATIENT)
Dept: DERMATOLOGY | Facility: CLINIC | Age: 64
End: 2024-01-11
Payer: COMMERCIAL

## 2024-01-11 NOTE — TELEPHONE ENCOUNTER
Via phone patient was schedule for the following:    Appointment type: Return  Provider: Dr. Nathan  Return date: 04-12-24  Specialty phone number: [phone]

## 2024-04-12 ENCOUNTER — OFFICE VISIT (OUTPATIENT)
Dept: DERMATOLOGY | Facility: CLINIC | Age: 64
End: 2024-04-12
Payer: COMMERCIAL

## 2024-04-12 ENCOUNTER — LAB (OUTPATIENT)
Dept: LAB | Facility: CLINIC | Age: 64
End: 2024-04-12
Payer: COMMERCIAL

## 2024-04-12 DIAGNOSIS — L63.9 AA (ALOPECIA AREATA): ICD-10-CM

## 2024-04-12 DIAGNOSIS — L30.9 DERMATITIS: ICD-10-CM

## 2024-04-12 DIAGNOSIS — L63.9 ALOPECIA AREATA: Primary | ICD-10-CM

## 2024-04-12 DIAGNOSIS — L63.9 ALOPECIA AREATA: ICD-10-CM

## 2024-04-12 LAB
ALBUMIN SERPL BCG-MCNC: 4.2 G/DL (ref 3.5–5.2)
ALP SERPL-CCNC: 101 U/L (ref 40–150)
ALT SERPL W P-5'-P-CCNC: 22 U/L (ref 0–50)
ANION GAP SERPL CALCULATED.3IONS-SCNC: 12 MMOL/L (ref 7–15)
AST SERPL W P-5'-P-CCNC: 24 U/L (ref 0–45)
BILIRUB SERPL-MCNC: 0.9 MG/DL
BUN SERPL-MCNC: 19.7 MG/DL (ref 8–23)
CALCIUM SERPL-MCNC: 9.3 MG/DL (ref 8.8–10.2)
CHLORIDE SERPL-SCNC: 104 MMOL/L (ref 98–107)
CHOLEST SERPL-MCNC: 208 MG/DL
CREAT SERPL-MCNC: 0.85 MG/DL (ref 0.51–0.95)
DEPRECATED HCO3 PLAS-SCNC: 24 MMOL/L (ref 22–29)
EGFRCR SERPLBLD CKD-EPI 2021: 77 ML/MIN/1.73M2
ERYTHROCYTE [DISTWIDTH] IN BLOOD BY AUTOMATED COUNT: 13 % (ref 10–15)
FASTING STATUS PATIENT QL REPORTED: YES
GLUCOSE SERPL-MCNC: 92 MG/DL (ref 70–99)
HCT VFR BLD AUTO: 40.7 % (ref 35–47)
HDLC SERPL-MCNC: 83 MG/DL
HGB BLD-MCNC: 13.5 G/DL (ref 11.7–15.7)
LDLC SERPL CALC-MCNC: 98 MG/DL
MCH RBC QN AUTO: 30.6 PG (ref 26.5–33)
MCHC RBC AUTO-ENTMCNC: 33.2 G/DL (ref 31.5–36.5)
MCV RBC AUTO: 92 FL (ref 78–100)
NONHDLC SERPL-MCNC: 125 MG/DL
PLATELET # BLD AUTO: 313 10E3/UL (ref 150–450)
POTASSIUM SERPL-SCNC: 4.5 MMOL/L (ref 3.4–5.3)
PROT SERPL-MCNC: 7.2 G/DL (ref 6.4–8.3)
RBC # BLD AUTO: 4.41 10E6/UL (ref 3.8–5.2)
SODIUM SERPL-SCNC: 140 MMOL/L (ref 135–145)
TRIGL SERPL-MCNC: 133 MG/DL
WBC # BLD AUTO: 10.1 10E3/UL (ref 4–11)

## 2024-04-12 PROCEDURE — 80061 LIPID PANEL: CPT | Performed by: PATHOLOGY

## 2024-04-12 PROCEDURE — 80053 COMPREHEN METABOLIC PANEL: CPT | Performed by: PATHOLOGY

## 2024-04-12 PROCEDURE — 11901 INJECT SKIN LESIONS >7: CPT | Performed by: PHYSICIAN ASSISTANT

## 2024-04-12 PROCEDURE — 85027 COMPLETE CBC AUTOMATED: CPT | Performed by: PATHOLOGY

## 2024-04-12 PROCEDURE — 36415 COLL VENOUS BLD VENIPUNCTURE: CPT | Performed by: PATHOLOGY

## 2024-04-12 PROCEDURE — 99214 OFFICE O/P EST MOD 30 MIN: CPT | Mod: 25 | Performed by: PHYSICIAN ASSISTANT

## 2024-04-12 RX ORDER — HYDROCORTISONE 25 MG/G
OINTMENT TOPICAL 2 TIMES DAILY
Qty: 30 G | Refills: 1 | Status: SHIPPED | OUTPATIENT
Start: 2024-04-12 | End: 2024-04-12

## 2024-04-12 RX ORDER — HYDROCORTISONE 25 MG/G
OINTMENT TOPICAL 2 TIMES DAILY
Qty: 30 G | Refills: 1 | Status: SHIPPED | OUTPATIENT
Start: 2024-04-12 | End: 2024-04-29

## 2024-04-12 ASSESSMENT — PAIN SCALES - GENERAL: PAINLEVEL: NO PAIN (0)

## 2024-04-12 NOTE — PROGRESS NOTES
[facility] Dermatology Note  Encounter Date: Apr 12, 2024  Office Visit     Reviewed patients past medical history and pertinent chart review prior to patients visit today.     Dermatology Problem List:  1.  Alopecia areata, rapidly progressive, onset 10/2010 with scalp folliculitis. Xeljanz w/ breaks in treatment since 5/2017.   - Current: oral tofacitinib 5mg BID (plan to resume today), ketoconazole shampoo TIW, ILK injections (last 4/12/2024)  2.  ROSARIO/JRA, improved with above. Follows with Dr. Black with Rheumatology; managed with tofacitinib monotherapy at this point.   3.  Mild dermatitis, ears- TAC cream, eyelids - hydrocortisone  4. Onychodystrophy, right great toenail, s/p partial avulsion Nov 2019. PAS nail clipping 11/21/19 negative  5. Acute paronychia, right great toe, s/p keflex    ____________________________________________    Assessment & Plan:     # Alopecia areata, currently managed on Tofacitinib  - The patient denies any major infections, cardiac events, thrombosis, or GI perforations since the last visit. We discussed risks and benefits of continuing Tofacitinib and the patient elected to continue. We discussed the option of ILK and the patient elected to proceed,   - Will obtain CBC, CMP, and lipids today. Standing CBC and CMP every 3 months placed.   - Patient notified she will be receiving an MTM consult.    Procedures Performed:   - Intra-lesional triamcinolone procedure note. After verbal consent and review of risk of pain and skin thinning/atrophy, 2 total mL of triamcinolone 10 mg/mL was injected into 20 lesion(s) on the occipital and frontal hairlines, right parietal scalp x 2. The patient tolerated the procedure well and left the dermatology clinic in good condition.      # Eyelid dermatitis  - Hydrocortisone 2.5% ointment twice daily as needed for flares, up to 5 days. We discussed potential side effects of topical steroids including skin atrophy.   - Vaseline  daily.     All risks, benefits and alternatives were discussed with patient.  Patient is in agreement and understands the assessment and plan.  All questions were answered.  Lisa Nathan PA-C  [facility] Dermatology  _______________________________________    CC: Medication Refill (Amanda is here today for a hair loss follow up and medication refill )    HPI:  Ms. Eliane Lockhart is a(n) 64 year old female who presents today as a return patient for alopecia areata.  The patient reports that the Tofacitinib is very helpful in maintaining her hair.  She is unable to obtain coverage for the full year, and stop the medication approximately 5 months ago.  She denies any side effects from the medication.  No major infections, cardiac events, thrombosis, or GI perforations.  Patient is otherwise feeling well, without additional skin concerns.      Physical Exam:  SKIN: Focused examination of scalp was performed.  -Patches of hair loss involving the occipital and frontal hairlines, right parietal scalp x 2    - No other lesions of concern on areas examined.     Medications:  Current Outpatient Medications   Medication Sig Dispense Refill    albuterol (PROAIR HFA/PROVENTIL HFA/VENTOLIN HFA) 108 (90 Base) MCG/ACT inhaler       fluticasone-salmeterol (ADVAIR) 250-50 MCG/DOSE inhaler       HYDROcodone-acetaminophen (NORCO) 5-325 MG per tablet       ketoconazole (NIZORAL) 2 % external shampoo Three times weekly: lather into scalp, leave in place for 3-5 minutes, then rinse. 120 mL 11    montelukast (SINGULAIR) 10 MG tablet daily       simvastatin (ZOCOR) 80 MG tablet daily       tofacitinib (XELJANZ) 5 MG tablet Take 1 tablet (5 mg) by mouth 2 times daily 60 tablet 5     Current Facility-Administered Medications   Medication Dose Route Frequency Provider Last Rate Last Admin    triamcinolone acetonide (KENALOG-10) injection 15 mg  15 mg Intra-Lesional Once Stella Batres MD          Past Medical History:    Patient Active Problem List   Diagnosis    AA (alopecia areata)    Inflammatory arthritis    Dermatitis     No past medical history on file.    CC Juan Dwyer MD  [address] [place] [address] on close of this encounter.

## 2024-04-12 NOTE — PROGRESS NOTES
Drug Administration Record    Prior to injection, verified patient identity using patient's name and date of birth.  Due to injection administration, patient instructed to remain in clinic for 15 minutes  afterwards, and to report any adverse reaction to me immediately.    Drug Name: triamcinolone acetonide(kenalog)  Dose: 2mL of triamcinolone 10mg/mL, 20mg dose  Route administered: ID  NDC #: 1854-7954-79  Amount of waste(mL):3  Reason for waste: Single use vial    LOT #: [id number]  SITE: see chart  : [employer]  EXPIRATION DATE: 12/2025

## 2024-04-12 NOTE — NURSING NOTE
Dermatology Rooming Note    Eliane Lockhart's goals for this visit include:   Chief Complaint   Patient presents with    Medication Refill     Amanda is here today for a hair loss follow up and medication refill      AYANA Schultz

## 2024-04-12 NOTE — LETTER
4/12/2024       RE: Eliane Lockhart  [address]  [facility] [address]     Dear Colleague,    Thank you for referring your patient, Eliane Lockhart, to the [facility] DERMATOLOGY CLINIC [place] at [facility]. Please see a copy of my visit note below.    [facility] Dermatology Note  Encounter Date: Apr 12, 2024  Office Visit     Reviewed patients past medical history and pertinent chart review prior to patients visit today.     Dermatology Problem List:  1.  Alopecia areata, rapidly progressive, onset 10/2010 with scalp folliculitis. Xeljanz w/ breaks in treatment since 5/2017.   - Current: oral tofacitinib 5mg BID (plan to resume today), ketoconazole shampoo TIW, ILK injections (last 4/12/2024)  2.  ROSARIO/JRA, improved with above. Follows with Dr. Black with Rheumatology; managed with tofacitinib monotherapy at this point.   3.  Mild dermatitis, ears- TAC cream, eyelids - hydrocortisone  4. Onychodystrophy, right great toenail, s/p partial avulsion Nov 2019. PAS nail clipping 11/21/19 negative  5. Acute paronychia, right great toe, s/p keflex    ____________________________________________    Assessment & Plan:     # Alopecia areata, currently managed on Tofacitinib  - The patient denies any major infections, cardiac events, thrombosis, or GI perforations since the last visit. We discussed risks and benefits of continuing Tofacitinib and the patient elected to continue. We discussed the option of ILK and the patient elected to proceed,   - Will obtain CBC, CMP, and lipids today. Standing CBC and CMP every 3 months placed.   - Patient notified she will be receiving an MTM consult.    Procedures Performed:   - Intra-lesional triamcinolone procedure note. After verbal consent and review of risk of pain and skin thinning/atrophy, 2 total mL of triamcinolone 10 mg/mL was injected into 20 lesion(s) on the occipital and frontal  hairlines, right parietal scalp x 2. The patient tolerated the procedure well and left the dermatology clinic in good condition.      # Eyelid dermatitis  - Hydrocortisone 2.5% ointment twice daily as needed for flares, up to 5 days. We discussed potential side effects of topical steroids including skin atrophy.   - Vaseline daily.     All risks, benefits and alternatives were discussed with patient.  Patient is in agreement and understands the assessment and plan.  All questions were answered.  Lisa Nathan PA-C  [facility]  _______________________________________    CC: Medication Refill (Amanda is here today for a hair loss follow up and medication refill )    HPI:  Ms. Eliane Lockhart is a(n) 64 year old female who presents today as a return patient for alopecia areata.  The patient reports that the Tofacitinib is very helpful in maintaining her hair.  She is unable to obtain coverage for the full year, and stop the medication approximately 5 months ago.  She denies any side effects from the medication.  No major infections, cardiac events, thrombosis, or GI perforations.  Patient is otherwise feeling well, without additional skin concerns.      Physical Exam:  SKIN: Focused examination of scalp was performed.  -Patches of hair loss involving the occipital and frontal hairlines, right parietal scalp x 2    - No other lesions of concern on areas examined.     Medications:  Current Outpatient Medications   Medication Sig Dispense Refill    albuterol (PROAIR HFA/PROVENTIL HFA/VENTOLIN HFA) 108 (90 Base) MCG/ACT inhaler       fluticasone-salmeterol (ADVAIR) 250-50 MCG/DOSE inhaler       HYDROcodone-acetaminophen (NORCO) 5-325 MG per tablet       ketoconazole (NIZORAL) 2 % external shampoo Three times weekly: lather into scalp, leave in place for 3-5 minutes, then rinse. 120 mL 11    montelukast (SINGULAIR) 10 MG tablet daily       simvastatin (ZOCOR) 80 MG tablet daily       tofacitinib  (XELJANZ) 5 MG tablet Take 1 tablet (5 mg) by mouth 2 times daily 60 tablet 5     Current Facility-Administered Medications   Medication Dose Route Frequency Provider Last Rate Last Admin    triamcinolone acetonide (KENALOG-10) injection 15 mg  15 mg Intra-Lesional Once Stella Batres MD          Past Medical History:   Patient Active Problem List   Diagnosis    AA (alopecia areata)    Inflammatory arthritis    Dermatitis     No past medical history on file.    CC Juan Dwyer MD  [address] [place] [address] on close of this encounter.

## 2024-04-29 ENCOUNTER — TELEPHONE (OUTPATIENT)
Dept: RHEUMATOLOGY | Facility: CLINIC | Age: 64
End: 2024-04-29
Payer: COMMERCIAL

## 2024-04-29 ENCOUNTER — VIRTUAL VISIT (OUTPATIENT)
Dept: RHEUMATOLOGY | Facility: CLINIC | Age: 64
End: 2024-04-29
Attending: PHYSICIAN ASSISTANT
Payer: COMMERCIAL

## 2024-04-29 DIAGNOSIS — M19.90 INFLAMMATORY ARTHRITIS: Primary | ICD-10-CM

## 2024-04-29 DIAGNOSIS — E78.5 HYPERLIPIDEMIA: ICD-10-CM

## 2024-04-29 DIAGNOSIS — J45.909 ASTHMA: ICD-10-CM

## 2024-04-29 DIAGNOSIS — L30.9 DERMATITIS: ICD-10-CM

## 2024-04-29 DIAGNOSIS — L63.9 AA (ALOPECIA AREATA): ICD-10-CM

## 2024-04-29 RX ORDER — ALBUTEROL SULFATE 0.83 MG/ML
2.5 SOLUTION RESPIRATORY (INHALATION) EVERY 6 HOURS PRN
COMMUNITY

## 2024-04-29 RX ORDER — IBUPROFEN 200 MG
400 TABLET ORAL 3 TIMES DAILY PRN
COMMUNITY

## 2024-04-29 NOTE — PROGRESS NOTES
Medication Therapy Management (MTM) Encounter    ASSESSMENT:                            Medication Adherence/Access: No issues identified.    Inflammatory arthritis, Dermatitis, Alopecia areata: Provided education on Xeljanz (tofacitinib) today including dosing, general administration, side effects (both common/serious), precautions, monitoring and time to efficacy. Encouraged indicated non-live vaccines and avoidance of live vaccines. Discussed potential need to hold therapy in the setting of signs/symptoms of active infection. Encouraged patient to contact the Dermatology clinic in the event they have questions. Would benefit from continuing Xeljanz once it arrives.     Asthma: Stable, controlled.      Hyperlipidemia: Cholesterol has been stable and borderline elevated over time. May consider switching to a high-intensity statin (atorvastatin 40-80 mg or rosuvastatin 20-40 mg daily) for added lipid lowering effect at future visit; should consider that these may be covered differently under her insurance and also may have less risk for potential adverse effects compared to simvastatin (although no adverse effects for her at this time).     PLAN:                            Continue current regimen; MTM to reach out to liaison team to consider access options for Xeljanz.   Consider receiving the following vaccinations: COVID-19 booster, shingles (Shingrix; 2nd dose in 2-dose series), Hepatitis B series (based on labs from 2017), and RSV.   MTM to pend Quant TB lab test; patient can have collected at any [place] lab with an appointment.     Follow-up: 8/9/24 or earlier as needed.    SUBJECTIVE/OBJECTIVE:                          Amanda Lockhart is a 64 year old female called for an initial visit. She was referred to me from Lisa Nathan PA-C.      Reason for visit: Xeljanz (tofacitinib) continuation.    Allergies/ADRs: Reviewed in chart and with patient.  Past Medical History: Reviewed in chart.  Tobacco: She reports  that she has never smoked. She has never used smokeless tobacco.  Alcohol: Very rarely.  Notes: annual income ~$122,000 for household of 2. Works from home Mondays and Fridays; may be at the cabin some of these days this summer. Prefers morning (8 am) visits.    Medication Adherence/Access: See below regarding Xeljanz.    Inflammatory arthritis, Dermatitis, Alopecia areata:   - Ketoconazole 2% shampoo for alopecia.  - Simvastatin 80 mg once daily hd.  - Xeljanz (tofacitinib) 5 mg twice daily.  - Hydrocodone-acetaminophen 5-325 mg as needed.  - Ibuprofen 200 mg x 2 tablets three times daily as needed.     04/29/24: Patient notes she has a 4-5 month access gap with Xeljanz each year. She has health insurance but the deductible is too high so she goes through a coupon card, but it runs out each year. Notes that prior to Xeljanz she lost all of her hair; she has also had arthritis since she was 8 and goes through periods of bad flares. Notes that the Xeljanz has been life changing for her arthritis pain. Additionally patient notes she has been able to lose weight and can now walk without a limp. Notes needing the Norco about once yearly for intolerable pain which only happens when she is not on her Xeljanz (one bottle given per year and it is enough for her). Ibuprofen she tries not to take for more than 2 days at a time to reduce exposure to NSAIDs. Was sick for a couple of months this winter, which felt unusual, but she hasn't taken Xeljanz since last fall. Patient would like a repeat quant TB test to be completed. The ketoconazole shampoo has been helpful to reduce hair loss. She used the hydrocortisone 2.5% on eyelid dermatitis and it resolved; no longer using.     Specialist: Lisa Nathan PA-C , Dermatology. Last visit on 4/12/24. The following was recommended:   # Alopecia areata, currently managed on Tofacitinib  - The patient denies any major infections, cardiac events, thrombosis, or GI perforations since  the last visit. We discussed risks and benefits of continuing Tofacitinib and the patient elected to continue. We discussed the option of ILK and the patient elected to proceed,   - Will obtain CBC, CMP, and lipids today. Standing CBC and CMP every 3 months placed.   - Patient notified she will be receiving an MTM consult.  # Eyelid dermatitis  - Hydrocortisone 2.5% ointment twice daily as needed for flares, up to 5 days. We discussed potential side effects of topical steroids including skin atrophy.   - Vaseline daily.     Pre-Biologic Screenings      Hep B Surface Antibody Non-reactive (6/29/17)    Hep B Core Antibody  No record of completion    Hep B Surface Antigen Non-reactive (6/29/17)    Hep C Antibody  Non-reactive (6/29/17)    HIV Antigen Antibody Non-reactive (6/29/17)    Quantiferon TB Gold Negative (5/16/19)    CBC 4/12/24   CMP 4/12/24 (eGFR 77 mL/min)     Immunization History   Covid-19 vaccine (2189-2461 version)  Due to receive   Influenza (annual, 2867-5478) Complete, avoid live FluMist   Tetanus/Tdap Up-to-date   Pneumococcal  Prevnar-20: 8/28/32 Complete   Shingrix Due for second dose   Hepatitis B Due to receive based on last serologies   RSV (only for ? 60 years old)  Due to receive   All patients on biologics should avoid live vaccines (varicella/VZV, intranasal influenza, MMR, or yellow fever vaccine (if traveling))       Asthma:   - Albuterol inhaler or nebulizer as needed.  - Advair (fluticasone-salmeterol) 250-50 mcg/dose inhaler used twice daily.  - Singulare (montelukast) 10 mg once daily at bedtime.    Patient notes since recovering from a cough/sickness this winter, she has not needed her albuterol (in either form) and has had symptoms control with her generic Advair and Singular.      Hyperlipidemia:   - Simvastatin 80 mg at bedtime.     Patient reports no current medication side effects, including unexplained muscle pain.    Today's Vitals: There were no vitals taken for this  visit.  ----------------  I spent 45 minutes with this patient today. All changes were made via collaborative practice agreement with Lisa Nathan PA-C . A copy of the visit note was provided to the patient's provider(s).    A summary of these recommendations was sent via [email or web address].    Paola Robledo, Pharm.D.  Medication Therapy Management Pharmacist   [facility] Dermatology    Telemedicine Visit Details  Type of service:  Telephone visit  Start Time: 8:45 AM - Phones would not connect initially; coordinated an alternative number to call via [email or web address].   End Time: 9:30 AM     Medication Therapy Recommendations  No medication therapy recommendations to display

## 2024-04-29 NOTE — Clinical Note
4/29/2024       RE: Eliane Lockhart  [address]  [facility] [address]     Dear Colleague,    Thank you for referring your patient, Eliane Lockhart, to the [facility] RHEUMATOLOGY CLINIC [place] at [facility]. Please see a copy of my visit note below.    Medication Therapy Management (MTM) Encounter    ASSESSMENT:                            Medication Adherence/Access: {adherencechoices:980197}.    ***:  Provided education on *** today including dosing, general administration, side effects (both common/serious), precautions, monitoring and time to efficacy. {Immunosuppresing?:266286} Encouraged patient to contact the Dermatology clinic in the event they have questions. Would benefit from starting *** once it arrives.    PLAN:                            Order for *** sent to *** to be assessed for insurance coverage.   Consider receiving the following vaccinations: {Vaccines:263592}.     Follow-up: ***    [id number]    SUBJECTIVE/OBJECTIVE:                          Amanda Lockhart is a 64 year old female called for an initial visit. She was referred to me from Lisa Nathan PA-C.      Reason for visit: Tofacitinib continuation.    Allergies/ADRs: Reviewed in chart and with patient.  Past Medical History: Reviewed in chart.  Tobacco: She reports that she has never smoked. She has never used smokeless tobacco.  Alcohol: very rarely.    Medication Adherence/Access: {fumedadherence:031956}.    ***:   ***    ***    4-5 month gap each eary. Health insurance but huge deductable (7000) 2300 per month). Now gets through coupon card but it runs out. Lost all hair and was sent here and arthritis since 8 and goes trhough periods that it flares really badly. Life changing for arthritis pain. Able to lose weight, now can walk without limping. Foot will sweal up.     Singulare 1qd hs. advair 1 puff twice daily. and albuterol (only when sick).   Norco - once yearly  for intolerable pain one bottle per year. Not used at all. If on xeljanze pain is management  Ibuprofen 200 mg tablets -  not more than 2 days at a time.   Nebuliser as needed very infrequent.     Xeljanz not taking since last fall. Back on xeljanz April 18th.     Simvastatin 80 mg no weird muscle pains.  Ketoconazole shampoo - for alopecia. Gental shampoo. Has less hair loss on this one   Hydrocortisone 2.5% ointment on eyelids -- now fine. Works well.     Does have ashtma did get sick for a couple months this winter which felt unusual. 2 rounds of steroids, maybe 3 and by the time she went in a month ago but pnemonia. Preventitive inhaler for asthma    Specialist: Lisa Nathan PA-C , Dermatology. Last visit on ***. The following was recommended:   ***    Previous treatment:   ***    Pre-Biologic Screenings      Hep B Surface Antibody {DERM RESULTS:501164} (***)    Hep B Core Antibody  {DERM RESULTS:925895} (***)    Hep B Surface Antigen {DERM RESULTS:786790} (***)    Hep C Antibody  {DERM RESULTS:087943} (***)    HIV Antigen Antibody {DERM RESULTS:950596} (***)    Quantiferon TB Gold {DERM RESULTS:945840} (***)    CBC ***   CMP *** (eGFR *** mL/min)     Immunization History   Covid-19 vaccine (6083-4328 version)  Due to receive   Influenza (annual, 3636-1567) Complete, avoid live FluMist   Tetanus/Tdap Complete   Pneumococcal  Prevnar-13: ***  Pneumovax-23: ***   Prevnar-20: *** {Status:194356}   Shingrix {Status:271720}   Hepatitis B {Status:879477}   RSV (only for ? 60 years old)  {Status:631915}   All patients on biologics should avoid live vaccines (varicella/VZV, intranasal influenza, MMR, or yellow fever vaccine (if traveling))       ***    TB order.     Today's Vitals: There were no vitals taken for this visit.  ----------------  {RANJANA?:940726}    I spent *** minutes with this patient today. All changes were made via collaborative practice agreement with {Dermatology Providers:462788}. A copy of the visit  "note was provided to the patient's provider(s).    A summary of these recommendations was sent via [employer].    Paola Robledo, Pharm.D.  Medication Therapy Management Pharmacist   [facility]    Telemedicine Visit Details  Type of service:  {telemedvisitmtm:177344::\"Telephone visit\"}  Start Time: {video/phone visit start time:152948}  End Time: {video/phone visit end time:152948}     Medication Therapy Recommendations  No medication therapy recommendations to display      Medication Therapy Management (MTM) Encounter    ASSESSMENT:                            Medication Adherence/Access: No issues identified.    ***:  Provided education on *** today including dosing, general administration, side effects (both common/serious), precautions, monitoring and time to efficacy. {Immunosuppresing?:088158} Encouraged patient to contact the Dermatology clinic in the event they have questions. Would benefit from starting *** once it arrives.    PLAN:                            Order for *** sent to *** to be assessed for insurance coverage.   Consider receiving the following vaccinations: {Vaccines:088381}.     Follow-up: ***    [id number]    SUBJECTIVE/OBJECTIVE:                          Amanda Lockhart is a 64 year old female called for an initial visit. She was referred to me from Lisa Nathna PA-C.      Reason for visit: Tofacitinib continuation.    Allergies/ADRs: Reviewed in chart and with patient.  Past Medical History: Reviewed in chart.  Tobacco: She reports that she has never smoked. She has never used smokeless tobacco.  Alcohol: Very rarely.    Medication Adherence/Access: {fumedaerence:932009}.    ***:   ***    ***    4-5 month gap each eary. Health insurance but huge deductable (7000) 2300 per month). Now gets through coupon card but it runs out. Lost all hair and was sent here and arthritis since 8 and goes trhough periods that it flares really badly. Life changing for arthritis pain. Able to lose " weight, now can walk without limping. Foot will sweal up.     Singulare 1qd hs. advair 1 puff twice daily. and albuterol (only when sick).   Norco - once yearly for intolerable pain one bottle per year. Not used at all. If on xeljanze pain is management  Ibuprofen 200 mg tablets -  not more than 2 days at a time.   Nebuliser as needed very infrequent.     Xeljanz not taking since last fall. Back on xeljanz April 18th.     Simvastatin 80 mg no weird muscle pains.  Ketoconazole shampoo - for alopecia. Gental shampoo. Has less hair loss on this one   Hydrocortisone 2.5% ointment on eyelids -- now fine. Works well.     Does have ashtma did get sick for a couple months this winter which felt unusual. 2 rounds of steroids, maybe 3 and by the time she went in a month ago but pnemonia. Preventitive inhaler for asthma    Specialist: Lisa Nathan PA-C , Dermatology. Last visit on ***. The following was recommended:   ***    Previous treatment:   ***    Pre-Biologic Screenings      Hep B Surface Antibody {DERM RESULTS:425351} (***)    Hep B Core Antibody  {DERM RESULTS:539340} (***)    Hep B Surface Antigen {DERM RESULTS:496219} (***)    Hep C Antibody  {DERM RESULTS:193424} (***)    HIV Antigen Antibody {DERM RESULTS:033561} (***)    Quantiferon TB Gold {DERM RESULTS:173207} (***)    CBC ***   CMP *** (eGFR *** mL/min)     Immunization History   Covid-19 vaccine (7449-1933 version)  Due to receive   Influenza (annual, 0291-8164) Complete, avoid live FluMist   Tetanus/Tdap Complete   Pneumococcal  Prevnar-13: ***  Pneumovax-23: ***   Prevnar-20: *** {Status:887844}   Shingrix {Status:846173}   Hepatitis B {Status:059528}   RSV (only for = 60 years old)  {Status:160840}   All patients on biologics should avoid live vaccines (varicella/VZV, intranasal influenza, MMR, or yellow fever vaccine (if traveling))       ***    TB order.     Today's Vitals: There were no vitals taken for this  "visit.  ----------------  {RANJANA?:122345}    I spent *** minutes with this patient today. All changes were made via collaborative practice agreement with {Dermatology Providers:248344}. A copy of the visit note was provided to the patient's provider(s).    A summary of these recommendations was sent via [employer].    Paola Robledo, Pharm.D.  Medication Therapy Management Pharmacist   [facility] Dermatology    Telemedicine Visit Details  Type of service:  {telemedvisitM[facility] Medical Services:359140::\"Telephone visit\"}  Start Time: {video/phone visit start time:152948}  End Time: {video/phone visit end time:152948}     Medication Therapy Recommendations  No medication therapy recommendations to display        Again, thank you for allowing me to participate in the care of your patient.      Sincerely,    Paola Robledo [facility]    "

## 2024-04-29 NOTE — Clinical Note
MELBA Gonzalez -- I met with Amanda this week. I am looping our liaison team in too so we can brainstorm how we can avoid coverage gaps for her in the future with her Xeljanz. I also ordered a future quant tb test per patient request and okay to do since last one was 2019. Thanks!  Param Bennett, this patient notes her coupon card runs out each year leading to a 4-5 month gap in therapy with her Xeljanz, but she finds this med SO helpful for both her joint pain and her alopecia. Can you check to see if there is more we can do to assure her access for a full year? My note has more detail as needed.

## 2024-04-29 NOTE — TELEPHONE ENCOUNTER
Attempted to reach patient for today's virtual MTM visit without success. Left voicemail requesting patient to call MTM scheduling line ([phone]) to reschedule appointment. Sent patient Freebeep[employer]hart message requesting patient reschedule MTM appointment.    Paola Robledo, Pharm.D.  Medication Therapy Management Pharmacist   [facility]

## 2024-05-01 NOTE — PATIENT INSTRUCTIONS
"Recommendations from today's MTM visit:                                                    MTM (medication therapy management) is a service provided by a clinical pharmacist designed to help you get the most of out of your medicines.      I will reach out to our liaison team to discuss access options for your Xeljanz.   Consider receiving the following vaccinations: COVID-19 booster, shingles (Shingrix; 2nd dose in 2-dose series), Hepatitis B series (based on labs from 2017), and RSV.   I have pended your Quant TB lab test; you can complete this at any [place] lab (most prefer appointments).    Follow-up: scheduled for 8/9/24 or earlier as needed.    It was great speaking with you today.  I value your experience and would be very thankful for your time in providing feedback in our clinic survey. In the next few days, you may receive an email or text message from [employer] with a link to a survey related to your  clinical pharmacist.\"     To schedule another MTM appointment, please call the clinic directly or you may call the MTM scheduling line at [phone].    My Clinical Pharmacist's contact information:                                                      Please feel free to contact me with any questions or concerns you have.      Paola Robledo, Pharm.D.  Medication Therapy Management Pharmacist   [facility] Dermatology  MTM Scheduling Line: [phone]    "

## 2024-05-31 DIAGNOSIS — L63.9 ALOPECIA AREATA: ICD-10-CM

## 2024-06-05 RX ORDER — KETOCONAZOLE 20 MG/ML
SHAMPOO TOPICAL
Qty: 120 ML | Refills: 11 | Status: SHIPPED | OUTPATIENT
Start: 2024-06-05

## 2024-06-05 NOTE — TELEPHONE ENCOUNTER
LVD:  4/12/2024  [facility] Lisa Gilmore PA-C  Dermatology     Refilled per protocol #1.

## 2024-08-14 ENCOUNTER — TELEPHONE (OUTPATIENT)
Dept: PHARMACY | Facility: CLINIC | Age: 64
End: 2024-08-14
Payer: COMMERCIAL

## 2024-08-14 NOTE — TELEPHONE ENCOUNTER
Left Voicemail (2nd Attempt) for the patient to call back and schedule the following:    Appointment type: MTM Return Telephone  Provider: Paola Robledo  Return date: 8/20/24  Specialty phone number: [phone]  Additional appointment(s) needed: None     Additonal Notes: Slot on hold for pt on 8/20 at 2:00 pm, telephone visit

## 2024-08-20 ENCOUNTER — VIRTUAL VISIT (OUTPATIENT)
Dept: DERMATOLOGY | Facility: CLINIC | Age: 64
End: 2024-08-20
Attending: PHARMACIST
Payer: COMMERCIAL

## 2024-08-20 DIAGNOSIS — M19.90 INFLAMMATORY ARTHRITIS: Primary | ICD-10-CM

## 2024-08-20 DIAGNOSIS — L30.9 DERMATITIS: ICD-10-CM

## 2024-08-20 DIAGNOSIS — L63.9 AA (ALOPECIA AREATA): ICD-10-CM

## 2024-08-20 RX ORDER — FLUTICASONE PROPIONATE AND SALMETEROL 250; 50 UG/1; UG/1
1 POWDER RESPIRATORY (INHALATION) EVERY 12 HOURS
COMMUNITY

## 2024-08-20 NOTE — PROGRESS NOTES
Medication Therapy Management (MTM) Encounter    ASSESSMENT:                            Medication Adherence/Access: No issues identified.    Inflammatory arthritis, Dermatitis, Alopecia areata: Progressing towards goals; continue current therapy. Would benefit from connecting with liaison team to discuss coverage options going forward.     PLAN:                            Connect patient with pharmacy liaison to discuss the following in regards to Xeljanz:  Amount remaining on coupon card  Qualification for PAP  Appropriateness of PTR for 2024 and/or 2025 (did discuss this in today's visit, but requires more research on coupon card funds vs deductible and what patient would need to have available for temporary out of pocket costs).  Consider receiving the following vaccination(s) now: second dose of Shingrix (2 dose-series).    Follow-up: [facility] 4/15/25; MTM 1/6/25    SUBJECTIVE/OBJECTIVE:                          Amanda Lockhart is a 64 year old female called for a follow-up visit.      Reason for visit: Xeljanz check in.    Allergies/ADRs: Reviewed in chart.  Past Medical History: Reviewed in chart.  Tobacco: She reports that she has never smoked. She has never used smokeless tobacco.  Alcohol: Reviewed in chart.    Medication Adherence/Access: no issues reported.    Inflammatory arthritis, Dermatitis, Alopecia areata:   - Ketoconazole 2% shampoo for alopecia every day (not as needed).  - Xeljanz (tofacitinib) 5 mg twice daily.  - Hydrocodone-acetaminophen 5-325 mg as needed.  - Ibuprofen 200 mg x 2 tablets three times daily as needed.     08/20/24: Patient reports effect of current regimen and denies potential adverse effects including headaches. Patient reports symptoms are greatly improved when on Xeljanz. 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 Did get COVID-19 (terrible headaches; did receive Paxlovid) in July and a bad  cold early in the year (required prednisone). Noticed a new lump behind right knee (and some swelling on left leg); not associated with pain unless with pressure; open to talking about it with primary care provider. 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 Pain used to cause a limp, but since starting Xeljanz, limp is gone. Open to PTR program, but deductible is large (~$5000); patient going to investigate where she is at with this..     04/29/24: Patient notes she has a 4-5 month access gap with Xeljanz each year. She has health insurance but the deductible is too high so she goes through a coupon card, but it runs out each year. Notes that prior to Xeljanz she lost all of her hair; she has also had arthritis since she was 8 and goes through periods of bad flares. Notes that the Xeljanz has been life changing for her arthritis pain. Additionally patient notes she has been able to lose weight and can now walk without a limp. Notes needing the Norco about once yearly for intolerable pain which only happens when she is not on her Xeljanz (one bottle given per year and it is enough for her). Ibuprofen she tries not to take for more than 2 days at a time to reduce exposure to NSAIDs. Was sick for a couple of months this winter, which felt unusual, but she hasn't taken Xeljanz since last fall. Patient would like a repeat quant TB test to be completed. The ketoconazole shampoo has been helpful to reduce hair loss. She used the hydrocortisone 2.5% on eyelid dermatitis and it resolved; no longer using.      Specialist: Lisa Nathan PA-C , Dermatology. Last visit on 4/12/24. The following was recommended:   # Alopecia areata, currently managed on Tofacitinib  - The patient denies any major infections, cardiac events, thrombosis, or GI perforations since the last  visit. We discussed risks and benefits of continuing Tofacitinib and the patient elected to continue. We discussed the option of ILK and the patient elected to proceed,   - Will obtain CBC, CMP, and lipids today. Standing CBC and CMP every 3 months placed.   - Patient notified she will be receiving an MTM consult.  # Eyelid dermatitis  - Hydrocortisone 2.5% ointment twice daily as needed for flares, up to 5 days. We discussed potential side effects of topical steroids including skin atrophy.   - Vaseline daily.     Pre-Biologic Screenings      Hep B Surface Antibody Non-reactive (6/29/17)    Hep B Core Antibody  No record of completion    Hep B Surface Antigen Non-reactive (6/29/17)    Hep C Antibody  Non-reactive (6/29/17)    HIV Antigen Antibody Non-reactive (6/29/17)    Quantiferon TB Gold Negative (5/16/19)    CBC 4/12/24   CMP 4/12/24 (eGFR 77 mL/min)      Immunization History   Covid-19 vaccine  Consider vaccine in 2024-25 season   Influenza (annual) Consider vaccine in 2024-25 season, avoid live FluMist   Tetanus/Tdap Up-to-date   Pneumococcal  Prevnar-20: 8/28/32 Complete   Shingrix Due to receive second dose   Hepatitis B vaccine Due to receive based on last serologies   RSV (only for ? 60 years old)  Consider vaccine in 2024-25 season   All patients on biologics should avoid live vaccines (varicella/VZV, intranasal influenza, MMR, or yellow fever vaccine (if traveling))     Today's Vitals: There were no vitals taken for this visit.  ----------------  I spent 40 minutes with this patient today. All changes were made via collaborative practice agreement with Lisa Nathan PA-C . A copy of the visit note was provided to the patient's provider(s).    A summary of these recommendations was sent via [employer].    Paola Robledo, Pharm.D.  Medication Therapy Management Pharmacist   [facility]    Telemedicine Visit Details  Type of service:  Telephone visit  Start Time: 2:00 PM  End Time: 2:40  PM     Medication Therapy Recommendations  No medication therapy recommendations to display

## 2024-08-20 NOTE — PATIENT INSTRUCTIONS
"Recommendations from today's MTM visit:                                                    MTM (medication therapy management) is a service provided by a clinical pharmacist designed to help you get the most of out of your medicines.      Continue current regimen as prescribed.     Follow-up: [facility] 4/15/25; MTM 1/6/25    It was great speaking with you today.  I value your experience and would be very thankful for your time in providing feedback in our clinic survey. In the next few days, you may receive an email or text message from [employer] with a link to a survey related to your  clinical pharmacist.\"     To schedule another MTM appointment, please call the clinic directly or you may call the MTM scheduling line at [phone].    My Clinical Pharmacist's contact information:                                                      Please feel free to contact me with any questions or concerns you have.      Paola Robledo, Pharm.D.  Medication Therapy Management Pharmacist   [facility]  MTM Scheduling Line: [phone]    "

## 2024-09-26 DIAGNOSIS — L63.9 AA (ALOPECIA AREATA): ICD-10-CM

## 2024-11-24 ENCOUNTER — HEALTH MAINTENANCE LETTER (OUTPATIENT)
Age: 64
End: 2024-11-24

## 2025-01-08 ENCOUNTER — VIRTUAL VISIT (OUTPATIENT)
Dept: PHARMACY | Facility: CLINIC | Age: 65
End: 2025-01-08
Attending: PHYSICIAN ASSISTANT
Payer: COMMERCIAL

## 2025-01-08 DIAGNOSIS — M19.90 INFLAMMATORY ARTHRITIS: Primary | ICD-10-CM

## 2025-01-08 DIAGNOSIS — L30.9 DERMATITIS: ICD-10-CM

## 2025-01-08 DIAGNOSIS — J45.909 ASTHMA: ICD-10-CM

## 2025-01-08 DIAGNOSIS — L63.9 AA (ALOPECIA AREATA): ICD-10-CM

## 2025-01-08 DIAGNOSIS — E78.5 HLD (HYPERLIPIDEMIA): ICD-10-CM

## 2025-01-08 NOTE — PROGRESS NOTES
Medication Therapy Management (MTM) Encounter    ASSESSMENT:                            Medication Adherence/Access: No issues identified.    Inflammatory arthritis, Dermatitis, Alopecia areata: Stable, controlled. Would benefit from looking into coverage options to avoid loss of insurance each end of year. Reviewed with pharmacy liaison team and found that Xeljanz may offer the patient a debit program if she calls them to help coupon card funds count towards her deductible. Last lipid panel 4/12/24; could consider repeat and April 2025 appointment with Lisa Nathan PA-C.    Asthma: Stable, controlled.      Hyperlipidemia: Stable, controlled.     PLAN:                            It does appear your insurance is one that does not allow coupon card funds to count towards your deductible. I recommend you call the Xeljanz coupon-card phone number and see if they can switch you to a debit-card program instead, since (if available to you) it would help those costs count toward your deductible in your accumulator insurance plan (and therefore by the time you run out of funds, hopefully your insurance offers improved coverage post-deductible). It uses the same funds as the coupon card uses but is billed as a payment, not a coupon. Please let me know if you have questions about this.  Consider receiving the following vaccination(s): COVID-19 booster, shingles (Shingrix; 2 dose-series), Hepatitis B series (based on labs from 6/29/17), and RSV.    Follow-up: [facility] 4/15/25; MTM 7/24/25    SUBJECTIVE/OBJECTIVE:                          Amanda Lockhart is a 64 year old female called for a follow-up visit.      Reason for visit: Xeljanz follow-up.    Allergies/ADRs: Reviewed in chart.  Past Medical History: Reviewed in chart.  Tobacco: She reports that she has never smoked. She has never used smokeless tobacco.  Alcohol: Reviewed in chart.    Medication Adherence/Access: no issues reported.    Inflammatory arthritis,  Dermatitis, Alopecia areata:   - Xeljanz (tofacitinib) 5 mg twice daily.  - Ketoconazole 2% shampoo for alopecia every day (not as needed; not three times per week).  - Hydrocodone-acetaminophen 5-325 mg as needed.  - Ibuprofen 200 mg x 2 tablets three times daily as needed.      01/08/25: MTM met with patient for follow-up on Xeljanz 5 mg twice daily for alopecia areata. Patient reports effect of current regimen (previous symptoms resolved/controlled) and denies adverse effects. Patient is not having troubles with administration and has a PDC of 1.00. Patient's questions/concerns at this time: does want to assure 2025 coverage of Xeljanz. Patient will continue regimen at this time. Due for their next follow-up with MTM in around 6 months (7/24/25). Patient notes she hasn't done anything about the lump she previously mentioned behind her knee due to caring for her  and daughter post surgeries (and 2 year old grandchild); still plans to.     08/20/24: Patient reports effect of current regimen and denies potential adverse effects including headaches. Patient reports symptoms are greatly improved when on Xeljanz. 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 Did get COVID-19 (terrible headaches; did receive Paxlovid) in July and a bad cold early in the year (required prednisone). Noticed a new lump behind right knee (and some swelling on left leg); not associated with pain unless with pressure; open to talking about it with primary care provider. 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 Pain used to cause a limp, but since starting Xeljanz, limp is gone. Open to PTR program, but deductible is large (~$5000); patient going to investigate where she is at with this..       04/29/24: Patient notes she has a 4-5 month access gap with Xeljanz each year. She has health insurance but the deductible is too high so she goes through a coupon card, but it runs out each year. Notes that prior to Xeljanz she lost all of her hair; she has also had arthritis since she was 8 and goes through periods of bad flares. Notes that the Xeljanz has been life changing for her arthritis pain. Additionally patient notes she has been able to lose weight and can now walk without a limp. Notes needing the Norco about once yearly for intolerable pain which only happens when she is not on her Xeljanz (one bottle given per year and it is enough for her). Ibuprofen she tries not to take for more than 2 days at a time to reduce exposure to NSAIDs. Was sick for a couple of months this winter, which felt unusual, but she hasn't taken Xeljanz since last fall. Patient would like a repeat quant TB test to be completed. The ketoconazole shampoo has been helpful to reduce hair loss. She used the hydrocortisone 2.5% on eyelid dermatitis and it resolved; no longer using.      Specialist: Lisa Nathan PA-C , Dermatology. Last visit on 4/12/24. The following was recommended:   # Alopecia areata, currently managed on Tofacitinib  - The patient denies any major infections, cardiac events, thrombosis, or GI perforations since the last visit. We discussed risks and benefits of continuing Tofacitinib and the patient elected to continue. We discussed the option of ILK and the patient elected to proceed,   - Will obtain CBC, CMP, and lipids today. Standing CBC and CMP every 3 months placed.   - Patient notified she will be receiving an MTM consult.  # Eyelid dermatitis  - Hydrocortisone 2.5% ointment twice daily as needed for flares, up to 5 days. We discussed potential side effects of topical steroids including skin atrophy.   - Vaseline daily.      Pre-Biologic Screenings      Hep B Surface Antibody Non-reactive (6/29/17)     Hep B Core Antibody  No record of completion    Hep B Surface Antigen Non-reactive (6/29/17)    Hep C Antibody  Non-reactive (6/29/17)    HIV Antigen Antibody Non-reactive (6/29/17)    Quantiferon TB Gold Negative (5/16/19)    CBC 4/12/24   CMP 4/12/24 (eGFR 77 mL/min)      Immunization History   Covid-19 vaccine  Due to receive   Influenza (annual) Up-to-date, avoid live FluMist   Tetanus/Tdap Up-to-date   Pneumococcal  Prevnar-20: 8/28/32 Complete   Shingrix (for ages 18-49 at increased risk AND >= 50 years old)  Due to receive second dose   Hepatitis B vaccine Due to receive based on last serologies; declines risk factors   RSV (for ages 60-74 at increased risk AND >= 75 years old)  Due to receive   All patients on biologics should avoid live vaccines (varicella/VZV, intranasal influenza, MMR, or yellow fever vaccine (if traveling))      Asthma:   - Albuterol inhaler or nebulizer as needed.  - Wixela (fluticasone-salmeterol) 250-50 mcg/dose inhaler used twice daily.  - Singulare (montelukast) 10 mg once daily at bedtime.     01/08/25: No reported issues at this time. Albuterol used last week for cold weather triggers (once daily when she gets home) but otherwise doesn't need. Benzonatate pearls are helpful for her cough when needed.     4/29/24: Patient notes since recovering from a cough/sickness this winter, she has not needed her albuterol (in either form) and has had symptoms control with her generic Advair and Singular.      Hyperlipidemia:   - Simvastatin 80 mg at bedtime.      Patient reports no current medication side effects, including unexplained muscle pain. Last lipid labwork was completed 4/12/24.    Today's Vitals: There were no vitals taken for this visit.  ----------------  I spent 20 minutes with this patient today. All changes were made via collaborative practice agreement with Lisa Nathan PA-C. A copy of the visit note was provided to the patient's provider(s).    A summary of these  recommendations was sent via clinic portal.    Paola Robledo, Pharm.D.  Medication Therapy Management Pharmacist   [facility]    Telemedicine Visit Details  The patient's medications can be safely assessed via a telemedicine encounter.  Type of service:  Telephone visit  Originating Location (pt. Location): Home    Distant Location (provider location):  Off-site  Start Time: 11:40 AM - answered on second attempt  End Time: 12:00 PM     Medication Therapy Recommendations  No medication therapy recommendations to display

## 2025-01-08 NOTE — Clinical Note
MELBA -- I checked in with Amanda. She is still doing well but I am recommended a small tweak on how she gets her Xeljanz covered to hopefully reduce risk of coverage issues later in the year. Thanks! --Lo

## 2025-01-15 NOTE — PATIENT INSTRUCTIONS
"Recommendations from today's MTM visit:                                                    MTM (medication therapy management) is a service provided by a clinical pharmacist designed to help you get the most of out of your medicines.      It does appear your insurance is one that does not allow coupon card funds to count towards your deductible. I recommend you call the Xeljanz coupon-card phone number and see if they can switch you to a debit-card program instead, since (if available to you) it would help those costs count toward your deductible in your accumulator insurance plan (and therefore by the time you run out of funds, hopefully your insurance offers improved coverage post-deductible). It uses the same funds as the coupon card uses but is billed as a payment, not a coupon. Please let me know if you have questions about this.  Consider receiving the following vaccination(s): COVID-19 booster, shingles (Shingrix; 2 dose-series), Hepatitis B series (based on labs from 6/29/17), and RSV.    Follow-up: [facility] 4/15/25; MTM 7/24/25    It was great speaking with you today.  I value your experience and would be very thankful for your time in providing feedback in our clinic survey. In the next few days, you may receive an email or text message from [employer] with a link to a survey related to your  clinical pharmacist.\"     To schedule another MTM appointment, please call the clinic directly or you may call the MTM scheduling line at [phone].    My Clinical Pharmacist's contact information:                                                      Please feel free to contact me with any questions or concerns you have.      Paola Robledo, Pharm.D.  Medication Therapy Management Pharmacist   [facility]  MTM Scheduling Line: [phone]    "

## 2025-04-15 ENCOUNTER — LAB (OUTPATIENT)
Dept: LAB | Facility: CLINIC | Age: 65
End: 2025-04-15
Payer: COMMERCIAL

## 2025-04-15 ENCOUNTER — OFFICE VISIT (OUTPATIENT)
Dept: DERMATOLOGY | Facility: CLINIC | Age: 65
End: 2025-04-15
Payer: COMMERCIAL

## 2025-04-15 DIAGNOSIS — L30.9 DERMATITIS: ICD-10-CM

## 2025-04-15 DIAGNOSIS — L63.9 AA (ALOPECIA AREATA): Primary | ICD-10-CM

## 2025-04-15 DIAGNOSIS — L63.9 AA (ALOPECIA AREATA): ICD-10-CM

## 2025-04-15 LAB
ALBUMIN SERPL BCG-MCNC: 4.3 G/DL (ref 3.5–5.2)
ALP SERPL-CCNC: 95 U/L (ref 40–150)
ALT SERPL W P-5'-P-CCNC: 23 U/L (ref 0–50)
ANION GAP SERPL CALCULATED.3IONS-SCNC: 11 MMOL/L (ref 7–15)
AST SERPL W P-5'-P-CCNC: 25 U/L (ref 0–45)
BILIRUB SERPL-MCNC: 0.9 MG/DL
BUN SERPL-MCNC: 20.2 MG/DL (ref 8–23)
CALCIUM SERPL-MCNC: 9.5 MG/DL (ref 8.8–10.4)
CHLORIDE SERPL-SCNC: 107 MMOL/L (ref 98–107)
CHOLEST SERPL-MCNC: 162 MG/DL
CREAT SERPL-MCNC: 0.83 MG/DL (ref 0.51–0.95)
EGFRCR SERPLBLD CKD-EPI 2021: 78 ML/MIN/1.73M2
ERYTHROCYTE [DISTWIDTH] IN BLOOD BY AUTOMATED COUNT: 12.9 % (ref 10–15)
FASTING STATUS PATIENT QL REPORTED: YES
FASTING STATUS PATIENT QL REPORTED: YES
GLUCOSE SERPL-MCNC: 102 MG/DL (ref 70–99)
HCO3 SERPL-SCNC: 24 MMOL/L (ref 22–29)
HCT VFR BLD AUTO: 39.3 % (ref 35–47)
HDLC SERPL-MCNC: 78 MG/DL
HGB BLD-MCNC: 13 G/DL (ref 11.7–15.7)
LDLC SERPL CALC-MCNC: 73 MG/DL
MCH RBC QN AUTO: 30.7 PG (ref 26.5–33)
MCHC RBC AUTO-ENTMCNC: 33.1 G/DL (ref 31.5–36.5)
MCV RBC AUTO: 93 FL (ref 78–100)
NONHDLC SERPL-MCNC: 84 MG/DL
PLATELET # BLD AUTO: 292 10E3/UL (ref 150–450)
POTASSIUM SERPL-SCNC: 4.2 MMOL/L (ref 3.4–5.3)
PROT SERPL-MCNC: 7 G/DL (ref 6.4–8.3)
RBC # BLD AUTO: 4.24 10E6/UL (ref 3.8–5.2)
SODIUM SERPL-SCNC: 142 MMOL/L (ref 135–145)
TRIGL SERPL-MCNC: 55 MG/DL
WBC # BLD AUTO: 7.5 10E3/UL (ref 4–11)

## 2025-04-15 PROCEDURE — 86481 TB AG RESPONSE T-CELL SUSP: CPT | Performed by: PHYSICIAN ASSISTANT

## 2025-04-15 PROCEDURE — 36415 COLL VENOUS BLD VENIPUNCTURE: CPT | Performed by: PATHOLOGY

## 2025-04-15 PROCEDURE — 99000 SPECIMEN HANDLING OFFICE-LAB: CPT | Performed by: PATHOLOGY

## 2025-04-15 PROCEDURE — 80053 COMPREHEN METABOLIC PANEL: CPT | Performed by: PATHOLOGY

## 2025-04-15 PROCEDURE — 80061 LIPID PANEL: CPT | Performed by: PATHOLOGY

## 2025-04-15 PROCEDURE — 85027 COMPLETE CBC AUTOMATED: CPT | Performed by: PATHOLOGY

## 2025-04-15 RX ORDER — CLOBETASOL PROPIONATE 0.5 MG/G
OINTMENT TOPICAL
Qty: 45 G | Refills: 1 | Status: SHIPPED | OUTPATIENT
Start: 2025-04-15

## 2025-04-15 ASSESSMENT — PAIN SCALES - GENERAL: PAINLEVEL_OUTOF10: NO PAIN (0)

## 2025-04-15 NOTE — PATIENT INSTRUCTIONS
Patient Education        Proper skin care from [facility]:     -Eliminate harsh soaps as they strip the natural oils from the skin, often resulting in dry itchy skin ( i.e. Dial, Zest, Frisian Spring)  -Use mild soaps such as Cetaphil or Dove Sensitive Skin in the shower. You do not need to use soap on arms, legs, and trunk every time you shower unless visibly soiled.   -Avoid hot or cold showers.  -After showering, lightly dry off and apply moisturizing within 2-3 minutes. This will help trap moisture in the skin.   -Aggressive use of a moisturizer at least 1-2 times a day to the entire body (including -Vanicream, Cetaphil, Aquaphor or Cerave) and moisturize hands after every washing.  -We recommend using moisturizers that come in a tub that needs to be scooped out, not a pump. This has more of an oil base. It will hold moisture in your skin much better than a water base moisturizer. The above recommended are non-pore clogging.        Wear a sunscreen with at least SPF 30 on your face, ears, neck and V of the chest daily. Wear sunscreen on other areas of the body if those areas are exposed to the sun throughout the day. Sunscreens can contain physical and/or chemical blockers. Physical blockers are less likely to clog pores, these include zinc oxide and titanium dioxide. Reapply every two hour and after swimming.      Sunscreen examples: https://www.ewg.org/sunscreen/     UV radiation  UVA radiation remains constant throughout the day and throughout the year. It is a longer wavelength than UVB and therefore penetrates deeper into the skin leading to immediate and delayed tanning, photoaging, and skin cancer. 70-80% of UVA and UVB radiation occurs between the hours of 10am-2pm.  UVB radiation  UVB radiation causes the most harmful effects and is more significant during the summer months. However, snow and ice can reflect UVB radiation leading to skin damage during the winter months as well. UVB radiation is  responsible for tanning, burning, inflammation, delayed erythema (pinkness), pigmentation (brown spots), and skin cancer.      I recommend self monthly full body exams and yearly full body exams with a dermatology provider. If you develop a new or changing lesion please follow up for examination. Most skin cancers are pink and scaly or pink and pearly. However, we do see blue/brown/black skin cancers.  Consider the ABCDEs of melanoma when giving yourself your monthly full body exam ( don't forget the groin, buttocks, feet, toes, etc). A-asymmetry, B-borders, C-color, D-diameter, E-elevation or evolving. If you see any of these changes please follow up in clinic. If you cannot see your back I recommend purchasing a hand held mirror to use with a larger wall mirror.       Checking for Skin Cancer  You can find cancer early by checking your skin each month. There are 3 kinds of skin cancer. They are melanoma, basal cell carcinoma, and squamous cell carcinoma. Doing monthly skin checks is the best way to find new marks or skin changes. Follow the instructions below for checking your skin.   The ABCDEs of checking moles for melanoma   Check your moles or growths for signs of melanoma using ABCDE:   Asymmetry: the sides of the mole or growth don t match  Border: the edges are ragged, notched, or blurred  Color: the color within the mole or growth varies  Diameter: the mole or growth is larger than 6 mm (size of a pencil eraser)  Evolving: the size, shape, or color of the mole or growth is changing (evolving is not shown in the images below)    Checking for other types of skin cancer  Basal cell carcinoma or squamous cell carcinoma have symptoms such as:      A spot or mole that looks different from all other marks on your skin  Changes in how an area feels, such as itching, tenderness, or pain  Changes in the skin's surface, such as oozing, bleeding, or scaliness  A sore that does not heal  New swelling or redness beyond  the border of a mole     Who s at risk?  Anyone can get skin cancer. But you are at greater risk if you have:   Fair skin, light-colored hair, or light-colored eyes  Many moles or abnormal moles on your skin  A history of sunburns from sunlight or tanning beds  A family history of skin cancer  A history of exposure to radiation or chemicals  A weakened immune system  If you have had skin cancer in the past, you are at risk for recurring skin cancer.   How to check your skin  Do your monthly skin checkups in front of a full-length mirror. Check all parts of your body, including your:   Head (ears, face, neck, and scalp)  Torso (front, back, and sides)  Arms (tops, undersides, upper, and lower armpits)  Hands (palms, backs, and fingers, including under the nails)  Buttocks and genitals  Legs (front, back, and sides)  Feet (tops, soles, toes, including under the nails, and between toes)  If you have a lot of moles, take digital photos of them each month. Make sure to take photos both up close and from a distance. These can help you see if any moles change over time.   Most skin changes are not cancer. But if you see any changes in your skin, call your doctor right away. Only he or she can diagnose a problem. If you have skin cancer, seeing your doctor can be the first step toward getting the treatment that could save your life.   [employer] last reviewed this educational content on 4/1/2019 2000-2020 [employer]. [address] [place] [address], [place] [address]. All rights reserved. This information is not intended as a substitute for professional medical care. Always follow your healthcare professional's instructions.        When should I call my doctor?  If you are worsening or not improving, please, contact us or seek urgent care as noted below.      Who should I call with questions (adults)?  [facility] (adult and pediatric): [phone]  [facility]  [place] (adult): [phone]  [facility] ([place], [place], [place] and [place]) [phone]  For urgent needs outside of business hours call the [facility] at [phone] and ask for the dermatology resident on call to be paged  If this is a medical emergency and you are unable to reach an ER, Call 911        If you need a prescription refill, please contact your pharmacy. Refills are approved or denied by our Physicians during normal business hours, Monday through Fridays  Per office policy, refills will not be granted if you have not been seen within the past year (or sooner depending on your child's condition)

## 2025-04-15 NOTE — PROGRESS NOTES
[facility] Dermatology Note  Encounter Date: Apr 15, 2025  Office Visit     Reviewed patients past medical history and pertinent chart review prior to patients visit today.     Dermatology Problem List:  1.  Alopecia areata, rapidly progressive, onset 10/2010 with scalp folliculitis. Xeljanz w/ breaks in treatment since 5/2017.   - Current: oral tofacitinib 5mg BID, ketoconazole shampoo TIW, ILK injections (last 4/12/2024)  2.  ROSARIO/JRA, improved with above. Follows with Dr. Black with Rheumatology; managed with tofacitinib monotherapy at this point.   3.  Mild dermatitis, ears- TAC cream, eyelids - hydrocortisone  4. Onychodystrophy, right great toenail, s/p partial avulsion Nov 2019. PAS nail clipping 11/21/19 negative  5. Acute paronychia, right great toe, s/p keflex    ____________________________________________    Assessment & Plan:     # Alopecia areata, currently managed on Tofacitinib  - The patient denies any major infections, cardiac events, thrombosis, or GI perforations since the last visit. We discussed risks and benefits of continuing Tofacitinib and the patient elected to continue.   - Will obtain CBC, CMP, and lipids today. Standing CBC and CMP every 3 months placed.     # Hand dermatitis vs early psoriasis  - Most consistent with hand dermatitis, although we reviewed possibility of early psoriasis. Will plan to start clobetasol, consider biopsy if the rash persists or spreads.   - Start clobetasol 0.05% ointment twice daily until rash clears, up to 4 weeks. Restart if rash flares again in the future. We reviewed potential side effects, including skin atrophy.  - Wet dressings: Once daily apply a thick layer of medication/ moisturizer to affected areas. Wet cotton mittens with warm water and wring out excess water. Apply the wet cloth to the skin over the medication/ moisturizer for 20- 60 minutes. Remove wet cloth and rub in excess medication/ moisturizer.     Follow up 12  months with Dr. Siddiqi.     All risks, benefits and alternatives were discussed with patient.  Patient is in agreement and understands the assessment and plan.  All questions were answered.  Lisa Nathan PA-C  [facility]  _______________________________________    CC: Hair Loss (AA - Amanda states she has been stable./She is also concerned about rough patches on her hands and elbows.)    HPI:  Ms. Eliane Lockhart is a(n) 65 year old female who presents today as a return patient for hair loss. She reports her hair has been very stable. No scalp itching, scale, or pain. She notes having an asthma exacerbation and norovirus infection since her last visit, no other infection. No cardiac events, thrombosis, or GI perforations.     Second, she notes a rash on the right hand and elbows that has been ongoing for months. This improves with hydrocortisone, but does not resolve. No new medications, illnesses, or new products preceding the rash. She notes a history of dermatitis and her daughter has eczema.     Patient is otherwise feeling well, without additional skin concerns.    Physical Exam:  SKIN: Focused examination of scalp, eyebrows, eyelashes, elbows and hands was performed.  - hair pull test negative. No erythema, scale, or scarring involving the scalp. Eyebrows and eyelashes even in density.   - The right 1st finger, thenar eminence, right 2nd dorsal PIP, and bilateral elbows demonstrate pink patches with adherent scale.     - No other lesions of concern on areas examined.     Medications:  Current Outpatient Medications   Medication Sig Dispense Refill    albuterol (PROAIR HFA/PROVENTIL HFA/VENTOLIN HFA) 108 (90 Base) MCG/ACT inhaler       albuterol (PROVENTIL) (2.5 MG/3ML) 0.083% neb solution Take 2.5 mg by nebulization every 6 hours as needed for shortness of breath, wheezing or cough      fluticasone-salmeterol (ADVAIR) 250-50 MCG/ACT inhaler Inhale 1 puff into the lungs every 12 hours       HYDROcodone-acetaminophen (NORCO) 5-325 MG per tablet       ibuprofen (ADVIL/MOTRIN) 200 MG tablet Take 400 mg by mouth 3 times daily as needed for pain      ketoconazole (NIZORAL) 2 % external shampoo Apply Three times weekly: lather into scalp, leave in place for 3-5 minutes, then rinse. 120 mL 11    montelukast (SINGULAIR) 10 MG tablet Take 10 mg by mouth daily      simvastatin (ZOCOR) 80 MG tablet Take 80 mg by mouth daily      tofacitinib (XELJANZ) 5 MG tablet Take 1 tablet (5 mg) by mouth 2 times daily. 60 tablet 3     Current Facility-Administered Medications   Medication Dose Route Frequency Provider Last Rate Last Admin    triamcinolone acetonide (KENALOG-10) injection 15 mg  15 mg Intra-Lesional Once Stella Batres MD          Past Medical History:   Patient Active Problem List   Diagnosis    AA (alopecia areata)    Inflammatory arthritis    Dermatitis     No past medical history on file.    CC Referred Self, MD  No address on file on close of this encounter.

## 2025-04-15 NOTE — NURSING NOTE
Dermatology Rooming Note    Eliane Lockhart's goals for this visit include:   Chief Complaint   Patient presents with    Hair Loss     AA - Amanda states she has been stable.  She is also concerned about rough patches on her hands and elbows.     Nya DEE CMA

## 2025-04-15 NOTE — LETTER
4/15/2025       RE: Eliane Lockhart  [address] [place]  [facility] [address]     Dear Colleague,    Thank you for referring your patient, Eliane Lockhart, to the [facility] [place] [facility]. Please see a copy of my visit note below.    [facility] Dermatology Note  Encounter Date: Apr 15, 2025  Office Visit     Reviewed patients past medical history and pertinent chart review prior to patients visit today.     Dermatology Problem List:  1.  Alopecia areata, rapidly progressive, onset 10/2010 with scalp folliculitis. Xeljanz w/ breaks in treatment since 5/2017.   - Current: oral tofacitinib 5mg BID, ketoconazole shampoo TIW, ILK injections (last 4/12/2024)  2.  ROSARIO/JRA, improved with above. Follows with Dr. Black with Rheumatology; managed with tofacitinib monotherapy at this point.   3.  Mild dermatitis, ears- TAC cream, eyelids - hydrocortisone  4. Onychodystrophy, right great toenail, s/p partial avulsion Nov 2019. PAS nail clipping 11/21/19 negative  5. Acute paronychia, right great toe, s/p keflex    ____________________________________________    Assessment & Plan:     # Alopecia areata, currently managed on Tofacitinib  - The patient denies any major infections, cardiac events, thrombosis, or GI perforations since the last visit. We discussed risks and benefits of continuing Tofacitinib and the patient elected to continue.   - Will obtain CBC, CMP, and lipids today. Standing CBC and CMP every 3 months placed.     # Hand dermatitis vs early psoriasis  - Most consistent with hand dermatitis, although we reviewed possibility of early psoriasis. Will plan to start clobetasol, consider biopsy if the rash persists or spreads.   - Start clobetasol 0.05% ointment twice daily until rash clears, up to 4 weeks. Restart if rash flares again in the future. We reviewed potential side effects, including skin  atrophy.    Follow up 12 months with Dr. Siddiqi.     All risks, benefits and alternatives were discussed with patient.  Patient is in agreement and understands the assessment and plan.  All questions were answered.  Lisa Nathan PA-C  [facility]  _______________________________________    CC: Hair Loss (AA - Amanda states she has been stable./She is also concerned about rough patches on her hands and elbows.)    HPI:  Ms. Eliane Lockhart is a(n) 65 year old female who presents today as a return patient for hair loss. She reports her hair has been very stable. No scalp itching, scale, or pain. She notes having an asthma exacerbation and norovirus infection since her last visit, no other infection. No cardiac events, thrombosis, or GI perforations.     Second, she notes a rash on the right hand and elbows that has been ongoing for months. This improves with hydrocortisone, but does not resolve. No new medications, illnesses, or new products preceding the rash. She notes a history of dermatitis and her daughter has eczema.     Patient is otherwise feeling well, without additional skin concerns.    Physical Exam:  SKIN: Focused examination of scalp, eyebrows, eyelashes, elbows and hands was performed.  - hair pull test negative. No erythema, scale, or scarring involving the scalp. Eyebrows and eyelashes even in density.   - The right 1st finger, thenar eminence, right 2nd dorsal PIP, and bilateral elbows demonstrate pink patches with adherent scale.     - No other lesions of concern on areas examined.     Medications:  Current Outpatient Medications   Medication Sig Dispense Refill     albuterol (PROAIR HFA/PROVENTIL HFA/VENTOLIN HFA) 108 (90 Base) MCG/ACT inhaler        albuterol (PROVENTIL) (2.5 MG/3ML) 0.083% neb solution Take 2.5 mg by nebulization every 6 hours as needed for shortness of breath, wheezing or cough       fluticasone-salmeterol (ADVAIR) 250-50 MCG/ACT inhaler Inhale 1 puff  into the lungs every 12 hours       HYDROcodone-acetaminophen (NORCO) 5-325 MG per tablet        ibuprofen (ADVIL/MOTRIN) 200 MG tablet Take 400 mg by mouth 3 times daily as needed for pain       ketoconazole (NIZORAL) 2 % external shampoo Apply Three times weekly: lather into scalp, leave in place for 3-5 minutes, then rinse. 120 mL 11     montelukast (SINGULAIR) 10 MG tablet Take 10 mg by mouth daily       simvastatin (ZOCOR) 80 MG tablet Take 80 mg by mouth daily       tofacitinib (XELJANZ) 5 MG tablet Take 1 tablet (5 mg) by mouth 2 times daily. 60 tablet 3     Current Facility-Administered Medications   Medication Dose Route Frequency Provider Last Rate Last Admin     triamcinolone acetonide (KENALOG-10) injection 15 mg  15 mg Intra-Lesional Once Stella Batres MD          Past Medical History:   Patient Active Problem List   Diagnosis     AA (alopecia areata)     Inflammatory arthritis     Dermatitis     No past medical history on file.    CC Referred Self, MD  No address on file on close of this encounter.       Again, thank you for allowing me to participate in the care of your patient.      Sincerely,    Lisa Nathan PA-C

## 2025-04-16 LAB
QUANTIFERON MITOGEN: 8.55 IU/ML
QUANTIFERON NIL TUBE: 0.04 IU/ML
QUANTIFERON TB1 TUBE: 0.05 IU/ML
QUANTIFERON TB2 TUBE: 0.04

## 2025-04-17 LAB
GAMMA INTERFERON BACKGROUND BLD IA-ACNC: 0.04 IU/ML
M TB IFN-G BLD-IMP: NEGATIVE
M TB IFN-G CD4+ BCKGRND COR BLD-ACNC: 8.51 IU/ML
MITOGEN IGNF BCKGRD COR BLD-ACNC: 0 IU/ML
MITOGEN IGNF BCKGRD COR BLD-ACNC: 0.01 IU/ML

## 2025-05-07 ENCOUNTER — TELEPHONE (OUTPATIENT)
Dept: DERMATOLOGY | Facility: CLINIC | Age: 65
End: 2025-05-07
Payer: COMMERCIAL

## 2025-05-07 NOTE — TELEPHONE ENCOUNTER
Left Voicemail (1st Attempt) and Sent Mychart (1st Attempt) for the patient to call back and schedule the following:    Appointment type: Return dermatology  Provider: Lisa Nathan PA-C  Return date: Approx. 4/15/26  Specialty phone number: [phone]    Additional Notes: